# Patient Record
Sex: FEMALE | Race: WHITE | Employment: OTHER | URBAN - METROPOLITAN AREA
[De-identification: names, ages, dates, MRNs, and addresses within clinical notes are randomized per-mention and may not be internally consistent; named-entity substitution may affect disease eponyms.]

---

## 2017-01-23 ENCOUNTER — OFFICE VISIT (OUTPATIENT)
Dept: FAMILY MEDICINE CLINIC | Age: 62
End: 2017-01-23

## 2017-01-23 VITALS
OXYGEN SATURATION: 98 % | RESPIRATION RATE: 20 BRPM | DIASTOLIC BLOOD PRESSURE: 76 MMHG | BODY MASS INDEX: 31.25 KG/M2 | WEIGHT: 169.8 LBS | HEIGHT: 62 IN | HEART RATE: 76 BPM | SYSTOLIC BLOOD PRESSURE: 122 MMHG

## 2017-01-23 DIAGNOSIS — S29.019A THORACIC MYOFASCIAL STRAIN, INITIAL ENCOUNTER: Primary | ICD-10-CM

## 2017-01-23 DIAGNOSIS — M25.532 LEFT WRIST PAIN: ICD-10-CM

## 2017-01-23 PROCEDURE — 99213 OFFICE O/P EST LOW 20 MIN: CPT | Performed by: NURSE PRACTITIONER

## 2017-01-23 RX ORDER — BACLOFEN 20 MG/1
20 TABLET ORAL EVERY 8 HOURS PRN
Qty: 30 TABLET | Refills: 0 | Status: SHIPPED | OUTPATIENT
Start: 2017-01-23 | End: 2017-10-26 | Stop reason: SDUPTHER

## 2017-01-23 RX ORDER — NAPROXEN 500 MG/1
500 TABLET ORAL 2 TIMES DAILY WITH MEALS
Qty: 30 TABLET | Refills: 0 | Status: SHIPPED | OUTPATIENT
Start: 2017-01-23 | End: 2018-10-22 | Stop reason: ALTCHOICE

## 2017-01-24 ASSESSMENT — ENCOUNTER SYMPTOMS
COUGH: 0
BACK PAIN: 1
SHORTNESS OF BREATH: 0
ABDOMINAL PAIN: 0
NAUSEA: 0

## 2017-05-09 DIAGNOSIS — G25.81 RLS (RESTLESS LEGS SYNDROME): ICD-10-CM

## 2017-05-09 RX ORDER — ROPINIROLE 3 MG/1
3 TABLET, FILM COATED ORAL NIGHTLY
Qty: 30 TABLET | Refills: 11 | Status: SHIPPED | OUTPATIENT
Start: 2017-05-09 | End: 2017-10-05 | Stop reason: SDUPTHER

## 2017-06-06 ENCOUNTER — TELEPHONE (OUTPATIENT)
Dept: CARDIOLOGY | Age: 62
End: 2017-06-06

## 2017-06-06 ENCOUNTER — OFFICE VISIT (OUTPATIENT)
Dept: CARDIOLOGY | Age: 62
End: 2017-06-06

## 2017-06-06 VITALS
BODY MASS INDEX: 29.95 KG/M2 | HEART RATE: 64 BPM | SYSTOLIC BLOOD PRESSURE: 130 MMHG | DIASTOLIC BLOOD PRESSURE: 72 MMHG | HEIGHT: 63 IN | WEIGHT: 169 LBS

## 2017-06-06 DIAGNOSIS — I25.10 CORONARY ARTERY DISEASE INVOLVING NATIVE CORONARY ARTERY OF NATIVE HEART WITHOUT ANGINA PECTORIS: Primary | ICD-10-CM

## 2017-06-06 DIAGNOSIS — Z95.820 S/P ANGIOPLASTY WITH STENT: ICD-10-CM

## 2017-06-06 DIAGNOSIS — I42.9 CARDIOMYOPATHY (HCC): ICD-10-CM

## 2017-06-06 DIAGNOSIS — Z72.0 TOBACCO ABUSE: ICD-10-CM

## 2017-06-06 DIAGNOSIS — E78.00 PURE HYPERCHOLESTEROLEMIA: ICD-10-CM

## 2017-06-06 PROCEDURE — 99213 OFFICE O/P EST LOW 20 MIN: CPT | Performed by: INTERNAL MEDICINE

## 2017-06-14 DIAGNOSIS — Z95.5 H/O HEART ARTERY STENT: ICD-10-CM

## 2017-06-14 DIAGNOSIS — I25.10 CORONARY ARTERY DISEASE INVOLVING NATIVE CORONARY ARTERY OF NATIVE HEART WITHOUT ANGINA PECTORIS: ICD-10-CM

## 2017-06-14 RX ORDER — ATENOLOL 50 MG/1
75 TABLET ORAL DAILY
Qty: 135 TABLET | Refills: 3 | Status: SHIPPED | OUTPATIENT
Start: 2017-06-14 | End: 2018-09-11 | Stop reason: SDUPTHER

## 2017-10-05 ENCOUNTER — OFFICE VISIT (OUTPATIENT)
Dept: FAMILY MEDICINE CLINIC | Age: 62
End: 2017-10-05
Payer: OTHER GOVERNMENT

## 2017-10-05 VITALS
TEMPERATURE: 98.5 F | HEART RATE: 51 BPM | WEIGHT: 172.3 LBS | HEIGHT: 63 IN | OXYGEN SATURATION: 98 % | RESPIRATION RATE: 13 BRPM | DIASTOLIC BLOOD PRESSURE: 58 MMHG | SYSTOLIC BLOOD PRESSURE: 116 MMHG | BODY MASS INDEX: 30.53 KG/M2

## 2017-10-05 DIAGNOSIS — Z00.00 WELL ADULT EXAM: Primary | ICD-10-CM

## 2017-10-05 DIAGNOSIS — G57.02 PIRIFORMIS SYNDROME OF LEFT SIDE: ICD-10-CM

## 2017-10-05 DIAGNOSIS — J01.40 ACUTE NON-RECURRENT PANSINUSITIS: ICD-10-CM

## 2017-10-05 DIAGNOSIS — Z95.820 S/P ANGIOPLASTY WITH STENT: ICD-10-CM

## 2017-10-05 DIAGNOSIS — I25.10 CORONARY ARTERY DISEASE INVOLVING NATIVE CORONARY ARTERY OF NATIVE HEART WITHOUT ANGINA PECTORIS: ICD-10-CM

## 2017-10-05 DIAGNOSIS — G25.81 RLS (RESTLESS LEGS SYNDROME): ICD-10-CM

## 2017-10-05 DIAGNOSIS — F33.42 RECURRENT MAJOR DEPRESSIVE DISORDER, IN FULL REMISSION (HCC): ICD-10-CM

## 2017-10-05 PROCEDURE — 90732 PPSV23 VACC 2 YRS+ SUBQ/IM: CPT | Performed by: NURSE PRACTITIONER

## 2017-10-05 PROCEDURE — 99214 OFFICE O/P EST MOD 30 MIN: CPT | Performed by: NURSE PRACTITIONER

## 2017-10-05 PROCEDURE — 90688 IIV4 VACCINE SPLT 0.5 ML IM: CPT | Performed by: NURSE PRACTITIONER

## 2017-10-05 PROCEDURE — 90472 IMMUNIZATION ADMIN EACH ADD: CPT | Performed by: NURSE PRACTITIONER

## 2017-10-05 PROCEDURE — 90736 HZV VACCINE LIVE SUBQ: CPT | Performed by: NURSE PRACTITIONER

## 2017-10-05 PROCEDURE — 90471 IMMUNIZATION ADMIN: CPT | Performed by: NURSE PRACTITIONER

## 2017-10-05 PROCEDURE — 90715 TDAP VACCINE 7 YRS/> IM: CPT | Performed by: NURSE PRACTITIONER

## 2017-10-05 RX ORDER — ROPINIROLE 3 MG/1
3 TABLET, FILM COATED ORAL NIGHTLY
Qty: 90 TABLET | Refills: 3 | Status: CANCELLED | OUTPATIENT
Start: 2017-10-05

## 2017-10-05 RX ORDER — ROPINIROLE 3 MG/1
3 TABLET, FILM COATED ORAL NIGHTLY
Qty: 90 TABLET | Refills: 3 | Status: SHIPPED | OUTPATIENT
Start: 2017-10-05 | End: 2017-10-19 | Stop reason: SDUPTHER

## 2017-10-05 RX ORDER — LOVASTATIN 40 MG/1
80 TABLET ORAL NIGHTLY
Qty: 180 TABLET | Refills: 3 | Status: SHIPPED | OUTPATIENT
Start: 2017-10-05 | End: 2017-10-19 | Stop reason: SDUPTHER

## 2017-10-05 RX ORDER — AMOXICILLIN AND CLAVULANATE POTASSIUM 875; 125 MG/1; MG/1
1 TABLET, FILM COATED ORAL 2 TIMES DAILY WITH MEALS
Qty: 20 TABLET | Refills: 0 | Status: SHIPPED | OUTPATIENT
Start: 2017-10-05 | End: 2017-10-15

## 2017-10-05 ASSESSMENT — PATIENT HEALTH QUESTIONNAIRE - PHQ9
1. LITTLE INTEREST OR PLEASURE IN DOING THINGS: 0
SUM OF ALL RESPONSES TO PHQ9 QUESTIONS 1 & 2: 0
2. FEELING DOWN, DEPRESSED OR HOPELESS: 0
SUM OF ALL RESPONSES TO PHQ QUESTIONS 1-9: 0

## 2017-10-05 ASSESSMENT — ENCOUNTER SYMPTOMS: CHEST TIGHTNESS: 0

## 2017-10-05 NOTE — PROGRESS NOTES
09/30/2016 0940    CREATININE 0.7 09/30/2016 0940        Component Value Date/Time    CALCIUM 9.6 09/30/2016 0940    ALKPHOS 127 (H) 09/30/2016 0940    AST 26 09/30/2016 0940    ALT 30 09/30/2016 0940    BILITOT 0.4 09/30/2016 0940          Lab Results   Component Value Date    TSH 2.880 09/30/2016       Lab Results   Component Value Date    WBC 7.3 09/30/2016    HGB 13.4 09/30/2016    HCT 39.7 09/30/2016    MCV 92.3 09/30/2016     09/30/2016       Health Maintenance   Topic Date Due    Hepatitis C screen  1955    HIV screen  08/15/1969    DTaP/Tdap/Td vaccine (1 - Tdap) 08/15/1973    Pneumococcal med risk (1 of 1 - PPSV23) 08/15/1973    Colon cancer screen colonoscopy  08/15/2004    Cervical cancer screen  10/05/2018    Breast cancer screen  10/07/2018    Lipid screen  09/30/2021    Zostavax vaccine  Completed    Flu vaccine  Completed     Immunization History   Administered Date(s) Administered    Influenza, Quadv, 3 Years and older, IM 10/05/2017    Zoster 10/05/2017         Review of Systems   Respiratory: Negative for chest tightness. Neurological: Negative for dizziness and light-headedness. Psychiatric/Behavioral: Negative. Objective:   Physical Exam   Constitutional: She is oriented to person, place, and time. Vital signs are normal. She appears well-developed and well-nourished. She is active. She does not have a sickly appearance. No distress. HENT:   Right Ear: Tympanic membrane normal.   Left Ear: Tympanic membrane normal.   Nose: Mucosal edema and rhinorrhea present. Mouth/Throat: Oropharynx is clear and moist and mucous membranes are normal.   Cardiovascular: Normal rate, regular rhythm, S1 normal, S2 normal, normal heart sounds and normal pulses. Exam reveals no S3. No murmur heard. Pulmonary/Chest: Effort normal and breath sounds normal. She has no decreased breath sounds. She has no wheezes. She has no rhonchi. Abdominal: Soft.  Bowel sounds are normal.

## 2017-10-05 NOTE — PROGRESS NOTES
adverse reaction to me immediately. Patient tolerated injection well. VIS, ABN given, reviewed and signed per patient. Zostavax is mixed with a sterile diluent  NDC: 1112-6932-46, Lot: [id number], Ex Date: 11/4/19. After obtaining consent, and per orders of Lesa Gusman CNP, injection of Influenza Vaccine 0.5 ML given IM in Right deltoid by Aretha Paredes ([facility]). Patient instructed to report any adverse reaction to me immediately. Patient tolerated injection well. VIS, ABN given, reviewed and signed patient.

## 2017-10-05 NOTE — PATIENT INSTRUCTIONS
You may receive a survey about your visit with us today. The feedback from our patients helps us identify what is working well and where the service to all patients can be enhanced. Thank you! Stopping Smoking: Care Instructions  Your Care Instructions  Cigarette smokers crave the nicotine in cigarettes. Giving it up is much harder than simply changing a habit. Your body has to stop craving the nicotine. It is hard to quit, but you can do it. There are many tools that people use to quit smoking. You may find that combining tools works best for you. There are several steps to quitting. First you get ready to quit. Then you get support to help you. After that, you learn new skills and behaviors to become a nonsmoker. For many people, a necessary step is getting and using medicine. Your doctor will help you set up the plan that best meets your needs. You may want to attend a smoking cessation program to help you quit smoking. When you choose a program, look for one that has proven success. Ask your doctor for ideas. You will greatly increase your chances of success if you take medicine as well as get counseling or join a cessation program.  Some of the changes you feel when you first quit tobacco are uncomfortable. Your body will miss the nicotine at first, and you may feel short-tempered and grumpy. You may have trouble sleeping or concentrating. Medicine can help you deal with these symptoms. You may struggle with changing your smoking habits and rituals. The last step is the tricky one: Be prepared for the smoking urge to continue for a time. This is a lot to deal with, but keep at it. You will feel better. Follow-up care is a key part of your treatment and safety. Be sure to make and go to all appointments, and call your doctor if you are having problems. Its also a good idea to know your test results and keep a list of the medicines you take. How can you care for yourself at home?   · Ask your family, Exercises and a change in how you move and sit may be enough to stop the pressure on the nerve. Follow-up care is a key part of your treatment and safety. Be sure to make and go to all appointments, and call your doctor if you are having problems. It's also a good idea to know your test results and keep a list of the medicines you take. How can you care for yourself at home? · If your doctor thinks that strenuous exercise is causing your problem, stop or cut back on activities such as running. You may find swimming to be a good exercise for a while. · Stretch the piriformis muscle. Josue Salas on your back. ¨ Bend one leg at the knee and keep the other leg flat on the ground. ¨ Raise your bent knee up and then move it across your body. Hold the outside of the knee with the opposite hand. ¨ Gently pull the knee with your hand toward the opposite shoulder. ¨ Hold the stretch for at least 15 to 30 seconds. Switch legs. ¨ Do the stretch several times each day. · Massage the muscle to relieve pressure. ¨ Sit on the floor. Lean to one side so that the hip on your sore side is off the ground. Put a tennis ball under your buttock on that side. ¨ As you put weight onto the tennis ball, you may find spots that are especially sore. Move gently so that the tennis ball gently massages each of the sore spots. · Use ice or heat to help reduce pain. Put ice or a cold pack or a heating pad set on low or a warm cloth on the sore area for 10 to 20 minutes at a time. Put a thin cloth between the ice pack or heating pad and your skin. · Ask your doctor if you can take an over-the-counter pain medicine, such as acetaminophen (Tylenol), ibuprofen (Advil, Motrin), or naproxen (Aleve). Be safe with medicines. Read and follow all instructions on the label. · Have your doctor or a physical therapist watch how you move.  You may need physical therapy or special inserts in your shoes (orthotics) to help you move in a way that does not put pressure on your nerves. When should you call for help? Watch closely for changes in your health, and be sure to contact your doctor if:  · You do not feel better after several weeks of home care. · Your pain gets worse. · Your leg becomes weak or numb. Where can you learn more? Go to [email or web address] [email or web address] org and sign in to your [employer] account. Enter F905 in the [employer] box to learn more about \"Piriformis Syndrome: Care Instructions. \"     If you do not have an account, please click on the \"Sign Up Now\" link. Current as of: March 21, 2017  Content Version: 11.3  © 8806-8685 [employer]. Care instructions adapted under license by [facility] ([facility]). If you have questions about a medical condition or this instruction, always ask your healthcare professional. [place] [address] any warranty or liability for your use of this information. Piriformis Syndrome: Exercises  Your Care Instructions  Here are some examples of typical rehabilitation exercises for your condition. Start each exercise slowly. Ease off the exercise if you start to have pain. Your doctor or physical therapist will tell you when you can start these exercises and which ones will work best for you. How to do the exercises  Hip rotator stretch    1. Lie on your back with both knees bent and your feet flat on the floor. 2. Put the ankle of your affected leg on your opposite thigh near your knee. 3. Use your hand to gently push your knee (on your affected leg) away from your body until you feel a gentle stretch around your hip. 4. Hold the stretch for 15 to 30 seconds. 5. Repeat 2 to 4 times. 6. Switch legs and repeat steps 1 through 5. Piriformis stretch    1. Lie on your back with your legs straight. 2. Lift your affected leg and bend your knee. With your opposite hand, reach across your body, and then gently pull your knee toward your opposite shoulder.   3. Hold the

## 2017-10-05 NOTE — MR AVS SNAPSHOT
After Visit Summary             Jesika Oro   10/5/2017 10:45 AM   Office Visit    Description:  Female : 1955   Provider:  Rebecca Villavicencio NP   Department:  [facility]              Your Follow-Up and Future Appointments         Below is a list of your follow-up and future appointments. This may not be a complete list as you may have made appointments directly with providers that we are not aware of or your providers may have made some for you. Please call your providers to confirm appointments. It is important to keep your appointments. Please bring your current insurance card, photo ID, co-pay, and all medication bottles to your appointment. If self-pay, payment is expected at the time of service. Your To-Do List     Future Appointments Provider Department Dept Phone    2017 10:00 AM Darron Xiong MD [facility] [address] [place] [address] [phone]    Please arrive 15 minutes prior to appointment, bring photo ID and insurance card. Future Orders Complete By Expires    Comprehensive Metabolic Panel [XKX82 Custom]  10/5/2017 10/5/2018    Hemoglobin A1C [LAB90 Custom]  10/5/2017 10/5/2018    Lipid Panel Lin Nicky Custom]  10/5/2017 10/5/2018         Information from Your Visit        Department     Name Address Phone Fax    [facility] [address] [place] [phone]      You Were Seen for:         Comments    Well adult exam   [[id number]]         Vital Signs     Blood Pressure Pulse Temperature Respirations Height Weight    116/58 (Site: Left Arm, Position: Sitting, Cuff Size: Medium Adult) 51 98.5 °F (36.9 °C) (Oral) 13 5' 2.99\" (1.6 m) 172 lb 4.8 oz (78.2 kg)    Last Menstrual Period Oxygen Saturation Breastfeeding?  Body Mass Index Smoking Status       (Exact Date) 98% No 30.53 kg/m2 Current Every Day Smoker       Additional Information about your Body Mass Index (BMI) · Talk to your doctor or pharmacist about nicotine replacement therapy, which replaces the nicotine in your body. You still get nicotine but you do not use tobacco. Nicotine replacement products help you slowly reduce the amount of nicotine you need. These products come in several forms, many of them available over-the-counter:  ¨ Nicotine patches  ¨ Nicotine gum and lozenges  ¨ Nicotine inhaler  · Ask your doctor about bupropion (Wellbutrin) or varenicline (Chantix), which are prescription medicines. They do not contain nicotine. They help you by reducing withdrawal symptoms, such as stress and anxiety. · Some people find hypnosis, acupuncture, and massage helpful for ending the smoking habit. · Eat a healthy diet and get regular exercise. Having healthy habits will help your body move past its craving for nicotine. · Be prepared to keep trying. Most people are not successful the first few times they try to quit. Do not get mad at yourself if you smoke again. Make a list of things you learned and think about when you want to try again, such as next week, next month, or next year. Where can you learn more? Go to [email or web address].[email or web address] org and sign in to your [employer] account. Enter I751 in the RECOMBINETICS box to learn more about \"Stopping Smoking: Care Instructions. \"     If you do not have an account, please click on the \"Sign Up Now\" link. Current as of: March 20, 2017  Content Version: 11.3  © 0005-0436 Ghost. Care instructions adapted under license by [facility] ([facility]). If you have questions about a medical condition or this instruction, always ask your healthcare professional. [address] any warranty or liability for your use of this information. Piriformis Syndrome: Care Instructions  Your Care Instructions    The piriformis muscle is deep under your rear end (buttock).  One end of Where to Get Your Medications      These medications were sent to [facility]/pharmacy #[id number]- [place], [place] - [address] [address]     Phone:  [phone]     amoxicillin-clavulanate 875-125 MG per tablet    rOPINIRole 3 MG tablet    sertraline 50 MG tablet         These medications were sent to [address] [place], [place] [address]Santiago Holton [phone]  [address] [place] [address] [address], [address] [facility],[address] [facility]     Phone:  [phone]     lovastatin 40 MG tablet    sertraline 50 MG tablet               Your Current Medications Are              sertraline (ZOLOFT) 50 MG tablet Take 1 tablet by mouth daily    lovastatin (MEVACOR) 40 MG tablet Take 2 tablets by mouth nightly    sertraline (ZOLOFT) 50 MG tablet Take 1 tablet by mouth daily    rOPINIRole (REQUIP) 3 MG tablet Take 1 tablet by mouth nightly    amoxicillin-clavulanate (AUGMENTIN) 875-125 MG per tablet Take 1 tablet by mouth 2 times daily (with meals) for 10 days    atenolol (TENORMIN) 50 MG tablet Take 1.5 tablets by mouth daily    Multiple Vitamin (MULTI VITAMIN PO) Take by mouth    naproxen (NAPROSYN) 500 MG tablet Take 1 tablet by mouth 2 times daily (with meals)    ranitidine (ZANTAC) 150 MG tablet Take 150 mg by mouth as needed     aspirin 81 MG tablet Take 1 tablet by mouth daily      Allergies           No Known Allergies      We Ordered/Performed the following           INFLUENZA, QUADV, 3 YRS AND OLDER, IM, MDV, 0.5ML (FLUZONE QUADV)     Pneumococcal polysaccharide vaccine 23-valent >= 3yo subcutaneous/IM (PNEUMOVAX 23)     Tdap (age 10y-63y) IM (ADACEL)     Varicella-zoster vaccine subcutaneous (ZOSTAVAX)          Additional Information        Basic Information     Date Of Birth Sex Race Ethnicity Preferred Language    1955 Female White Non-/Non  English      Problem List as of 10/5/2017  Date Reviewed: 1/23/2017 Cardiomyopathy ([facility] [address] EF 40%    Coronary artery disease involving native coronary artery of native heart without angina pectoris    S/P angioplasty with stent-1995 and 1996- in [facility]    Pure hypercholesterolemia    Tobacco abuse      Your Goals as of 10/5/2017 at 11:08 AM              Today    6/6/17 1/23/17       Blood Pressure    Blood Pressure < 140/90   116/58  130/72  122/76      Preventive Care        Date Due    Hepatitis C screening is recommended for all adults regardless of risk factors born between [facility] at least once (lifetime) who have never been tested. 1955    HIV screening is recommended for all people regardless of risk factors  aged 15-65 years at least once (lifetime) who have never been HIV tested. 8/15/1969    Tetanus Combination Vaccine (1 - Tdap) 8/15/1973    Pneumococcal Vaccine - Pneumovax for adults aged 19-64 years with: chronic heart disease, chronic lung disease, diabetes mellitus, alcoholism, chronic liver disease, or cigarette smoking. (1 of 1 - PPSV23) 8/15/1973    Colonoscopy 8/15/2004    Zoster Vaccine 8/15/2014    Yearly Flu Vaccine (1) 10/5/2018 (Originally 9/1/2017)    Pap Smear 10/5/2018    Mammograms are recommended every 2 years for low/average risk patients aged 48 - 69, and every year for high risk patients per updated national guidelines. However these guidelines can be individualized by your provider. 10/7/2018    Cholesterol Screening 9/30/2021            [employer] Signup           Our records indicate that you have an active [employer] account. You can view your After Visit Summary by going to [email or web address] and logging in with your [employer] username and password. If you don't have a [employer] username and password but a parent or guardian has access to your record, the parent or guardian should login with their own [employer] username and password and access your record to view the After Visit Summary.

## 2017-10-19 ENCOUNTER — TELEPHONE (OUTPATIENT)
Dept: FAMILY MEDICINE CLINIC | Age: 62
End: 2017-10-19

## 2017-10-19 DIAGNOSIS — F33.42 RECURRENT MAJOR DEPRESSIVE DISORDER, IN FULL REMISSION (HCC): ICD-10-CM

## 2017-10-19 DIAGNOSIS — I25.10 CORONARY ARTERY DISEASE INVOLVING NATIVE CORONARY ARTERY OF NATIVE HEART WITHOUT ANGINA PECTORIS: ICD-10-CM

## 2017-10-19 DIAGNOSIS — G25.81 RLS (RESTLESS LEGS SYNDROME): ICD-10-CM

## 2017-10-19 DIAGNOSIS — J01.40 ACUTE NON-RECURRENT PANSINUSITIS: Primary | ICD-10-CM

## 2017-10-19 RX ORDER — ROPINIROLE 3 MG/1
3 TABLET, FILM COATED ORAL NIGHTLY
Qty: 90 TABLET | Refills: 3 | Status: SHIPPED | OUTPATIENT
Start: 2017-10-19 | End: 2018-10-18 | Stop reason: SDUPTHER

## 2017-10-19 RX ORDER — LOVASTATIN 40 MG/1
80 TABLET ORAL NIGHTLY
Qty: 180 TABLET | Refills: 3 | Status: SHIPPED | OUTPATIENT
Start: 2017-10-19 | End: 2018-10-18 | Stop reason: SDUPTHER

## 2017-10-19 RX ORDER — PREDNISONE 50 MG/1
50 TABLET ORAL DAILY
Qty: 5 TABLET | Refills: 0 | Status: SHIPPED | OUTPATIENT
Start: 2017-10-19 | End: 2017-10-24

## 2017-10-26 ENCOUNTER — TELEPHONE (OUTPATIENT)
Dept: FAMILY MEDICINE CLINIC | Age: 62
End: 2017-10-26

## 2017-10-26 DIAGNOSIS — S29.019A THORACIC MYOFASCIAL STRAIN, INITIAL ENCOUNTER: ICD-10-CM

## 2017-10-26 RX ORDER — BACLOFEN 20 MG/1
20 TABLET ORAL EVERY 8 HOURS PRN
Qty: 30 TABLET | Refills: 1 | Status: SHIPPED | OUTPATIENT
Start: 2017-10-26 | End: 2019-06-19

## 2017-10-26 NOTE — TELEPHONE ENCOUNTER
10/26/17 Pt is asking for a refill on her Baclofen 20 mg, not on med list. Ordered on 1/23/17 per Jose De Jesus Ronquillo.  Pt g

## 2018-01-29 DIAGNOSIS — F33.42 RECURRENT MAJOR DEPRESSIVE DISORDER, IN FULL REMISSION (HCC): ICD-10-CM

## 2018-09-11 DIAGNOSIS — Z95.5 H/O HEART ARTERY STENT: ICD-10-CM

## 2018-09-11 DIAGNOSIS — I25.10 CORONARY ARTERY DISEASE INVOLVING NATIVE CORONARY ARTERY OF NATIVE HEART WITHOUT ANGINA PECTORIS: ICD-10-CM

## 2018-09-11 RX ORDER — ATENOLOL 50 MG/1
75 TABLET ORAL DAILY
Qty: 135 TABLET | Refills: 0 | Status: SHIPPED | OUTPATIENT
Start: 2018-09-11 | End: 2018-09-13 | Stop reason: SDUPTHER

## 2018-09-11 NOTE — TELEPHONE ENCOUNTER
Angélica Oro called requesting a refill on the following medications:  Requested Prescriptions     Pending Prescriptions Disp Refills    atenolol (TENORMIN) 50 MG tablet 135 tablet 3     Sig: Take 1.5 tablets by mouth daily   also needs 30 day supply, so she doesn't run out before her mail order arrives     Pharmacy verified:  [email or web address]       Date of last visit: 01-23-17  Date of next visit (if applicable): 34-02-32

## 2018-09-13 DIAGNOSIS — I25.10 CORONARY ARTERY DISEASE INVOLVING NATIVE CORONARY ARTERY OF NATIVE HEART WITHOUT ANGINA PECTORIS: ICD-10-CM

## 2018-09-13 DIAGNOSIS — Z95.5 H/O HEART ARTERY STENT: ICD-10-CM

## 2018-09-13 RX ORDER — ATENOLOL 50 MG/1
75 TABLET ORAL DAILY
Qty: 45 TABLET | Refills: 0 | Status: SHIPPED | OUTPATIENT
Start: 2018-09-13 | End: 2018-10-08 | Stop reason: SDUPTHER

## 2018-09-13 NOTE — TELEPHONE ENCOUNTER
Pt called office asking for a 30day supply of Atenolol to be sent to her local pharmacy. She is out of medication and waiting for her mail order. Pt uses [facility]. If no call back, pt will check with her pharmacy this afternoon. Refill if appropriate.

## 2018-10-08 ENCOUNTER — OFFICE VISIT (OUTPATIENT)
Dept: FAMILY MEDICINE CLINIC | Age: 63
End: 2018-10-08
Payer: OTHER GOVERNMENT

## 2018-10-08 VITALS
DIASTOLIC BLOOD PRESSURE: 60 MMHG | SYSTOLIC BLOOD PRESSURE: 122 MMHG | BODY MASS INDEX: 29.36 KG/M2 | HEART RATE: 56 BPM | WEIGHT: 165.7 LBS | TEMPERATURE: 98.4 F | RESPIRATION RATE: 28 BRPM | HEIGHT: 63 IN | OXYGEN SATURATION: 93 %

## 2018-10-08 DIAGNOSIS — G25.81 RLS (RESTLESS LEGS SYNDROME): ICD-10-CM

## 2018-10-08 DIAGNOSIS — Z23 NEED FOR INFLUENZA VACCINATION: ICD-10-CM

## 2018-10-08 DIAGNOSIS — Z95.5 H/O HEART ARTERY STENT: ICD-10-CM

## 2018-10-08 DIAGNOSIS — Z12.39 BREAST CANCER SCREENING: ICD-10-CM

## 2018-10-08 DIAGNOSIS — R05.8 POST-VIRAL COUGH SYNDROME: ICD-10-CM

## 2018-10-08 DIAGNOSIS — Z23 NEED FOR SHINGLES VACCINE: ICD-10-CM

## 2018-10-08 DIAGNOSIS — E78.00 PURE HYPERCHOLESTEROLEMIA: ICD-10-CM

## 2018-10-08 DIAGNOSIS — I42.9 CARDIOMYOPATHY, UNSPECIFIED TYPE (HCC): ICD-10-CM

## 2018-10-08 DIAGNOSIS — I25.10 CORONARY ARTERY DISEASE INVOLVING NATIVE CORONARY ARTERY OF NATIVE HEART WITHOUT ANGINA PECTORIS: Primary | ICD-10-CM

## 2018-10-08 DIAGNOSIS — Z72.0 TOBACCO ABUSE: ICD-10-CM

## 2018-10-08 PROCEDURE — 99386 PREV VISIT NEW AGE 40-64: CPT | Performed by: NURSE PRACTITIONER

## 2018-10-08 PROCEDURE — 90472 IMMUNIZATION ADMIN EACH ADD: CPT | Performed by: NURSE PRACTITIONER

## 2018-10-08 PROCEDURE — 90688 IIV4 VACCINE SPLT 0.5 ML IM: CPT | Performed by: NURSE PRACTITIONER

## 2018-10-08 PROCEDURE — 90750 HZV VACC RECOMBINANT IM: CPT | Performed by: NURSE PRACTITIONER

## 2018-10-08 PROCEDURE — 90471 IMMUNIZATION ADMIN: CPT | Performed by: NURSE PRACTITIONER

## 2018-10-08 RX ORDER — PRAMIPEXOLE DIHYDROCHLORIDE 0.25 MG/1
0.25 TABLET ORAL DAILY
Qty: 90 TABLET | Refills: 3 | Status: SHIPPED | OUTPATIENT
Start: 2018-10-08 | End: 2019-01-02 | Stop reason: SDUPTHER

## 2018-10-08 RX ORDER — HYDROCODONE POLISTIREX AND CHLORPHENIRAMINE POLISTIREX 10; 8 MG/5ML; MG/5ML
5 SUSPENSION, EXTENDED RELEASE ORAL EVERY 12 HOURS PRN
Qty: 120 ML | Refills: 0 | Status: SHIPPED | OUTPATIENT
Start: 2018-10-08 | End: 2018-10-15

## 2018-10-08 RX ORDER — ATENOLOL 50 MG/1
75 TABLET ORAL DAILY
Qty: 45 TABLET | Refills: 0 | Status: SHIPPED | OUTPATIENT
Start: 2018-10-08 | End: 2018-10-18 | Stop reason: SDUPTHER

## 2018-10-08 ASSESSMENT — ENCOUNTER SYMPTOMS
COUGH: 1
CHEST TIGHTNESS: 0

## 2018-10-08 ASSESSMENT — PATIENT HEALTH QUESTIONNAIRE - PHQ9
SUM OF ALL RESPONSES TO PHQ QUESTIONS 1-9: 0
1. LITTLE INTEREST OR PLEASURE IN DOING THINGS: 0
2. FEELING DOWN, DEPRESSED OR HOPELESS: 0
SUM OF ALL RESPONSES TO PHQ QUESTIONS 1-9: 0
SUM OF ALL RESPONSES TO PHQ9 QUESTIONS 1 & 2: 0

## 2018-10-08 NOTE — PROGRESS NOTES
Subjective:      Patient ID: Apollo Guthrie is a 58 y.o. female. HPI  : Annual Exam    Chief Complaint   Patient presents with    Annual Exam    Medication Refill    Cough     for the past 1.5 wks       Onset of 1.5 week with cough and congestion. Cough that is non-productive. Clear phlegm. Feels better today. Congestion improved just continues with cough. Denies CP, SOB or chest tightness    OTC:  Tylenol/Cold Sinus, Mucinex DM    Patient Active Problem List   Diagnosis    Coronary artery disease involving native coronary artery of native heart without angina pectoris    S/P angioplasty with stent-1995 and 1996- in [address] Pure hypercholesterolemia    Tobacco abuse    Cardiomyopathy ([facility] [address] EF 40%       CARDIO - Dr. Christie Carson 10/22/18    COLONOSCOPY - due  MAMMO - 2016  PAP - 2016    Denies CP, SOB and chest tightness. Hx of STENT placement and CAD. Cardiomyopathy. RLS controlled at night with Requip. Complains of RLS during the day. Depression Stable on Zoloft 50 mg. Chronic. BP Readings from Last 3 Encounters:   10/08/18 122/60   10/05/17 (!) 116/58   06/06/17 130/72       BP wnl    Labs reviewed. Due for annual.     Lab Results   Component Value Date    LABA1C 5.3 09/30/2016     No results found for: EAG      ON STATIN.      No components found for: CHLPL  Lab Results   Component Value Date    TRIG 194 09/30/2016     Lab Results   Component Value Date    HDL 36 09/30/2016     Lab Results   Component Value Date    LDLCALC 102 09/30/2016     No results found for: LABVLDL      Chemistry        Component Value Date/Time     09/30/2016 0940    K 4.7 09/30/2016 0940     09/30/2016 0940    CO2 27 09/30/2016 0940    BUN 13 09/30/2016 0940    CREATININE 0.7 09/30/2016 0940        Component Value Date/Time    CALCIUM 9.6 09/30/2016 0940    ALKPHOS 127 (H) 09/30/2016 0940    AST 26 09/30/2016 0940    ALT 30 09/30/2016 0940    BILITOT 0.4 09/30/2016 0940 Lab Results   Component Value Date    TSH 2.880 09/30/2016       Lab Results   Component Value Date    WBC 7.3 09/30/2016    HGB 13.4 09/30/2016    HCT 39.7 09/30/2016    MCV 92.3 09/30/2016     09/30/2016       Health Maintenance   Topic Date Due    Hepatitis C screen  1955    HIV screen  08/15/1969    Colon cancer screen colonoscopy  08/15/2004    Cervical cancer screen  10/05/2018    Breast cancer screen  10/07/2018    Shingles Vaccine (2 of 2 - 2 Dose Series) 04/08/2019    Lipid screen  09/30/2021    DTaP/Tdap/Td vaccine (2 - Td) 10/05/2027    Flu vaccine  Completed    Pneumococcal med risk  Completed     Immunization History   Administered Date(s) Administered    Influenza, Quadv, 3 Years and older, IM (Fluzone 3 yrs and older or Afluria 5 yrs and older) 10/05/2017, 10/08/2018    Pneumococcal Polysaccharide ([id number]) 10/05/2017    Tdap (Boostrix, Adacel) 10/05/2017    Zoster Live (Zostavax) 10/05/2017    Zoster Subunit (Shingrix) 10/08/2018       Review of Systems   Respiratory: Positive for cough. Negative for chest tightness. Neurological: Negative for dizziness and light-headedness. Psychiatric/Behavioral: Negative. Objective:   Physical Exam   Constitutional: She is oriented to person, place, and time. Vital signs are normal. She appears well-developed and well-nourished. She is active. She does not have a sickly appearance. No distress. HENT:   Right Ear: Tympanic membrane normal.   Left Ear: Tympanic membrane normal.   Nose: Mucosal edema and rhinorrhea present. Mouth/Throat: Oropharynx is clear and moist and mucous membranes are normal.   Cardiovascular: Normal rate, regular rhythm, S1 normal, S2 normal, normal heart sounds and normal pulses. Exam reveals no S3. No murmur heard. Pulmonary/Chest: Effort normal and breath sounds normal. She has no decreased breath sounds. She has no wheezes. She has no rhonchi. Abdominal: Soft.  Bowel sounds are

## 2018-10-18 DIAGNOSIS — G25.81 RLS (RESTLESS LEGS SYNDROME): ICD-10-CM

## 2018-10-18 DIAGNOSIS — F33.42 RECURRENT MAJOR DEPRESSIVE DISORDER, IN FULL REMISSION (HCC): ICD-10-CM

## 2018-10-18 DIAGNOSIS — Z95.5 H/O HEART ARTERY STENT: ICD-10-CM

## 2018-10-18 DIAGNOSIS — I25.10 CORONARY ARTERY DISEASE INVOLVING NATIVE CORONARY ARTERY OF NATIVE HEART WITHOUT ANGINA PECTORIS: ICD-10-CM

## 2018-10-18 RX ORDER — LOVASTATIN 40 MG/1
80 TABLET ORAL NIGHTLY
Qty: 180 TABLET | Refills: 3 | Status: SHIPPED | OUTPATIENT
Start: 2018-10-18 | End: 2019-09-24 | Stop reason: SDUPTHER

## 2018-10-18 RX ORDER — ATENOLOL 50 MG/1
75 TABLET ORAL DAILY
Qty: 45 TABLET | Refills: 0 | Status: SHIPPED | OUTPATIENT
Start: 2018-10-18 | End: 2018-10-22

## 2018-10-18 RX ORDER — ROPINIROLE 3 MG/1
3 TABLET, FILM COATED ORAL NIGHTLY
Qty: 90 TABLET | Refills: 3 | Status: SHIPPED | OUTPATIENT
Start: 2018-10-18 | End: 2018-12-07 | Stop reason: SDUPTHER

## 2018-10-22 ENCOUNTER — OFFICE VISIT (OUTPATIENT)
Dept: CARDIOLOGY CLINIC | Age: 63
End: 2018-10-22
Payer: OTHER GOVERNMENT

## 2018-10-22 ENCOUNTER — HOSPITAL ENCOUNTER (OUTPATIENT)
Age: 63
Discharge: HOME | End: 2018-10-22
Payer: OTHER GOVERNMENT

## 2018-10-22 VITALS
HEART RATE: 58 BPM | BODY MASS INDEX: 29.62 KG/M2 | WEIGHT: 167.2 LBS | SYSTOLIC BLOOD PRESSURE: 128 MMHG | HEIGHT: 63 IN | DIASTOLIC BLOOD PRESSURE: 66 MMHG

## 2018-10-22 DIAGNOSIS — I25.5 ISCHEMIC CARDIOMYOPATHY: ICD-10-CM

## 2018-10-22 DIAGNOSIS — I25.10 CORONARY ARTERY DISEASE INVOLVING NATIVE CORONARY ARTERY OF NATIVE HEART WITHOUT ANGINA PECTORIS: ICD-10-CM

## 2018-10-22 DIAGNOSIS — Z95.820 S/P ANGIOPLASTY WITH STENT: ICD-10-CM

## 2018-10-22 DIAGNOSIS — Z72.0 TOBACCO ABUSE: ICD-10-CM

## 2018-10-22 DIAGNOSIS — E78.00 PURE HYPERCHOLESTEROLEMIA: ICD-10-CM

## 2018-10-22 DIAGNOSIS — I25.10 CORONARY ARTERY DISEASE INVOLVING NATIVE CORONARY ARTERY OF NATIVE HEART WITHOUT ANGINA PECTORIS: Primary | ICD-10-CM

## 2018-10-22 LAB
ALBUMIN SERPL-MCNC: 3.6 G/DL (ref 3.5–5.1)
ALP BLD-CCNC: 118 U/L (ref 38–126)
ALT SERPL-CCNC: 17 U/L (ref 11–66)
AST SERPL-CCNC: 18 U/L (ref 5–40)
BILIRUB SERPL-MCNC: 0.2 MG/DL (ref 0.3–1.2)
BILIRUBIN DIRECT: < 0.2 MG/DL (ref 0–0.3)
CHOLESTEROL, TOTAL: 165 MG/DL (ref 100–199)
HDLC SERPL-MCNC: 43 MG/DL
LDL CHOLESTEROL CALCULATED: 83 MG/DL
TOTAL PROTEIN: 7.4 G/DL (ref 6.1–8)
TRIGL SERPL-MCNC: 195 MG/DL (ref 0–199)

## 2018-10-22 PROCEDURE — 80061 LIPID PANEL: CPT

## 2018-10-22 PROCEDURE — 93000 ELECTROCARDIOGRAM COMPLETE: CPT | Performed by: INTERNAL MEDICINE

## 2018-10-22 PROCEDURE — 80076 HEPATIC FUNCTION PANEL: CPT

## 2018-10-22 PROCEDURE — 99214 OFFICE O/P EST MOD 30 MIN: CPT | Performed by: INTERNAL MEDICINE

## 2018-10-22 PROCEDURE — 36415 COLL VENOUS BLD VENIPUNCTURE: CPT

## 2018-10-22 RX ORDER — ATENOLOL 50 MG/1
50 TABLET ORAL DAILY
Qty: 30 TABLET | Refills: 3 | Status: SHIPPED | OUTPATIENT
Start: 2018-10-22 | End: 2019-04-22 | Stop reason: SDUPTHER

## 2018-10-22 NOTE — PROGRESS NOTES
Bradycardia   P:QRS - 1:1, Abnormal P axis, H Rate 58  Low voltage in precordial leads.    -  Nonspecific T-abnormality. ABNORMAL     Assessment     Diagnosis Orders   1. Coronary artery disease involving native coronary artery of native heart without angina pectoris  EKG 12 Lead    Hepatic Function Panel    Lipid Panel   2. Pure hypercholesterolemia  Hepatic Function Panel    Lipid Panel   3. Ischemic cardiomyopathy  Hepatic Function Panel    Lipid Panel   4. S/P angioplasty with stent-1995 and 1996- in [address] [place] [address]  Hepatic Function Panel    Lipid Panel   5. Tobacco abuse  Hepatic Function Panel    Lipid Panel       Plan   The meds and labs reviewed    Continue the current treatment and with constant vigilance to changes in symptoms and also any potential side effects. Return for care or seek medical attention immediately if symptoms got worse and/or develop new symptoms. Coronary artery disease, seems to be stable. 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 Patient need periodic lipid and liver profile. Cardiomyopathy: improving, no CHF symptoms, no change in clinical condition. Will need periodic echocardiograms depending on symptoms. Initial Echo with me showed EF 40% and the result discussed with the pat  OMT  Cont  Atenolol AND ACEI  The dose of atenolol she take does not remember  NO NEED FOR STRESS TEST AS NO ANGINA OR ANGINA EQUIVALENT  No SOB    LABS REVIEWED AND GOOD    Lipid panel and liver function test  asap      Smoking: discussed with the patient the importance of smoke cessation especially with the risk of CAD.   4 min advised    RTC in 6 months      Paula Arpita [facility]

## 2018-11-16 ENCOUNTER — HOSPITAL ENCOUNTER (OUTPATIENT)
Dept: WOMENS IMAGING | Age: 63
Discharge: HOME | End: 2018-11-16
Payer: OTHER GOVERNMENT

## 2018-11-16 DIAGNOSIS — Z12.39 BREAST CANCER SCREENING: ICD-10-CM

## 2018-11-16 PROCEDURE — 77063 BREAST TOMOSYNTHESIS BI: CPT

## 2018-12-07 DIAGNOSIS — G25.81 RLS (RESTLESS LEGS SYNDROME): ICD-10-CM

## 2018-12-07 RX ORDER — ROPINIROLE 3 MG/1
3 TABLET, FILM COATED ORAL NIGHTLY
Qty: 90 TABLET | Refills: 3 | Status: SHIPPED | OUTPATIENT
Start: 2018-12-07 | End: 2019-01-02 | Stop reason: SDUPTHER

## 2019-01-02 ENCOUNTER — TELEPHONE (OUTPATIENT)
Dept: FAMILY MEDICINE CLINIC | Age: 64
End: 2019-01-02

## 2019-01-02 DIAGNOSIS — G47.9 SLEEP DISORDER: Primary | ICD-10-CM

## 2019-01-02 DIAGNOSIS — G25.81 RLS (RESTLESS LEGS SYNDROME): ICD-10-CM

## 2019-01-02 RX ORDER — ROPINIROLE 3 MG/1
3 TABLET, FILM COATED ORAL NIGHTLY
Qty: 90 TABLET | Refills: 3 | Status: SHIPPED | OUTPATIENT
Start: 2019-01-02 | End: 2019-11-20 | Stop reason: SDUPTHER

## 2019-01-02 RX ORDER — PRAMIPEXOLE DIHYDROCHLORIDE 0.5 MG/1
0.5 TABLET ORAL DAILY
Qty: 90 TABLET | Refills: 3 | Status: SHIPPED | OUTPATIENT
Start: 2019-01-02 | End: 2019-07-22 | Stop reason: SDUPTHER

## 2019-02-04 ENCOUNTER — NURSE ONLY (OUTPATIENT)
Dept: FAMILY MEDICINE CLINIC | Age: 64
End: 2019-02-04
Payer: OTHER GOVERNMENT

## 2019-02-04 DIAGNOSIS — Z23 NEED FOR SHINGLES VACCINE: Primary | ICD-10-CM

## 2019-02-04 PROCEDURE — 90471 IMMUNIZATION ADMIN: CPT | Performed by: NURSE PRACTITIONER

## 2019-02-04 PROCEDURE — 90750 HZV VACC RECOMBINANT IM: CPT | Performed by: NURSE PRACTITIONER

## 2019-03-25 DIAGNOSIS — F33.42 RECURRENT MAJOR DEPRESSIVE DISORDER, IN FULL REMISSION (HCC): ICD-10-CM

## 2019-04-15 ENCOUNTER — OFFICE VISIT (OUTPATIENT)
Dept: FAMILY MEDICINE CLINIC | Age: 64
End: 2019-04-15
Payer: OTHER GOVERNMENT

## 2019-04-15 VITALS
SYSTOLIC BLOOD PRESSURE: 122 MMHG | BODY MASS INDEX: 28.03 KG/M2 | TEMPERATURE: 98.4 F | RESPIRATION RATE: 15 BRPM | WEIGHT: 158.2 LBS | DIASTOLIC BLOOD PRESSURE: 66 MMHG | HEIGHT: 63 IN | HEART RATE: 58 BPM | OXYGEN SATURATION: 98 %

## 2019-04-15 DIAGNOSIS — N39.3 STRESS INCONTINENCE: ICD-10-CM

## 2019-04-15 DIAGNOSIS — J01.00 ACUTE NON-RECURRENT MAXILLARY SINUSITIS: Primary | ICD-10-CM

## 2019-04-15 DIAGNOSIS — Z12.11 ENCOUNTER FOR COLORECTAL CANCER SCREENING: ICD-10-CM

## 2019-04-15 DIAGNOSIS — Z12.12 ENCOUNTER FOR COLORECTAL CANCER SCREENING: ICD-10-CM

## 2019-04-15 PROCEDURE — 99213 OFFICE O/P EST LOW 20 MIN: CPT | Performed by: NURSE PRACTITIONER

## 2019-04-15 RX ORDER — PREDNISONE 50 MG/1
50 TABLET ORAL DAILY
Qty: 5 TABLET | Refills: 0 | Status: SHIPPED | OUTPATIENT
Start: 2019-04-15 | End: 2019-04-20

## 2019-04-15 ASSESSMENT — PATIENT HEALTH QUESTIONNAIRE - PHQ9
SUM OF ALL RESPONSES TO PHQ QUESTIONS 1-9: 0
SUM OF ALL RESPONSES TO PHQ9 QUESTIONS 1 & 2: 0
2. FEELING DOWN, DEPRESSED OR HOPELESS: 0
1. LITTLE INTEREST OR PLEASURE IN DOING THINGS: 0
SUM OF ALL RESPONSES TO PHQ QUESTIONS 1-9: 0

## 2019-04-15 ASSESSMENT — ENCOUNTER SYMPTOMS
SINUS PRESSURE: 1
RHINORRHEA: 1
COUGH: 1
NAUSEA: 0
ABDOMINAL PAIN: 0
SINUS PAIN: 1
SHORTNESS OF BREATH: 0

## 2019-04-15 NOTE — PROGRESS NOTES
Visit Information    Have you changed or started any medications since your last visit including any over-the-counter medicines, vitamins, or herbal medicines? no   Are you having any side effects from any of your medications? -  no  Have you stopped taking any of your medications? Is so, why? -  no    Have you seen any other physician or provider since your last visit? No  Have you had any other diagnostic tests since your last visit? No  Have you been seen in the emergency room and/or had an admission to a hospital since we last saw you? No  Have you had your routine dental cleaning in the past 6 months? na    Have you activated your [employer] account? If not, what are your barriers?  Yes     Patient Care Team:  JANIA Mejia CNP as PCP - General (Certified Nurse Practitioner)    Medical History Review  Past Medical, Family, and Social History reviewed and does not contribute to the patient presenting condition    Health Maintenance   Topic Date Due    Hepatitis C screen  1955    HIV screen  08/15/1969    Colon cancer screen colonoscopy  08/15/2004    Cervical cancer screen  10/05/2018    Breast cancer screen  11/16/2020    Lipid screen  10/22/2023    DTaP/Tdap/Td vaccine (2 - Td) 10/05/2027    Flu vaccine  Completed    Shingles Vaccine  Completed    Pneumococcal 0-64 years Vaccine  Completed
tenderness. Eyes: Pupils are equal, round, and reactive to light. EOM are normal.   Neck: Normal range of motion. Neck supple. Cardiovascular: Normal rate and regular rhythm. No murmur heard. Pulmonary/Chest: Effort normal and breath sounds normal. She has no wheezes. Abdominal: Soft. Bowel sounds are normal. She exhibits no distension. There is no tenderness. Musculoskeletal: Normal range of motion. She exhibits no tenderness. Skin: Skin is warm and dry. No rash noted. Assessment:       Diagnosis Orders   1. Acute non-recurrent maxillary sinusitis  predniSONE (DELTASONE) 50 MG tablet   2. Stress incontinence  Lucrecia Vasquez MD, [facility], [facility]   3.  Encounter for colorectal cancer screening  [facility] Colonoscopy Referral           Plan:      Allergy vs Viral  Prednisone Burst  Fluids and rest  Refer to UROLOGY for tx options for #2  Refer to Colonoscopy CLINIC  RTO if symptoms worsen or stay the same            JANIA Sahu - CNP

## 2019-04-15 NOTE — PATIENT INSTRUCTIONS
You may receive a survey about your visit with us today. The feedback from our patients helps us identify what is working well and where the service to all patients can be enhanced. Thank you! Tobacco Cessation Programs     Telephonic behavior modification  Vinay Gold ([phone])   Counseling service for those who are ready to quit using tobacco.     Available for uninsured [place] residents, [place] recipients, pregnant women, or patients whose health plans or employers are members of the [address] [place] [address] behavior modification   http://[place]. Quitlogix. org   Online support program to help patients through each step of the quitting process. Available 24 hours a day 7 days a week. Provides up to date researched based tool, step-by-step guides, and motivational messages. Online behavior modification   www.lungusa.org/stop-smoking/how-to-quit   HelpLine: [facility] (868-9912)   Email questions to:  Ana@GEOLID. SwiftPayMD(TM) by [employer]    Website offers resources to help tobacco users figure out their reasons for quitting and then take the big step of quitting for good. Hypnosis   Location: [facility], [employer].[place], [place]   Contact: Daily Jarquin, PhD at [phone]   Hypnosis for tobacco cessation   Cost $225 for the initial session and $175 for each session afterwards. Most patients require 6-8 sessions. There is the option to submit through the patients insurance. Hypnosis and behavior modification   Location: [place] [address],  [address] MARTIN IGNACIO [employer].[place], [place]   Contact: Gregorio Díaz, PhD at [phone]  [facility] and hypnosis for nicotine addition   Cost: For uninsured patients:  Please call above phone number  Cost for insured patients depends on patients insurance plan.     Behavior modification   Location: [facility], [address] [place]ARTIN IGNACIO AM [employer]ANNA II[place], [address]: [place] [address] include four one-on-one appointments between the patient and a respiratory therapist.  The four appointments span over three weeks. The respiratory therapist schedules one of the appointments to occur 48 hours after the patients quit date.  Cost $100 total for the four sessions. Tobacco cessation products are not included in the cost and are not provided by [facility]     Patient Education        Kegel Exercises: Care Instructions  Your Care Instructions    Kegel exercises strengthen muscles around the bladder. These muscles control the flow of urine. Kegel exercises are sometime called \"pelvic floor\" exercises. They can help prevent urine leakage and keep the pelvic organs in place. A woman who just had a baby might want to try Kegel exercises. They can strengthen pelvic muscles that have been weakened by pregnancy and childbirth. A man or woman may use Kegel exercises to treat urine leakage. You do Kegel exercises by tightening the muscles you use when you urinate. You will likely need to do these exercises for several weeks to get better. Follow-up care is a key part of your treatment and safety. Be sure to make and go to all appointments, and call your doctor if you are having problems. It's also a good idea to know your test results and keep a list of the medicines you take. How can you care for yourself at home? · Do Kegel exercises. ? Find the muscles you need to strengthen. To do this, tighten the muscles that stop your urine while you are going to the bathroom. These are the same muscles you squeeze during Kegel exercises. ? Squeeze the muscles as hard as you can. Your belly and thighs should not move. ? Hold the squeeze for 3 seconds. Then relax for 3 seconds. ? Start with 3 seconds, and then add 1 second each week until you are able to squeeze for 10 seconds. ? Repeat the exercise 10 to 15 times for each session. Do three or more sessions each day. · You can check to see if you are using the right muscles.  Place a finger in your vagina and squeeze around it. You are doing them right when you feel pressure around your finger. Your doctor may also suggest that you put special weights in your vagina while you do the exercises. · Do not smoke. It can irritate the bladder. If you need help quitting, talk to your doctor about stop-smoking programs and medicines. These can increase your chances of quitting for good. Where can you learn more? Go to https://[employer] Grouppeikereb.MOLI. org and sign in to your Songfor account. Enter S647 in the [employer] box to learn more about \"Kegel Exercises: Care Instructions. \"     If you do not have an account, please click on the \"Sign Up Now\" link. Current as of: March 20, 2018  Content Version: 11.9  © 1725-1782 [employer]. Care instructions adapted under license by [facility] ([facility]). If you have questions about a medical condition or this instruction, always ask your healthcare professional. [place] [address] any warranty or liability for your use of this information.

## 2019-04-22 ENCOUNTER — OFFICE VISIT (OUTPATIENT)
Dept: CARDIOLOGY CLINIC | Age: 64
End: 2019-04-22
Payer: OTHER GOVERNMENT

## 2019-04-22 VITALS
HEIGHT: 63 IN | HEART RATE: 55 BPM | BODY MASS INDEX: 29.16 KG/M2 | DIASTOLIC BLOOD PRESSURE: 67 MMHG | SYSTOLIC BLOOD PRESSURE: 127 MMHG | WEIGHT: 164.6 LBS

## 2019-04-22 DIAGNOSIS — Z72.0 TOBACCO ABUSE: ICD-10-CM

## 2019-04-22 DIAGNOSIS — E78.00 PURE HYPERCHOLESTEROLEMIA: ICD-10-CM

## 2019-04-22 DIAGNOSIS — I25.10 CORONARY ARTERY DISEASE INVOLVING NATIVE CORONARY ARTERY OF NATIVE HEART WITHOUT ANGINA PECTORIS: Primary | ICD-10-CM

## 2019-04-22 DIAGNOSIS — I25.5 ISCHEMIC CARDIOMYOPATHY: ICD-10-CM

## 2019-04-22 DIAGNOSIS — Z95.820 S/P ANGIOPLASTY WITH STENT: ICD-10-CM

## 2019-04-22 PROCEDURE — 99214 OFFICE O/P EST MOD 30 MIN: CPT | Performed by: INTERNAL MEDICINE

## 2019-04-22 RX ORDER — ATENOLOL 50 MG/1
50 TABLET ORAL DAILY
Qty: 90 TABLET | Refills: 3 | Status: SHIPPED | OUTPATIENT
Start: 2019-04-22 | End: 2019-11-20 | Stop reason: SDUPTHER

## 2019-04-22 NOTE — TELEPHONE ENCOUNTER
Alexandre Oro called requesting a refill on the following medications:  Requested Prescriptions     Pending Prescriptions Disp Refills    atenolol (TENORMIN) 50 MG tablet 30 tablet 3     Sig: Take 1 tablet by mouth daily     Pharmacy verified: [address] mail order    Date of last visit: 4/15/19  Date of next visit (if applicable): Visit date not found

## 2019-04-22 NOTE — PROGRESS NOTES
Chief Complaint   Patient presents with    6 Month Follow-Up    Coronary Artery Disease     Originally Pt sent for establishing Cardiaology  Had CAD and had stent 1995 and 1996  After that she has been doing well    Pt here for a 6 month f/u    Denies chest pain, sob, dizziness, edema, and palpitations    Pt states she had stents put [facility] heart [facility]       Patient Active Problem List   Diagnosis    Coronary artery disease involving native coronary artery of native heart without angina pectoris    S/P angioplasty with stent-1995 and 1996- in [address] Pure hypercholesterolemia    Tobacco abuse    Cardiomyopathy ([address] EF 40%       Past Surgical History:   Procedure Laterality Date    CORONARY ANGIOPLASTY WITH STENT PLACEMENT      right main done in [address]      partial    LUNG BIOPSY      TRANSTHORACIC ECHOCARDIOGRAM  10/31/2016       No Known Allergies     Family History   Problem Relation Age of Onset    Cancer Mother         lung cancer    Other Father         pneumonia    Other Sister         alcohol poisoning   Hugo Bravo Cancer Sister         female organs        Social History     Socioeconomic History    Marital status:      Spouse name: Not on file    Number of children: Not on file    Years of education: Not on file    Highest education level: Not on file   Occupational History    Not on file   Social Needs    Financial resource strain: Not on file    Food insecurity:     Worry: Not on file     Inability: Not on file    Transportation needs:     Medical: Not on file     Non-medical: Not on file   Tobacco Use    Smoking status: Current Every Day Smoker     Packs/day: 0.50     Years: 52.00     Pack years: 26.00     Types: Cigarettes     Start date: 12/6/1966    Smokeless tobacco: Never Used   Substance and Sexual Activity    Alcohol use:  Yes     Alcohol/week: 3.0 - 3.6 oz     Types: 5 - 6 Cans of beer per week    Drug use: No    Sexual activity: Not on file   Lifestyle    Physical activity:     Days per week: Not on file     Minutes per session: Not on file    Stress: Not on file   Relationships    Social connections:     Talks on phone: Not on file     Gets together: Not on file     Attends [employer] service: Not on file     Active member of club or organization: Not on file     Attends meetings of clubs or organizations: Not on file     Relationship status: Not on file    Intimate partner violence:     Fear of current or ex partner: Not on file     Emotionally abused: Not on file     Physically abused: Not on file     Forced sexual activity: Not on file   Other Topics Concern    Not on file   Social History Narrative    Not on file       Current Outpatient Medications   Medication Sig Dispense Refill    atenolol (TENORMIN) 50 MG tablet Take 1 tablet by mouth daily 90 tablet 3    sertraline (ZOLOFT) 50 MG tablet TAKE 1 TABLET BY MOUTH DAILY 90 tablet 2    rOPINIRole (REQUIP) 3 MG tablet Take 1 tablet by mouth nightly 90 tablet 3    pramipexole (MIRAPEX) 0.5 MG tablet Take 1 tablet by mouth daily 90 tablet 3    lovastatin (MEVACOR) 40 MG tablet Take 2 tablets by mouth nightly 180 tablet 3    baclofen (LIORESAL) 20 MG tablet Take 1 tablet by mouth every 8 hours as needed (muscle pain) 30 tablet 1    Multiple Vitamin (MULTI VITAMIN PO) Take by mouth      ranitidine (ZANTAC) 150 MG tablet Take 150 mg by mouth as needed       aspirin 81 MG tablet Take 1 tablet by mouth daily 90 tablet 3     No current facility-administered medications for this visit. Review of Systems -     General ROS: negative  Psychological ROS: negative  Hematological and Lymphatic ROS: No history of blood clots or bleeding disorder.    Respiratory ROS: no cough, shortness of breath, or wheezing  Cardiovascular ROS: no chest pain or dyspnea on exertion  Gastrointestinal ROS: negative  Genito-Urinary ROS: no dysuria, trouble voiding, or PT/INR:  No components found for: Chaitanya Vera  HgBA1c:    Lab Results   Component Value Date    LABA1C 5.3 09/30/2016     FLP:    Lab Results   Component Value Date    TRIG 195 10/22/2018    HDL 43 10/22/2018    LDLCALC 83 10/22/2018     TSH:    Lab Results   Component Value Date    TSH 2.880 09/30/2016       EKG 9/30/16  Sinus  Bradycardia   Low voltage -possible pulmonary disease. ABNORMAL       Conclusions      Summary   Left ventricle size is normal.   Normal left ventricular wall thickness.   Moderately hypokinetic motion of the anteroseptal, septal wall noted in   the left ventricle.   Systolic function was moderately reduced.   Ejection fraction is visually estimated in the range of 40% to 45%.   The left atrium is Mildly dilated.      Signature      ----------------------------------------------------------------   Electronically signed by David Bergman MD (Interpreting   physician) on 11/01/2016 at 08:26 PM      EKG 10/22/18  Atrial  Bradycardia   P:QRS - 1:1, Abnormal P axis, H Rate 58  Low voltage in precordial leads.    -  Nonspecific T-abnormality. ABNORMAL     Assessment     Diagnosis Orders   1. Coronary artery disease involving native coronary artery of native heart without angina pectoris     2. Ischemic cardiomyopathy     3. Pure hypercholesterolemia     4. S/P angioplasty with stent-1995 and 1996- in [address] [place] [address]     5. Tobacco abuse         Plan   The current  meds and labs reviewed    Continue the current treatment and with constant vigilance to changes in symptoms and also any potential side effects. Return for care or seek medical attention immediately if symptoms got worse and/or develop new symptoms. Coronary artery disease, seems to be stable. 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  Patient need periodic lipid and liver profile. Cardiomyopathy: improving, no CHF symptoms, no change in clinical condition. Will need periodic echocardiograms depending on symptoms. Initial Echo with me showed EF 40% and the result discussed with the pat  OMT  Cont  Atenolol AND ACEI  The dose of atenolol she take does not remember  NO NEED FOR STRESS TEST AS NO ANGINA OR ANGINA EQUIVALENT  No SOB  Echo F/u of lowq eF    LABS REVIEWED AND GOOD    Lipid panel and liver function test  asap      Smoking: discussed with the patient the importance of smoke cessation especially with the risk of CAD. 4 min advised    Discussed use, benefit, and side effects of prescribed medications. All patient questions answered. Pt voiced understanding. Instructed to continue current medications, diet and exercise. Continue risk factor modification and medical management. Patient agreed with treatment plan. Follow up as directed.       RTC in 6 months      Alva Floyd [facility]

## 2019-04-26 ENCOUNTER — OFFICE VISIT (OUTPATIENT)
Dept: UROLOGY | Age: 64
End: 2019-04-26
Payer: OTHER GOVERNMENT

## 2019-04-26 VITALS
BODY MASS INDEX: 28.44 KG/M2 | DIASTOLIC BLOOD PRESSURE: 66 MMHG | HEIGHT: 63 IN | WEIGHT: 160.5 LBS | SYSTOLIC BLOOD PRESSURE: 118 MMHG

## 2019-04-26 DIAGNOSIS — N39.46 MIXED INCONTINENCE URGE AND STRESS: ICD-10-CM

## 2019-04-26 DIAGNOSIS — N39.3 URINARY, INCONTINENCE, STRESS FEMALE: Primary | ICD-10-CM

## 2019-04-26 LAB
BACTERIA: NORMAL
BILIRUBIN URINE: NEGATIVE
BILIRUBIN URINE: NEGATIVE
BLOOD URINE, POC: NORMAL
BLOOD, URINE: NEGATIVE
CASTS: NORMAL /LPF
CASTS: NORMAL /LPF
CHARACTER, URINE: CLEAR
CHARACTER, URINE: CLEAR
COLOR, URINE: YELLOW
COLOR: YELLOW
CRYSTALS: NORMAL
EPITHELIAL CELLS, UA: NORMAL /HPF
GLUCOSE URINE: NEGATIVE MG/DL
GLUCOSE, URINE: NEGATIVE MG/DL
KETONES, URINE: NEGATIVE
KETONES, URINE: NEGATIVE
LEUKOCYTE CLUMPS, URINE: NEGATIVE
LEUKOCYTE ESTERASE, URINE: NEGATIVE
MISCELLANEOUS LAB TEST RESULT: NORMAL
NITRITE, URINE: NEGATIVE
NITRITE, URINE: NEGATIVE
PH UA: 5 (ref 5–9)
PH, URINE: 5 (ref 5–9)
PROTEIN UA: NEGATIVE MG/DL
PROTEIN, URINE: NEGATIVE MG/DL
RBC URINE: NORMAL /HPF
RENAL EPITHELIAL, UA: NORMAL
SPECIFIC GRAVITY UA: 1.02 (ref 1–1.03)
SPECIFIC GRAVITY, URINE: 1.02 (ref 1–1.03)
UROBILINOGEN, URINE: 0.2 EU/DL (ref 0–1)
UROBILINOGEN, URINE: 0.2 EU/DL (ref 0–1)
WBC UA: NORMAL /HPF
YEAST: NORMAL

## 2019-04-26 PROCEDURE — 81003 URINALYSIS AUTO W/O SCOPE: CPT | Performed by: NURSE PRACTITIONER

## 2019-04-26 PROCEDURE — 99999 URINALYSIS WITH MICROSCOPIC: CPT | Performed by: NURSE PRACTITIONER

## 2019-04-26 PROCEDURE — 99203 OFFICE O/P NEW LOW 30 MIN: CPT | Performed by: NURSE PRACTITIONER

## 2019-04-26 PROCEDURE — 51798 US URINE CAPACITY MEASURE: CPT | Performed by: NURSE PRACTITIONER

## 2019-04-26 RX ORDER — OXYBUTYNIN CHLORIDE 10 MG/1
10 TABLET, EXTENDED RELEASE ORAL DAILY
Qty: 30 TABLET | Refills: 3 | Status: SHIPPED | OUTPATIENT
Start: 2019-04-26 | End: 2019-07-22

## 2019-04-26 ASSESSMENT — ENCOUNTER SYMPTOMS
BACK PAIN: 0
EYES NEGATIVE: 1
VOMITING: 0
ABDOMINAL PAIN: 0
ALLERGIC/IMMUNOLOGIC NEGATIVE: 1
NAUSEA: 0
RESPIRATORY NEGATIVE: 1

## 2019-04-26 NOTE — PATIENT INSTRUCTIONS
Patient Education        Stopping Smoking: Care Instructions  Your Care Instructions  Cigarette smokers crave the nicotine in cigarettes. Giving it up is much harder than simply changing a habit. Your body has to stop craving the nicotine. It is hard to quit, but you can do it. There are many tools that people use to quit smoking. You may find that combining tools works best for you. There are several steps to quitting. First you get ready to quit. Then you get support to help you. After that, you learn new skills and behaviors to become a nonsmoker. For many people, a necessary step is getting and using medicine. Your doctor will help you set up the plan that best meets your needs. You may want to attend a smoking cessation program to help you quit smoking. When you choose a program, look for one that has proven success. Ask your doctor for ideas. You will greatly increase your chances of success if you take medicine as well as get counseling or join a cessation program.  Some of the changes you feel when you first quit tobacco are uncomfortable. Your body will miss the nicotine at first, and you may feel short-tempered and grumpy. You may have trouble sleeping or concentrating. Medicine can help you deal with these symptoms. You may struggle with changing your smoking habits and rituals. The last step is the tricky one: Be prepared for the smoking urge to continue for a time. This is a lot to deal with, but keep at it. You will feel better. Follow-up care is a key part of your treatment and safety. Be sure to make and go to all appointments, and call your doctor if you are having problems. It's also a good idea to know your test results and keep a list of the medicines you take. How can you care for yourself at home? · Ask your family, friends, and coworkers for support. You have a better chance of quitting if you have help and support.   · Join a support group, such as Nicotine Anonymous, for people who are nicotine. · Be prepared to keep trying. Most people are not successful the first few times they try to quit. Do not get mad at yourself if you smoke again. Make a list of things you learned and think about when you want to try again, such as next week, next month, or next year. Where can you learn more? Go to https://chpepiceweb.Plutora. org and sign in to your Likva account. Enter O076 in the ReefEdge box to learn more about \"Stopping Smoking: Care Instructions. \"     If you do not have an account, please click on the \"Sign Up Now\" link. Current as of: September 26, 2018  Content Version: 11.9  © [id number] [employer]. Care instructions adapted under license by [facility] ([facility]). If you have questions about a medical condition or this instruction, always ask your healthcare professional. [address] any warranty or liability for your use of this information. Patient Education        Bladder Training: Care Instructions  Your Care Instructions    Bladder training is used to treat urge incontinence and stress incontinence. Urge incontinence means that the need to urinate comes on so fast that you can't get to a toilet in time. Stress incontinence means that you leak urine because of pressure on your bladder. For example, it may happen when you laugh, cough, or lift something heavy. Bladder training can increase how long you can wait before you have to urinate. It can also help your bladder hold more urine. And it can give you better control over the urge to urinate. It is important to remember that bladder training takes a few weeks to a few months to make a difference. You may not see results right away, but don't give up. Follow-up care is a key part of your treatment and safety. Be sure to make and go to all appointments, and call your doctor if you are having problems.  It's also a good idea to know your test results and keep a list of the more about \"Bladder Training: Care Instructions. \"     If you do not have an account, please click on the \"Sign Up Now\" link. Current as of: March 20, 2018  Content Version: 11.9  © 2006-2018 [employer]. Care instructions adapted under license by [employer] ([facility]). If you have questions about a medical condition or this instruction, always ask your healthcare professional. [address] any warranty or liability for your use of this information. Patient Education         Pelvic Exercises for Urinary Incontinence (02:24)  Your health professional recommends that you watch this short online health video. Learn how to do exercises that can help prevent urine leakage. How to watch the video    Scan the QR code   OR Visit the website    [email or web address]   Current as of: March 20, 2018  Content Version: 11.9  © 2006-2018 [employer]. Care instructions adapted under license by [employer] ([facility]). If you have questions about a medical condition or this instruction, always ask your healthcare professional. [employer] any warranty or liability for your use of this information. Patient Education        oxybutynin (oral)  Pronunciation:  OX i BUE ti fabi  Brand:  Ditropan XL  What is the most important information I should know about oxybutynin? You should not use oxybutynin if you have untreated or uncontrolled narrow-angle glaucoma, a blockage in your digestive tract (stomach or intestines), or if you are unable to urinate. What is oxybutynin? Oxybutynin reduces muscle spasms of the bladder and urinary tract. Oxybutynin is used to treat symptoms of overactive bladder, such as frequent or urgent urination, incontinence (urine leakage), and increased night-time urination. Oxybutynin may also be used for purposes not listed in this medication guide. What should I discuss with my healthcare provider before using oxybutynin?   You should not use oxybutynin if you are allergic to it, or if you have:  · untreated or uncontrolled narrow-angle glaucoma;  · a blockage in your digestive tract (stomach or intestines); or  · if you are unable to urinate. To make sure oxybutynin is safe for you, tell your doctor if you have:  · glaucoma;  · liver disease;  · kidney disease;  · an enlarged prostate;  · ulcerative colitis;  · Parkinson's disease;  · a nerve disorder that affects your heart rate, blood pressure, or digestion;  · a muscle disorder such as myasthenia gravis; or  · a stomach disorder such as gastroesophageal reflux disease (GERD) or slow digestion. This medicine is not expected to harm an unborn baby. Tell your doctor if you are pregnant or plan to become pregnant. It is not known whether oxybutynin passes into breast milk or if it could harm a nursing baby. Tell your doctor if you are breast-feeding a baby. How should I take oxybutynin? Follow all directions on your prescription label. Do not take this medicine in larger or smaller amounts or for longer than recommended. Take oxybutynin with a full glass of water. Try to take the medicine at the same time each day. Oxybutynin may be taken with or without food. Do not crush, chew, or break an extended-release tablet. Swallow it whole. Measure liquid medicine with the dosing syringe provided, or with a special dose-measuring spoon or medicine cup. If you do not have a dose-measuring device, ask your pharmacist for one. Store at room temperature away from moisture and heat. What happens if I miss a dose? Take the missed dose as soon as you remember. Skip the missed dose if it is almost time for your next scheduled dose. Do not take extra medicine to make up the missed dose. What happens if I overdose? Seek emergency medical attention or call the Poison Help line at 1-590.608.6661. What should I avoid while using oxybutynin?   This medication may cause blurred vision and may impair your thinking or reactions. Be careful if you drive or do anything that requires you to be alert and able to see clearly. Avoid becoming overheated or dehydrated during exercise and in hot weather. Oxybutynin can decrease perspiration and you may be more prone to heat stroke. What are the possible side effects of oxybutynin? Get emergency medical help if you have any of these signs of an allergic reaction: hives; difficult breathing; swelling of your face, lips, tongue, or throat. Stop using oxybutynin and call your doctor at once if you have:  · feeling very thirsty or hot, being unable to urinate, heavy sweating, or hot and dry skin;  · severe stomach pain or constipation;  · blurred vision, tunnel vision, eye pain, or seeing halos around lights;  · pain or burning when you urinate; or  · little or no urinating. Common side effects may include:  · dry mouth;  · dry eyes, blurred vision;  · mild constipation; or  · dizziness, drowsiness. This is not a complete list of side effects and others may occur. Call your doctor for medical advice about side effects. You may report side effects to FDA at 5-366-FDA-1711. What other drugs will affect oxybutynin? Taking this medicine with other drugs that make you sleepy can worsen this effect. Ask your doctor before taking oxybutynin with a sleeping pill, narcotic pain medicine, muscle relaxer, or medicine for anxiety, depression, or seizures. Tell your doctor about all your current medicines and any you start or stop using, especially:  · other bladder or urinary medicines such as darifenacin, fesoterodine, tolterodine, solifenacin;  · bronchodilators such as ipratropium or tiotropium;  · cold or allergy medicine that contains an antihistamine;  · medication for Parkinson's disease; or  · medication to treat excess stomach acid, stomach ulcer, motion sickness, or irritable bowel syndrome. This list is not complete.  Other drugs may interact with oxybutynin, including prescription and over-the-counter medicines, vitamins, and herbal products. Not all possible interactions are listed in this medication guide. Where can I get more information? Your pharmacist can provide more information about oxybutynin. Remember, keep this and all other medicines out of the reach of children, never share your medicines with others, and use this medication only for the indication prescribed. Every effort has been made to ensure that the information provided by [address] is accurate, up-to-date, and complete, but no guarantee is made to that effect. Drug information contained herein may be time sensitive. [facility] information has been compiled for use by healthcare practitioners and consumers in the [facility] and therefore [facility] does not warrant that uses outside of the [facility] are appropriate, unless specifically indicated otherwise. [facility]'s drug information does not endorse drugs, diagnose patients or recommend therapy. [facility]'s drug information is an informational resource designed to assist licensed healthcare practitioners in caring for their patients and/or to serve consumers viewing this service as a supplement to, and not a substitute for, the expertise, skill, knowledge and judgment of healthcare practitioners. The absence of a warning for a given drug or drug combination in no way should be construed to indicate that the drug or drug combination is safe, effective or appropriate for any given patient. [facility] does not assume any responsibility for any aspect of healthcare administered with the aid of information [facility] provides. The information contained herein is not intended to cover all possible uses, directions, precautions, warnings, drug interactions, allergic reactions, or adverse effects.  If you have questions about the drugs you are taking, check with your doctor, nurse or pharmacist.  Copyright 3348-3223 [address]: 6.05. Revision date: 4/1/2015. Care instructions adapted under license by [facility] ([facility]). If you have questions about a medical condition or this instruction, always ask your healthcare professional. [address] any warranty or liability for your use of this information.

## 2019-04-26 NOTE — PROGRESS NOTES
No current facility-administered medications for this visit. Past Medical History  Heladio Espinosa  has a past medical history of Coronary artery disease involving native coronary artery of native heart without angina pectoris and Pure hypercholesterolemia. Past Surgical History  The patient  has a past surgical history that includes Coronary angioplasty with stent; Lung biopsy; Hysterectomy; and transthoracic echocardiogram (10/31/2016). Family History  This patient's family history includes Cancer in her mother and sister; Other in her father and sister. Social History  Heladio Espinosa  reports that she has been smoking cigarettes. She started smoking about 52 years ago. She has a 26.00 pack-year smoking history. She has never used smokeless tobacco. She reports that she drinks about 3.0 - 3.6 oz of alcohol per week. She reports that she does not use drugs. Subjective:      Review of Systems   Constitutional: Negative for activity change, appetite change, chills, diaphoresis, fatigue, fever and unexpected weight change. HENT: Negative. Eyes: Negative. Respiratory: Negative. Cardiovascular: Negative. Gastrointestinal: Negative for abdominal pain, nausea and vomiting. Endocrine: Negative. Genitourinary: Positive for urgency. Negative for difficulty urinating, dysuria, flank pain, frequency and hematuria. Musculoskeletal: Negative for back pain and myalgias. Skin: Negative. Allergic/Immunologic: Negative. Neurological: Negative. Hematological: Negative. Psychiatric/Behavioral: Negative. Objective:   /66 (Site: Left Upper Arm, Position: Sitting, Cuff Size: Medium Adult)   Ht 5' 3\" (1.6 m)   Wt 160 lb 8 oz (72.8 kg)   LMP  (Exact Date)   BMI 28.43 kg/m²     Physical Exam   Constitutional: She is oriented to person, place, and time. She appears well-developed and well-nourished. No distress. HENT:   Head: Normocephalic and atraumatic.    Eyes: Right eye exhibits no discharge. Left eye exhibits no discharge. Neck: No JVD present. Cardiovascular: Normal rate, regular rhythm, normal heart sounds and intact distal pulses. No murmur heard. Pulmonary/Chest: Effort normal and breath sounds normal. No respiratory distress. Abdominal: Soft. Bowel sounds are normal. She exhibits no distension. There is no tenderness. Genitourinary:   Genitourinary Comments: Urethra normal in appearance. No pelvic organ prolapse. Small amount of urine leakage with valsalva. Musculoskeletal: Normal range of motion. She exhibits no edema. Lymphadenopathy:     She has no cervical adenopathy. Neurological: She is alert and oriented to person, place, and time. No cranial nerve deficit. Skin: Skin is warm and dry. Capillary refill takes less than 2 seconds. She is not diaphoretic. Psychiatric: She has a normal mood and affect. Her behavior is normal. Thought content normal.       POC  Results for POC orders placed in visit on 04/26/19   POCT Urinalysis No Micro (Auto)   Result Value Ref Range    Glucose, Ur Negative NEGATIVE mg/dl    Bilirubin Urine Negative     Ketones, Urine Negative NEGATIVE    Specific Gravity, Urine 1.025 1.002 - 1.03    Blood, UA POC Trace-intact NEGATIVE    pH, Urine 5.00 5.0 - 9.0    Protein, Urine Negative NEGATIVE mg/dl    Urobilinogen, Urine 0.20 0.0 - 1.0 eu/dl    Nitrite, Urine Negative NEGATIVE    Leukocyte Clumps, Urine Negative NEGATIVE    Color, Urine Yellow YELLOW-STR    Character, Urine Clear [email or web address]         Assessment:   Mixed urinary incontinence  Trace blood on urine dip  Tobacco use    Plan:     Pt with symptoms of mixed urinary incontinence. We will trial oxybutynin and pelvic floor therapy. Discussed side effect profile of oxybutynin. F/u in 2 months with [facility].

## 2019-04-29 ENCOUNTER — HOSPITAL ENCOUNTER (OUTPATIENT)
Dept: NON INVASIVE DIAGNOSTICS | Age: 64
Discharge: HOME | End: 2019-04-29
Payer: OTHER GOVERNMENT

## 2019-04-29 DIAGNOSIS — Z95.820 S/P ANGIOPLASTY WITH STENT: ICD-10-CM

## 2019-04-29 DIAGNOSIS — Z72.0 TOBACCO ABUSE: ICD-10-CM

## 2019-04-29 DIAGNOSIS — I25.10 CORONARY ARTERY DISEASE INVOLVING NATIVE CORONARY ARTERY OF NATIVE HEART WITHOUT ANGINA PECTORIS: ICD-10-CM

## 2019-04-29 DIAGNOSIS — E78.00 PURE HYPERCHOLESTEROLEMIA: ICD-10-CM

## 2019-04-29 DIAGNOSIS — I25.5 ISCHEMIC CARDIOMYOPATHY: ICD-10-CM

## 2019-04-29 LAB
LV EF: 48 %
LVEF MODALITY: NORMAL
POST VOID RESIDUAL (PVR): 0 ML

## 2019-04-29 PROCEDURE — 93306 TTE W/DOPPLER COMPLETE: CPT

## 2019-05-23 ENCOUNTER — TELEPHONE (OUTPATIENT)
Dept: UROLOGY | Age: 64
End: 2019-05-23

## 2019-05-23 NOTE — TELEPHONE ENCOUNTER
Milla Hines called from [facility] to let us know that patient has been scheduled twice with them and no showed for both. She left a message with the patient to call if she wishes to schedule with them again.

## 2019-06-19 ENCOUNTER — TELEPHONE (OUTPATIENT)
Dept: FAMILY MEDICINE CLINIC | Age: 64
End: 2019-06-19

## 2019-06-19 ENCOUNTER — OFFICE VISIT (OUTPATIENT)
Dept: FAMILY MEDICINE CLINIC | Age: 64
End: 2019-06-19
Payer: OTHER GOVERNMENT

## 2019-06-19 VITALS
RESPIRATION RATE: 16 BRPM | TEMPERATURE: 98.5 F | DIASTOLIC BLOOD PRESSURE: 54 MMHG | HEART RATE: 60 BPM | SYSTOLIC BLOOD PRESSURE: 100 MMHG | WEIGHT: 160.6 LBS | BODY MASS INDEX: 28.45 KG/M2

## 2019-06-19 DIAGNOSIS — R05.9 COUGH: ICD-10-CM

## 2019-06-19 DIAGNOSIS — J45.909 BRONCHITIS, ALLERGIC, UNSPECIFIED ASTHMA SEVERITY, UNCOMPLICATED: Primary | ICD-10-CM

## 2019-06-19 PROCEDURE — 99213 OFFICE O/P EST LOW 20 MIN: CPT | Performed by: NURSE PRACTITIONER

## 2019-06-19 PROCEDURE — 96372 THER/PROPH/DIAG INJ SC/IM: CPT | Performed by: NURSE PRACTITIONER

## 2019-06-19 RX ORDER — METHYLPREDNISOLONE ACETATE 40 MG/ML
40 INJECTION, SUSPENSION INTRA-ARTICULAR; INTRALESIONAL; INTRAMUSCULAR; SOFT TISSUE ONCE
Status: COMPLETED | OUTPATIENT
Start: 2019-06-19 | End: 2019-06-19

## 2019-06-19 RX ORDER — METHYLPREDNISOLONE ACETATE 80 MG/ML
80 INJECTION, SUSPENSION INTRA-ARTICULAR; INTRALESIONAL; INTRAMUSCULAR; SOFT TISSUE ONCE
Status: COMPLETED | OUTPATIENT
Start: 2019-06-19 | End: 2019-06-19

## 2019-06-19 RX ORDER — BENZONATATE 100 MG/1
100-200 CAPSULE ORAL 3 TIMES DAILY PRN
Qty: 30 CAPSULE | Refills: 0 | Status: SHIPPED | OUTPATIENT
Start: 2019-06-19 | End: 2019-06-26

## 2019-06-19 RX ADMIN — METHYLPREDNISOLONE ACETATE 40 MG: 40 INJECTION, SUSPENSION INTRA-ARTICULAR; INTRALESIONAL; INTRAMUSCULAR; SOFT TISSUE at 11:39

## 2019-06-19 RX ADMIN — METHYLPREDNISOLONE ACETATE 80 MG: 80 INJECTION, SUSPENSION INTRA-ARTICULAR; INTRALESIONAL; INTRAMUSCULAR; SOFT TISSUE at 11:39

## 2019-06-19 ASSESSMENT — ENCOUNTER SYMPTOMS
SHORTNESS OF BREATH: 0
CHEST TIGHTNESS: 1
WHEEZING: 0
ABDOMINAL PAIN: 0
NAUSEA: 0
COUGH: 1

## 2019-06-19 NOTE — TELEPHONE ENCOUNTER
Pt called and said that she has a sinus infection and a cough she cant seem to get rid of. She is having drainage and when she coughs a white/yellow mucus comes up. She wanted to know if something could be called in for her? Please call pt and advise.   [address] [place] is [facility]

## 2019-06-19 NOTE — PATIENT INSTRUCTIONS
You may receive a survey regarding the care you received during your visit. Your input is valuable to us. We encourage you to complete and return your survey. We hope you will choose us in the future for your healthcare needs. Tobacco Cessation Programs     Telephonic behavior modification  Pepe Jeff ([phone])   Counseling service for those who are ready to quit using tobacco.     Available for uninsured [place] residents, [place] recipients, pregnant women, or patients whose health plans or employers are members of the [address] [place] [address] behavior modification   http://[place]. Quitlogix. org   Online support program to help patients through each step of the quitting process. Available 24 hours a day 7 days a week. Provides up to date researched based tool, step-by-step guides, and motivational messages. Online behavior modification   www.lungusa.org/stop-smoking/how-to-quit   HelpLine: [facility] (254-1230)   Email questions to:  Vignesh@[employer]. [employer]    Website offers resources to help tobacco users figure out their reasons for quitting and then take the big step of quitting for good. Hypnosis   Location: [facility] [place], [place]   Contact: Juvenal Rocha, PhD at [phone]   Hypnosis for tobacco cessation   Cost $225 for the initial session and $175 for each session afterwards. Most patients require 6-8 sessions. There is the option to submit through the patients insurance. Hypnosis and behavior modification   Location: [facility] [address],  [address] [place], [place]   Contact: Soumya Larry, PhD at [phone]  [facility] and hypnosis for nicotine addition   Cost: For uninsured patients:  Please call above phone number  Cost for insured patients depends on patients insurance plan.     Behavior modification   Location: [facility] [place], [address]: [place] [address] include four one-on-one appointments between the patient and a respiratory therapist.  The four appointments span over three weeks. The respiratory therapist schedules one of the appointments to occur 48 hours after the patients quit date.  Cost $100 total for the four sessions.   Tobacco cessation products are not included in the cost and are not provided by [facility].     Stuart Rehman

## 2019-06-19 NOTE — PROGRESS NOTES
Subjective:      Patient ID: Alonzo Tong is a 58 y.o. female. HPI: Acute for congestion    Chief Complaint   Patient presents with    Cough     started 2 weeks ago     Onset of 2-3 weeks ago with chest congestion. Came on after mowing grass. Cough that is mild productive. Tightness noted. No sinus complaints. Underlying allergies. OTC: HS Cough Syrup       Vitals:    06/19/19 1119   BP: (!) 100/54   Pulse: 60   Resp: 16   Temp: 98.5 °F (36.9 °C)       Review of Systems   Constitutional: Negative for chills and fever. HENT: Negative. Negative for congestion. Respiratory: Positive for cough and chest tightness. Negative for shortness of breath and wheezing. Cardiovascular: Negative for chest pain. Gastrointestinal: Negative for abdominal pain and nausea. Skin: Negative for rash. Allergic/Immunologic: Positive for environmental allergies. Neurological: Negative for dizziness, light-headedness and headaches. Psychiatric/Behavioral: Negative. Objective:   Physical Exam   Constitutional: Vital signs are normal. No distress. HENT:   Nose: Mucosal edema present. No rhinorrhea. Mouth/Throat: No posterior oropharyngeal edema or posterior oropharyngeal erythema. Eyes: Pupils are equal, round, and reactive to light. EOM are normal.   Neck: Normal range of motion. Neck supple. Cardiovascular: Normal rate and regular rhythm. No murmur heard. Pulmonary/Chest: Effort normal. She has decreased breath sounds. She has no wheezes. Abdominal: Soft. Bowel sounds are normal. She exhibits no distension. There is no tenderness. Musculoskeletal: Normal range of motion. She exhibits no tenderness. Skin: Skin is warm and dry. No rash noted. Assessment:       Diagnosis Orders   1. Bronchitis, allergic, unspecified asthma severity, uncomplicated  methylPREDNISolone acetate (DEPO-MEDROL) injection 40 mg    methylPREDNISolone acetate (DEPO-MEDROL) injection 80 mg   2.  Cough benzonatate (TESSALON) 100 MG capsule           Plan:      DEPO 120 IM  Tessalon Pearls  Fluids and rest  RTO if symptoms worsen or stay the same          Jamie Jameson, APRN - CNP

## 2019-07-01 ENCOUNTER — TELEPHONE (OUTPATIENT)
Dept: FAMILY MEDICINE CLINIC | Age: 64
End: 2019-07-01

## 2019-07-01 DIAGNOSIS — R05.9 COUGH: Primary | ICD-10-CM

## 2019-07-01 RX ORDER — HYDROCODONE POLISTIREX AND CHLORPHENIRAMINE POLISTIREX 10; 8 MG/5ML; MG/5ML
5 SUSPENSION, EXTENDED RELEASE ORAL EVERY 12 HOURS PRN
Qty: 120 ML | Refills: 0 | Status: SHIPPED | OUTPATIENT
Start: 2019-07-01 | End: 2019-07-08

## 2019-07-01 NOTE — TELEPHONE ENCOUNTER
Pt. Is calling stating she still has the cough and has now lost her voice. She said she does not feel sick but wants to see if something can be called in for the cough. Please advise pt.  At [phone]

## 2019-07-22 ENCOUNTER — OFFICE VISIT (OUTPATIENT)
Dept: FAMILY MEDICINE CLINIC | Age: 64
End: 2019-07-22
Payer: OTHER GOVERNMENT

## 2019-07-22 ENCOUNTER — TELEPHONE (OUTPATIENT)
Dept: FAMILY MEDICINE CLINIC | Age: 64
End: 2019-07-22

## 2019-07-22 VITALS
RESPIRATION RATE: 16 BRPM | DIASTOLIC BLOOD PRESSURE: 52 MMHG | HEART RATE: 60 BPM | WEIGHT: 156.8 LBS | SYSTOLIC BLOOD PRESSURE: 110 MMHG | BODY MASS INDEX: 27.78 KG/M2

## 2019-07-22 DIAGNOSIS — G25.81 RLS (RESTLESS LEGS SYNDROME): ICD-10-CM

## 2019-07-22 DIAGNOSIS — E86.0 DEHYDRATION: Primary | ICD-10-CM

## 2019-07-22 PROCEDURE — 99213 OFFICE O/P EST LOW 20 MIN: CPT | Performed by: NURSE PRACTITIONER

## 2019-07-22 RX ORDER — PRAMIPEXOLE DIHYDROCHLORIDE 0.5 MG/1
0.5 TABLET ORAL 2 TIMES DAILY
Qty: 180 TABLET | Refills: 3 | Status: SHIPPED | OUTPATIENT
Start: 2019-07-22 | End: 2019-12-20 | Stop reason: SDUPTHER

## 2019-07-22 ASSESSMENT — ENCOUNTER SYMPTOMS
ABDOMINAL PAIN: 0
COUGH: 0
SHORTNESS OF BREATH: 0
NAUSEA: 0

## 2019-07-22 NOTE — PROGRESS NOTES
Subjective:      Patient ID: Beata Forrest is a 58 y.o. female. HPI: ED Follow Up    Chief Complaint   Patient presents with    Follow-up     in [facility] ED on 7/19/19      Was seen in [facility] ED due to lightheadedness/ dizziness and weakness. Labs, CT Head, CXR and EKG were wnl. Dx with dehydration. Given IV fluids. Returned to work on 7/21/19 and today. On he feet all day. Continues with fatigue and weakness. Denies lightheadedness or dizziness. ECHO 2019:  Conclusions      Summary   Left ventricle size is normal.   Normal left ventricular wall thickness.   Severly hypokinetic motion of the anteroseptal and septal walls noted in   the left ventricle.   Systolic function was mildly reduced.   Ejection fraction is visually estimated in the range of 45% to 50%.   The left atrium is Mildly dilated. Patient Active Problem List   Diagnosis    Coronary artery disease involving native coronary artery of native heart without angina pectoris    S/P angioplasty with stent-1995 and 1996- in [address] Pure hypercholesterolemia    Tobacco abuse    Cardiomyopathy ([facility] [address] EF 40%       RLS controlled during day but at night will start to act up. Takes Miraprex in AM.  Tolerates well. Review of Systems   Constitutional: Positive for fatigue. Negative for chills and fever. HENT: Negative. Respiratory: Negative for cough and shortness of breath. Cardiovascular: Negative for chest pain. Gastrointestinal: Negative for abdominal pain and nausea. Musculoskeletal: Positive for myalgias. Skin: Negative for rash. Neurological: Positive for weakness. Negative for dizziness, light-headedness and headaches. Psychiatric/Behavioral: Negative. Objective:   Physical Exam   Constitutional: Vital signs are normal. No distress. Eyes: Pupils are equal, round, and reactive to light. EOM are normal.   Neck: Normal range of motion. Neck supple.    Cardiovascular: Normal rate and regular rhythm. No murmur heard. Pulmonary/Chest: Effort normal and breath sounds normal. She has no wheezes. Abdominal: Soft. Bowel sounds are normal. She exhibits no distension. There is no tenderness. Musculoskeletal: Normal range of motion. She exhibits no tenderness. Skin: Skin is warm and dry. No rash noted. Assessment:       Diagnosis Orders   1. Dehydration     2.  RLS (restless legs syndrome)  pramipexole (MIRAPEX) 0.5 MG tablet           Plan:       ED Imaging and Labs reviewed  Rest and FLUIDS (electroylte replacement)  Patient denies off work  Increase Mirapex BID  RTO if symptoms worsen or stay the same          Sherin Lala, APRN - CNP

## 2019-08-14 ENCOUNTER — TELEPHONE (OUTPATIENT)
Dept: FAMILY MEDICINE CLINIC | Age: 64
End: 2019-08-14

## 2019-08-14 DIAGNOSIS — K04.7 DENTAL INFECTION: Primary | ICD-10-CM

## 2019-08-14 RX ORDER — AMOXICILLIN 875 MG/1
875 TABLET, COATED ORAL 2 TIMES DAILY
Qty: 20 TABLET | Refills: 0 | Status: SHIPPED | OUTPATIENT
Start: 2019-08-14 | End: 2019-08-24

## 2019-08-14 NOTE — TELEPHONE ENCOUNTER
08/14/2019 Patient called asking for Juanita Harman to call in a antibiotic to [facility] for tooth infection. Patient Angelina Orantes 07/22/2019. Please advise patient at [phone]. da

## 2019-09-24 ENCOUNTER — TELEPHONE (OUTPATIENT)
Dept: FAMILY MEDICINE CLINIC | Age: 64
End: 2019-09-24

## 2019-09-24 DIAGNOSIS — I25.10 CORONARY ARTERY DISEASE INVOLVING NATIVE CORONARY ARTERY OF NATIVE HEART WITHOUT ANGINA PECTORIS: ICD-10-CM

## 2019-09-24 RX ORDER — LOVASTATIN 40 MG/1
80 TABLET ORAL NIGHTLY
Qty: 60 TABLET | Refills: 0 | Status: SHIPPED | OUTPATIENT
Start: 2019-09-24 | End: 2019-10-11 | Stop reason: SDUPTHER

## 2019-10-11 DIAGNOSIS — I25.10 CORONARY ARTERY DISEASE INVOLVING NATIVE CORONARY ARTERY OF NATIVE HEART WITHOUT ANGINA PECTORIS: ICD-10-CM

## 2019-10-11 RX ORDER — LOVASTATIN 40 MG/1
80 TABLET ORAL NIGHTLY
Qty: 60 TABLET | Refills: 11 | Status: SHIPPED | OUTPATIENT
Start: 2019-10-11 | End: 2019-10-15 | Stop reason: SDUPTHER

## 2019-10-15 DIAGNOSIS — I25.10 CORONARY ARTERY DISEASE INVOLVING NATIVE CORONARY ARTERY OF NATIVE HEART WITHOUT ANGINA PECTORIS: ICD-10-CM

## 2019-10-15 RX ORDER — LOVASTATIN 40 MG/1
80 TABLET ORAL NIGHTLY
Qty: 180 TABLET | Refills: 3 | Status: SHIPPED | OUTPATIENT
Start: 2019-10-15 | End: 2019-11-20 | Stop reason: SDUPTHER

## 2019-10-22 ENCOUNTER — TELEPHONE (OUTPATIENT)
Dept: CARDIOLOGY CLINIC | Age: 64
End: 2019-10-22

## 2019-11-20 ENCOUNTER — TELEPHONE (OUTPATIENT)
Dept: FAMILY MEDICINE CLINIC | Age: 64
End: 2019-11-20

## 2019-11-20 DIAGNOSIS — G47.9 SLEEP DISORDER: ICD-10-CM

## 2019-11-20 DIAGNOSIS — I25.10 CORONARY ARTERY DISEASE INVOLVING NATIVE CORONARY ARTERY OF NATIVE HEART WITHOUT ANGINA PECTORIS: ICD-10-CM

## 2019-11-20 RX ORDER — LOVASTATIN 40 MG/1
80 TABLET ORAL NIGHTLY
Qty: 180 TABLET | Refills: 0 | Status: SHIPPED | OUTPATIENT
Start: 2019-11-20 | End: 2019-12-20 | Stop reason: SDUPTHER

## 2019-11-20 RX ORDER — ROPINIROLE 3 MG/1
3 TABLET, FILM COATED ORAL NIGHTLY
Qty: 90 TABLET | Refills: 0 | Status: SHIPPED | OUTPATIENT
Start: 2019-11-20 | End: 2019-12-20 | Stop reason: SDUPTHER

## 2019-11-20 RX ORDER — ATENOLOL 50 MG/1
50 TABLET ORAL DAILY
Qty: 90 TABLET | Refills: 0 | Status: SHIPPED | OUTPATIENT
Start: 2019-11-20 | End: 2019-12-20 | Stop reason: SDUPTHER

## 2019-12-20 DIAGNOSIS — G25.81 RLS (RESTLESS LEGS SYNDROME): ICD-10-CM

## 2019-12-20 DIAGNOSIS — G47.9 SLEEP DISORDER: ICD-10-CM

## 2019-12-20 DIAGNOSIS — F33.42 RECURRENT MAJOR DEPRESSIVE DISORDER, IN FULL REMISSION (HCC): ICD-10-CM

## 2019-12-20 DIAGNOSIS — I25.10 CORONARY ARTERY DISEASE INVOLVING NATIVE CORONARY ARTERY OF NATIVE HEART WITHOUT ANGINA PECTORIS: ICD-10-CM

## 2019-12-20 RX ORDER — PRAMIPEXOLE DIHYDROCHLORIDE 0.5 MG/1
0.5 TABLET ORAL 2 TIMES DAILY
Qty: 180 TABLET | Refills: 3 | Status: SHIPPED | OUTPATIENT
Start: 2019-12-20 | End: 2020-09-14

## 2019-12-20 RX ORDER — LOVASTATIN 40 MG/1
80 TABLET ORAL NIGHTLY
Qty: 180 TABLET | Refills: 0 | Status: SHIPPED | OUTPATIENT
Start: 2019-12-20 | End: 2020-02-04

## 2019-12-20 RX ORDER — ATENOLOL 50 MG/1
50 TABLET ORAL DAILY
Qty: 90 TABLET | Refills: 0 | Status: SHIPPED | OUTPATIENT
Start: 2019-12-20 | End: 2020-02-17

## 2019-12-20 RX ORDER — ROPINIROLE 3 MG/1
3 TABLET, FILM COATED ORAL NIGHTLY
Qty: 90 TABLET | Refills: 0 | Status: SHIPPED | OUTPATIENT
Start: 2019-12-20 | End: 2020-02-17

## 2020-01-15 ENCOUNTER — OFFICE VISIT (OUTPATIENT)
Dept: CARDIOLOGY CLINIC | Age: 65
End: 2020-01-15
Payer: MEDICARE

## 2020-01-15 ENCOUNTER — HOSPITAL ENCOUNTER (OUTPATIENT)
Age: 65
Discharge: HOME | End: 2020-01-15
Payer: MEDICARE

## 2020-01-15 VITALS
WEIGHT: 161.6 LBS | SYSTOLIC BLOOD PRESSURE: 110 MMHG | BODY MASS INDEX: 28.63 KG/M2 | HEIGHT: 63 IN | DIASTOLIC BLOOD PRESSURE: 58 MMHG

## 2020-01-15 LAB
ALBUMIN SERPL-MCNC: 3.6 G/DL (ref 3.5–5.1)
ALP BLD-CCNC: 127 U/L (ref 38–126)
ALT SERPL-CCNC: 17 U/L (ref 11–66)
AST SERPL-CCNC: 22 U/L (ref 5–40)
BILIRUB SERPL-MCNC: 0.2 MG/DL (ref 0.3–1.2)
BILIRUBIN DIRECT: < 0.2 MG/DL (ref 0–0.3)
CHOLESTEROL, TOTAL: 142 MG/DL (ref 100–199)
HDLC SERPL-MCNC: 45 MG/DL
LDL CHOLESTEROL CALCULATED: 77 MG/DL
TOTAL PROTEIN: 7.4 G/DL (ref 6.1–8)
TRIGL SERPL-MCNC: 102 MG/DL (ref 0–199)

## 2020-01-15 PROCEDURE — 99214 OFFICE O/P EST MOD 30 MIN: CPT | Performed by: INTERNAL MEDICINE

## 2020-01-15 PROCEDURE — 3017F COLORECTAL CA SCREEN DOC REV: CPT | Performed by: INTERNAL MEDICINE

## 2020-01-15 PROCEDURE — G8400 PT W/DXA NO RESULTS DOC: HCPCS | Performed by: INTERNAL MEDICINE

## 2020-01-15 PROCEDURE — G8427 DOCREV CUR MEDS BY ELIG CLIN: HCPCS | Performed by: INTERNAL MEDICINE

## 2020-01-15 PROCEDURE — 80061 LIPID PANEL: CPT

## 2020-01-15 PROCEDURE — 1090F PRES/ABSN URINE INCON ASSESS: CPT | Performed by: INTERNAL MEDICINE

## 2020-01-15 PROCEDURE — 80076 HEPATIC FUNCTION PANEL: CPT

## 2020-01-15 PROCEDURE — 1123F ACP DISCUSS/DSCN MKR DOCD: CPT | Performed by: INTERNAL MEDICINE

## 2020-01-15 PROCEDURE — 4004F PT TOBACCO SCREEN RCVD TLK: CPT | Performed by: INTERNAL MEDICINE

## 2020-01-15 PROCEDURE — G8417 CALC BMI ABV UP PARAM F/U: HCPCS | Performed by: INTERNAL MEDICINE

## 2020-01-15 PROCEDURE — 93000 ELECTROCARDIOGRAM COMPLETE: CPT | Performed by: INTERNAL MEDICINE

## 2020-01-15 PROCEDURE — 4040F PNEUMOC VAC/ADMIN/RCVD: CPT | Performed by: INTERNAL MEDICINE

## 2020-01-15 PROCEDURE — G8484 FLU IMMUNIZE NO ADMIN: HCPCS | Performed by: INTERNAL MEDICINE

## 2020-01-15 PROCEDURE — 36415 COLL VENOUS BLD VENIPUNCTURE: CPT

## 2020-01-15 NOTE — PROGRESS NOTES
Chief Complaint   Patient presents with    6 Month Follow-Up    Coronary Artery Disease    Check-Up     Originally Pt sent for establishing Cardiaology  Had CAD and had stent 1995 and 1996  After that she has been doing well      Pt here for a 6 month f/u    EKG done today    Denies chest pain, sob, dizziness, edema, and palpitations    Cough with whitish sputum    No wt gain    Pt states she had stents put in heart at [facility]       Patient Active Problem List   Diagnosis    Coronary artery disease involving native coronary artery of native heart without angina pectoris    S/P angioplasty with stent-1995 and 1996- in [address] Pure hypercholesterolemia    Tobacco abuse    Cardiomyopathy ([facility] [address] EF 40%       Past Surgical History:   Procedure Laterality Date    CORONARY ANGIOPLASTY WITH STENT PLACEMENT      right main done in [address]      partial    LUNG BIOPSY      TRANSTHORACIC ECHOCARDIOGRAM  10/31/2016       No Known Allergies     Family History   Problem Relation Age of Onset    Cancer Mother         lung cancer    Other Father         pneumonia    Other Sister         alcohol poisoning   [de-identified] Cancer Sister         female organs        Social History     Socioeconomic History    Marital status:      Spouse name: Not on file    Number of children: Not on file    Years of education: Not on file    Highest education level: Not on file   Occupational History    Not on file   Social Needs    Financial resource strain: Not on file    Food insecurity:     Worry: Not on file     Inability: Not on file    Transportation needs:     Medical: Not on file     Non-medical: Not on file   Tobacco Use    Smoking status: Current Every Day Smoker     Packs/day: 0.50     Years: 52.00     Pack years: 26.00     Types: Cigarettes     Start date: 12/6/1966    Smokeless tobacco: Never Used   Substance and Sexual Activity    Alcohol use:  Yes     Alcohol/week: 5.0 - 6.0 standard drinks     Types: 5 - 6 Cans of beer per week    Drug use: No    Sexual activity: Not on file   Lifestyle    Physical activity:     Days per week: Not on file     Minutes per session: Not on file    Stress: Not on file   Relationships    Social connections:     Talks on phone: Not on file     Gets together: Not on file     Attends [employer] service: Not on file     Active member of club or organization: Not on file     Attends meetings of clubs or organizations: Not on file     Relationship status: Not on file    Intimate partner violence:     Fear of current or ex partner: Not on file     Emotionally abused: Not on file     Physically abused: Not on file     Forced sexual activity: Not on file   Other Topics Concern    Not on file   Social History Narrative    Not on file       Current Outpatient Medications   Medication Sig Dispense Refill    atenolol (TENORMIN) 50 MG tablet Take 1 tablet by mouth daily 90 tablet 0    lovastatin (MEVACOR) 40 MG tablet Take 2 tablets by mouth nightly 180 tablet 0    pramipexole (MIRAPEX) 0.5 MG tablet Take 1 tablet by mouth 2 times daily 180 tablet 3    rOPINIRole (REQUIP) 3 MG tablet Take 1 tablet by mouth nightly 90 tablet 0    sertraline (ZOLOFT) 50 MG tablet Take 1 tablet by mouth daily 90 tablet 2    Multiple Vitamin (MULTI VITAMIN PO) Take by mouth      ranitidine (ZANTAC) 150 MG tablet Take 150 mg by mouth as needed       aspirin 81 MG tablet Take 1 tablet by mouth daily 90 tablet 3     No current facility-administered medications for this visit. Review of Systems -     General ROS: negative  Psychological ROS: negative  Hematological and Lymphatic ROS: No history of blood clots or bleeding disorder.    Respiratory ROS: no cough, shortness of breath, or wheezing  Cardiovascular ROS: no chest pain or dyspnea on exertion  Gastrointestinal ROS: negative  Genito-Urinary ROS: no dysuria, trouble voiding, or hematuria  Musculoskeletal ROS: negative  Neurological ROS: no TIA or stroke symptoms  Dermatological ROS: negative      Blood pressure (!) 110/58, height 5' 3\" (1.6 m), weight 161 lb 9.6 oz (73.3 kg), not currently breastfeeding. Physical Examination:    General appearance - alert, well appearing, and in no distress  Mental status - alert, oriented to person, place, and time  Neck - supple, no significant adenopathy, no JVD, or carotid bruits  Chest - clear to auscultation, no wheezes, rales or rhonchi, symmetric air entry  Heart - normal rate, regular rhythm, normal S1, S2, no murmurs, rubs, clicks or gallops  Abdomen - soft, nontender, nondistended, no masses or organomegaly  Neurological - alert, oriented, normal speech, no focal findings or movement disorder noted  Musculoskeletal - no joint tenderness, deformity or swelling  Extremities - peripheral pulses normal, no pedal edema, no clubbing or cyanosis  Skin - normal coloration and turgor, no rashes, no suspicious skin lesions noted    Lab  No results for input(s): CKTOTAL, CKMB, CKMBINDEX, TROPONINI in the last 72 hours.   CBC:   Lab Results   Component Value Date    WBC 7.5 07/19/2019    RBC 4.47 07/19/2019    HGB 13.4 07/19/2019    HCT 40.6 07/19/2019    MCV 90.8 07/19/2019    MCH 30.1 07/19/2019    MCHC 33.1 07/19/2019    RDW 13.6 07/19/2019     07/19/2019    MPV 9.4 09/30/2016     BMP:    Lab Results   Component Value Date     07/19/2019    K 4.6 07/19/2019     07/19/2019    CO2 28 07/19/2019    BUN 18 07/19/2019    LABALBU 3.6 10/22/2018    CREATININE 0.82 07/19/2019    CALCIUM 8.70 07/19/2019    LABGLOM 85 09/30/2016    GLUCOSE 112 07/19/2019     Hepatic Function Panel:    Lab Results   Component Value Date    ALKPHOS 118 10/22/2018    ALT 17 10/22/2018    AST 18 10/22/2018    PROT 7.4 10/22/2018    BILITOT 0.2 10/22/2018    BILIDIR <0.2 10/22/2018    LABALBU 3.6 10/22/2018     Magnesium:  No results found for: MG  Warfarin PT/INR:  No components found for: ANDREEABET[place]  HgBA1c:    Lab Results   Component Value Date    LABA1C 5.3 09/30/2016     FLP:    Lab Results   Component Value Date    TRIG 195 10/22/2018    HDL 43 10/22/2018    LDLCALC 83 10/22/2018     TSH:    Lab Results   Component Value Date    TSH 2.880 09/30/2016       EKG 9/30/16  Sinus  Bradycardia   Low voltage -possible pulmonary disease. ABNORMAL       Conclusions      Summary   Left ventricle size is normal.   Normal left ventricular wall thickness.   Moderately hypokinetic motion of the anteroseptal, septal wall noted in   the left ventricle.   Systolic function was moderately reduced.   Ejection fraction is visually estimated in the range of 40% to 45%.   The left atrium is Mildly dilated.      Signature      ----------------------------------------------------------------   Electronically signed by Amaya Edmondson MD (Interpreting   physician) on 11/01/2016 at 08:26 PM      EKG 10/22/18  Atrial  Bradycardia   P:QRS - 1:1, Abnormal P axis, H Rate 58  Low voltage in precordial leads.    -  Nonspecific T-abnormality. ABNORMAL     ekg 1/15/2020  Sinus  Rhythm   Low voltage in precordial leads. ABNORMAL         Assessment     Diagnosis Orders   1. Coronary artery disease involving native coronary artery of native heart without angina pectoris  EKG 12 Lead   2. Ischemic cardiomyopathy  EKG 12 Lead   3. Pure hypercholesterolemia     4. SOB (shortness of breath)  EKG 12 Lead   5. S/P angioplasty with stent-1995 and 1996- in [address] [place] [address]     6. Tobacco abuse         Plan   The most current  meds and labs reviewed    Continue the current treatment and with constant vigilance to changes in symptoms and also any potential side effects. Return for care or seek medical attention immediately if symptoms got worse and/or develop new symptoms. Coronary artery disease, seems to be stable.  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 Patient need periodic lipid and liver profile. Cardiomyopathy: improving, no CHF symptoms, no change in clinical condition. Will need periodic echocardiograms depending on symptoms. Initial Echo with me showed EF 40% and the result discussed with the pat  OMT  Cont  Atenolol AND ACEI  The dose of atenolol she take does not remember  NO NEED FOR STRESS TEST AS NO ANGINA OR ANGINA EQUIVALENT  No SOB  Echo F/u of lowq eF- now 45 to 50%    Re-advised to get lab done-Lipid panel and liver function test  asap      Smoking: discussed with the patient the importance of smoke cessation especially with the risk of CAD. 4 min advised    Discussed use, benefit, and side effects of prescribed medications. All patient questions answered. Pt voiced understanding. Instructed to continue current medications, diet and exercise. Continue risk factor modification and medical management. Patient agreed with treatment plan. Follow up as directed.       RTC in 6 months      [facility]

## 2020-01-21 ENCOUNTER — OFFICE VISIT (OUTPATIENT)
Dept: FAMILY MEDICINE CLINIC | Age: 65
End: 2020-01-21
Payer: MEDICARE

## 2020-01-21 VITALS
HEIGHT: 63 IN | HEART RATE: 68 BPM | WEIGHT: 166.2 LBS | BODY MASS INDEX: 29.45 KG/M2 | OXYGEN SATURATION: 96 % | SYSTOLIC BLOOD PRESSURE: 128 MMHG | DIASTOLIC BLOOD PRESSURE: 76 MMHG | TEMPERATURE: 98.5 F | RESPIRATION RATE: 16 BRPM

## 2020-01-21 PROCEDURE — G8484 FLU IMMUNIZE NO ADMIN: HCPCS | Performed by: NURSE PRACTITIONER

## 2020-01-21 PROCEDURE — 3017F COLORECTAL CA SCREEN DOC REV: CPT | Performed by: NURSE PRACTITIONER

## 2020-01-21 PROCEDURE — 4004F PT TOBACCO SCREEN RCVD TLK: CPT | Performed by: NURSE PRACTITIONER

## 2020-01-21 PROCEDURE — 99213 OFFICE O/P EST LOW 20 MIN: CPT | Performed by: NURSE PRACTITIONER

## 2020-01-21 PROCEDURE — 1123F ACP DISCUSS/DSCN MKR DOCD: CPT | Performed by: NURSE PRACTITIONER

## 2020-01-21 PROCEDURE — G8427 DOCREV CUR MEDS BY ELIG CLIN: HCPCS | Performed by: NURSE PRACTITIONER

## 2020-01-21 PROCEDURE — G8417 CALC BMI ABV UP PARAM F/U: HCPCS | Performed by: NURSE PRACTITIONER

## 2020-01-21 PROCEDURE — 1090F PRES/ABSN URINE INCON ASSESS: CPT | Performed by: NURSE PRACTITIONER

## 2020-01-21 PROCEDURE — G8400 PT W/DXA NO RESULTS DOC: HCPCS | Performed by: NURSE PRACTITIONER

## 2020-01-21 PROCEDURE — 4040F PNEUMOC VAC/ADMIN/RCVD: CPT | Performed by: NURSE PRACTITIONER

## 2020-01-21 RX ORDER — PREDNISONE 20 MG/1
20 TABLET ORAL 2 TIMES DAILY
Qty: 10 TABLET | Refills: 0 | Status: SHIPPED | OUTPATIENT
Start: 2020-01-21 | End: 2020-01-26

## 2020-01-21 RX ORDER — BENZONATATE 100 MG/1
100-200 CAPSULE ORAL 3 TIMES DAILY PRN
Qty: 30 CAPSULE | Refills: 0 | Status: SHIPPED | OUTPATIENT
Start: 2020-01-21 | End: 2020-01-28

## 2020-01-21 RX ORDER — AZITHROMYCIN 250 MG/1
TABLET, FILM COATED ORAL
Qty: 6 TABLET | Refills: 0 | Status: SHIPPED | OUTPATIENT
Start: 2020-01-21 | End: 2020-01-31

## 2020-01-21 ASSESSMENT — ENCOUNTER SYMPTOMS
NAUSEA: 0
SINUS PAIN: 0
SHORTNESS OF BREATH: 1
RHINORRHEA: 1
ABDOMINAL PAIN: 0
CHEST TIGHTNESS: 1
SINUS PRESSURE: 0
COUGH: 1

## 2020-01-21 NOTE — PROGRESS NOTES
Visit Information    Have you changed or started any medications since your last visit including any over-the-counter medicines, vitamins, or herbal medicines? no   Are you having any side effects from any of your medications? -  no  Have you stopped taking any of your medications? Is so, why? -  no    Have you seen any other physician or provider since your last visit? Yes - Records Obtained  Have you had any other diagnostic tests since your last visit? Yes - Records Obtained  Have you been seen in the emergency room and/or had an admission to a hospital since we last saw you? No  Have you had your routine dental cleaning in the past 6 months? no    Have you activated your Urvew account? If not, what are your barriers?  Yes     Patient Care Team:  JANIA Packer CNP as PCP - General (Certified Nurse Practitioner)  JANIA Packer CNP as PCP - [facility] Provider    Medical History Review  Past Medical, Family, and Social History reviewed and does not contribute to the patient presenting condition    Health Maintenance   Topic Date Due    Hepatitis C screen  1955    HIV screen  08/15/1969    Colon cancer screen colonoscopy  08/15/2004    Cervical cancer screen  10/05/2018    DEXA (modify frequency per FRAX score)  08/15/2019    Diabetes screen  09/30/2019    Annual Wellness Visit (AWV)  01/15/2020    Flu vaccine (1) 01/21/2021 (Originally 9/1/2019)    Breast cancer screen  11/16/2020    Lipid screen  01/15/2021    Pneumococcal 65+ years Vaccine (1 of 1 - PPSV23) 10/05/2022    DTaP/Tdap/Td vaccine (2 - Td) 10/05/2027    Shingles Vaccine  Completed

## 2020-01-21 NOTE — PROGRESS NOTES
Subjective:      Patient ID: Dinorah Mchugh is a 71 y.o. female. HPI: Acute for cough     Chief Complaint   Patient presents with    Cough     s/s x 2 weeks, Mucinex without improvement    Chest Congestion    Nasal Congestion        Onset of 2 weeks with cough. Mild chest tightness and SOB. Chest congestion. Feels like she cannot get congestion out of her chest.  Cough kept her up all night. Runny nose. Nasal congestion. No sinus pressure. Physically feels well. Smokes 1/2 ppd    OTC: Mucinex, Tylenol Severe Cold and Sinus    Vitals:    01/21/20 1102   BP: 128/76   Pulse: 68   Resp: 16   Temp: 98.5 °F (36.9 °C)   SpO2: 96%         Review of Systems   Constitutional: Negative for chills, fatigue and fever. HENT: Positive for congestion, postnasal drip and rhinorrhea. Negative for sinus pressure and sinus pain. Respiratory: Positive for cough, chest tightness and shortness of breath. Cardiovascular: Negative for chest pain. Gastrointestinal: Negative for abdominal pain and nausea. Skin: Negative for rash. Neurological: Negative for dizziness, light-headedness and headaches. Psychiatric/Behavioral: Negative. Objective:   Physical Exam  Constitutional:       General: She is not in acute distress. HENT:      Nose: Mucosal edema, congestion and rhinorrhea present. Eyes:      Pupils: Pupils are equal, round, and reactive to light. Neck:      Musculoskeletal: Normal range of motion and neck supple. Cardiovascular:      Rate and Rhythm: Normal rate and regular rhythm. Heart sounds: No murmur. Pulmonary:      Effort: Pulmonary effort is normal.      Breath sounds: Decreased breath sounds and rhonchi present. No wheezing. Abdominal:      General: Bowel sounds are normal. There is no distension. Palpations: Abdomen is soft. Tenderness: There is no tenderness. Musculoskeletal: Normal range of motion. General: No tenderness.    Skin:     General: Skin is warm and dry. Findings: No rash. Assessment:       Diagnosis Orders   1. Tracheitis  predniSONE (DELTASONE) 20 MG tablet    azithromycin (ZITHROMAX Z-CECILE) 250 MG tablet   2. Cough  benzonatate (TESSALON) 100 MG capsule   3.  Smoker             Plan:      Orders as above   Fluids and rest  Smoking cessation  RTO if symptoms worsen or stay the same              Cristy Estrada, JANIA - CNP

## 2020-01-21 NOTE — PATIENT INSTRUCTIONS
You may receive a survey about your visit with us today. The feedback from our patients helps us identify what is working well and where the service to all patients can be enhanced. Thank you! Tobacco Cessation Programs     Telephonic behavior modification  Brenda Landis ([phone])   Counseling service for those who are ready to quit using tobacco.     Available for uninsured [place] residents, [place] recipients, pregnant women, or patients whose health plans or employers are members of the [address] [place] [address] behavior modification   http://[place]. Quitlogix. org   Online support program to help patients through each step of the quitting process. Available 24 hours a day 7 days a week. Provides up to date researched based tool, step-by-step guides, and motivational messages. Online behavior modification   www.lungusa.org/stop-smoking/how-to-quit   HelpLine: 1-800-LUNG-[place] (540-4829)   Email questions to:  Ede@eefoof.com. TripleTree    Website offers resources to help tobacco users figure out their reasons for quitting and then take the big step of quitting for good. Hypnosis   Location: [address] [place] [address], [place] [address], [place]   Contact: Debbie Cooper, PhD at [phone]   Hypnosis for tobacco cessation   Cost $225 for the initial session and $175 for each session afterwards. Most patients require 6-8 sessions. There is the option to submit through the patients insurance. Hypnosis and behavior modification   Location: [address],  [address] [place] [address], [place]   Contact: Win Gill, PhD at [phone]  [facility] Counseling and hypnosis for nicotine addition   Cost: For uninsured patients:  Please call above phone number  Cost for insured patients depends on patients insurance plan.     Behavior modification   Location: [facility], [address] [place], [address]: [place] [address] include four one-on-one appointments between the patient and a

## 2020-01-28 ENCOUNTER — TELEPHONE (OUTPATIENT)
Dept: FAMILY MEDICINE CLINIC | Age: 65
End: 2020-01-28

## 2020-01-28 RX ORDER — LEVOFLOXACIN 500 MG/1
500 TABLET, FILM COATED ORAL DAILY
Qty: 10 TABLET | Refills: 0 | Status: SHIPPED | OUTPATIENT
Start: 2020-01-28 | End: 2020-02-04

## 2020-01-28 RX ORDER — HYDROCODONE POLISTIREX AND CHLORPHENIRAMINE POLISTIREX 10; 8 MG/5ML; MG/5ML
5 SUSPENSION, EXTENDED RELEASE ORAL EVERY 12 HOURS PRN
Qty: 120 ML | Refills: 0 | Status: SHIPPED | OUTPATIENT
Start: 2020-01-28 | End: 2020-02-04

## 2020-02-03 ENCOUNTER — TELEPHONE (OUTPATIENT)
Dept: FAMILY MEDICINE CLINIC | Age: 65
End: 2020-02-03

## 2020-02-04 ENCOUNTER — HOSPITAL ENCOUNTER (OUTPATIENT)
Dept: NON INVASIVE DIAGNOSTICS | Age: 65
Discharge: HOME | End: 2020-02-04
Payer: MEDICARE

## 2020-02-04 ENCOUNTER — OFFICE VISIT (OUTPATIENT)
Dept: FAMILY MEDICINE CLINIC | Age: 65
End: 2020-02-04
Payer: MEDICARE

## 2020-02-04 VITALS
TEMPERATURE: 98.1 F | RESPIRATION RATE: 16 BRPM | WEIGHT: 159.4 LBS | HEIGHT: 63 IN | BODY MASS INDEX: 28.24 KG/M2 | DIASTOLIC BLOOD PRESSURE: 68 MMHG | SYSTOLIC BLOOD PRESSURE: 134 MMHG | HEART RATE: 52 BPM

## 2020-02-04 PROBLEM — I63.9 ACUTE CVA (CEREBROVASCULAR ACCIDENT) (HCC): Status: ACTIVE | Noted: 2020-01-30

## 2020-02-04 PROCEDURE — G8400 PT W/DXA NO RESULTS DOC: HCPCS | Performed by: NURSE PRACTITIONER

## 2020-02-04 PROCEDURE — 99215 OFFICE O/P EST HI 40 MIN: CPT | Performed by: NURSE PRACTITIONER

## 2020-02-04 PROCEDURE — G8417 CALC BMI ABV UP PARAM F/U: HCPCS | Performed by: NURSE PRACTITIONER

## 2020-02-04 PROCEDURE — 93270 REMOTE 30 DAY ECG REV/REPORT: CPT

## 2020-02-04 PROCEDURE — 1123F ACP DISCUSS/DSCN MKR DOCD: CPT | Performed by: NURSE PRACTITIONER

## 2020-02-04 PROCEDURE — G8427 DOCREV CUR MEDS BY ELIG CLIN: HCPCS | Performed by: NURSE PRACTITIONER

## 2020-02-04 PROCEDURE — 4004F PT TOBACCO SCREEN RCVD TLK: CPT | Performed by: NURSE PRACTITIONER

## 2020-02-04 PROCEDURE — G8484 FLU IMMUNIZE NO ADMIN: HCPCS | Performed by: NURSE PRACTITIONER

## 2020-02-04 PROCEDURE — 1090F PRES/ABSN URINE INCON ASSESS: CPT | Performed by: NURSE PRACTITIONER

## 2020-02-04 PROCEDURE — 4040F PNEUMOC VAC/ADMIN/RCVD: CPT | Performed by: NURSE PRACTITIONER

## 2020-02-04 PROCEDURE — 3017F COLORECTAL CA SCREEN DOC REV: CPT | Performed by: NURSE PRACTITIONER

## 2020-02-04 RX ORDER — ATORVASTATIN CALCIUM 40 MG/1
40 TABLET, FILM COATED ORAL DAILY
COMMUNITY
End: 2020-02-05 | Stop reason: SDUPTHER

## 2020-02-04 RX ORDER — CLOPIDOGREL BISULFATE 75 MG/1
75 TABLET ORAL DAILY
COMMUNITY
End: 2020-02-05 | Stop reason: SDUPTHER

## 2020-02-04 RX ORDER — CLOPIDOGREL BISULFATE 75 MG/1
75 TABLET ORAL
COMMUNITY
Start: 2020-02-01 | End: 2020-02-10 | Stop reason: SDUPTHER

## 2020-02-04 RX ORDER — ATORVASTATIN CALCIUM 40 MG/1
40 TABLET, FILM COATED ORAL
COMMUNITY
Start: 2020-01-31 | End: 2020-02-10 | Stop reason: SDUPTHER

## 2020-02-04 ASSESSMENT — ENCOUNTER SYMPTOMS
NAUSEA: 0
COUGH: 0
ABDOMINAL PAIN: 0
SHORTNESS OF BREATH: 0

## 2020-02-04 NOTE — PATIENT INSTRUCTIONS
Patient Education        Deciding About Using Medicines To Quit Smoking  How can you decide about using medicines to quit smoking? What are the medicines you can use? Your doctor may prescribe varenicline (Chantix) or bupropion (Zyban). These medicines can help you cope with cravings for tobacco. They are pills that don't contain nicotine. You also can use nicotine replacement products. These do contain nicotine. There are many types. · Gum and lozenges slowly release nicotine into your mouth. · Patches stick to your skin. They slowly release nicotine into your bloodstream.  · An inhaler has a dodge that contains nicotine. You breathe in a puff of nicotine vapor through your mouth and throat. · Nasal spray releases a mist that contains nicotine. What are key points about this decision? · Using medicines can double your chances of quitting smoking. They can ease cravings and withdrawal symptoms. · Getting counseling along with using medicine can raise your chances of quitting even more. · If you smoke fewer than 5 cigarettes a day, you may not need medicines to help you quit smoking. · These medicines have less nicotine than cigarettes. And by itself, nicotine is not nearly as harmful as smoking. The tars, carbon monoxide, and other toxic chemicals in tobacco cause the harmful effects. · The side effects of nicotine replacement products depend on the type of product. For example, a patch can make your skin red and itchy. Medicines in pill form can make you sick to your stomach. They can also cause dry mouth and trouble sleeping. For most people, the side effects are not bad enough to make them stop using the products. Why might you choose to use medicines to quit smoking? · You have tried on your own to stop smoking, but you were not able to stop. · You smoke more than 5 cigarettes a day. · You want to increase your chances of quitting smoking.   · You want to reduce your cravings and withdrawal symptoms. · You feel the benefits of medicine outweigh the side effects. Why might you choose not to use medicine? · You want to try quitting on your own by stopping all at once (\"cold turkey\"). · You want to cut back slowly on the number of cigarettes you smoke. · You smoke fewer than 5 cigarettes a day. · You do not like using medicine. · You feel the side effects of medicines outweigh the benefits. · You are worried about the cost of medicines. Your decision  Thinking about the facts and your feelings can help you make a decision that is right for you. Be sure you understand the benefits and risks of your options, and think about what else you need to do before you make the decision. Where can you learn more? Go to https://AxialpeNix Hydra.MONOQI. org and sign in to your fotopedia account. Enter K456 in the HashParade box to learn more about \"Deciding About Using Medicines To Quit Smoking. \"     If you do not have an account, please click on the \"Sign Up Now\" link. Current as of: July 4, 2019  Content Version: 12.3  © 7127-6388 Healthwise, Incorporated. Care instructions adapted under license by [facility] ([facility]). If you have questions about a medical condition or this instruction, always ask your healthcare professional. [address] any warranty or liability for your use of this information.

## 2020-02-04 NOTE — PROGRESS NOTES
by mouth 2 (two) times a day.   0       rOPINIRole (REQUIP) 3 mg tablet   Take 3 mg by mouth nightly.   0       sertraline (ZOLOFT) 50 mg tablet   Take 50 mg by mouth daily.   0       Ordered Prescriptions  - documented in this encounter  Reconcile with Patient's Chart  Prescription Sig Dispensed Refills Start Date End Date   clopidogrel (PLAVIX) 75 mg tablet   Take 1 tablet (75 mg total) by mouth daily. 30 tablet   5 02/01/2020     atorvastatin (LIPITOR) 40 mg tablet   Take 1 tablet (40 mg total) by mouth nightly.  30 tablet   2 01/31/2020           Patient Active Problem List   Diagnosis    Coronary artery disease involving native coronary artery of native heart without angina pectoris    S/P angioplasty with stent-1995 and 1996- in [address] Pure hypercholesterolemia    Tobacco abuse    Cardiomyopathy ([facility] EF 40%    Acute CVA (cerebrovascular accident) ([facility]         BP Readings from Last 3 Encounters:   02/04/20 134/68   01/21/20 128/76   01/15/20 (!) 110/58         Lab Results   Component Value Date    LABA1C 5.3 09/30/2016     No results found for: EAG    No components found for: CHLPL  Lab Results   Component Value Date    TRIG 102 01/15/2020    TRIG 195 10/22/2018    TRIG 194 09/30/2016     Lab Results   Component Value Date    HDL 45 01/15/2020    HDL 43 10/22/2018    HDL 36 09/30/2016     Lab Results   Component Value Date    LDLCALC 77 01/15/2020    LDLCALC 83 10/22/2018    LDLCALC 102 09/30/2016     No results found for: LABVLDL      Chemistry        Component Value Date/Time     01/29/2020 2230    K 3.7 01/29/2020 2230     01/29/2020 2230    CO2 26 01/29/2020 2230    BUN 18 01/29/2020 2230    CREATININE 0.81 01/29/2020 2230        Component Value Date/Time    CALCIUM 8.90 01/29/2020 2230    ALKPHOS 95 01/29/2020 2230    AST 14 (L) 01/29/2020 2230    ALT 19 01/29/2020 2230    BILITOT 0.3 01/29/2020 2230            Lab Results   Component Value Date    TSH 2.880 09/30/2016       Lab Results   Component Value Date    WBC 14.5 (H) 01/29/2020    HGB 13.0 01/29/2020    HCT 39.4 01/29/2020    MCV 92.2 01/29/2020     01/29/2020         Health Maintenance   Topic Date Due    Hepatitis C screen  1955    HIV screen  08/15/1969    Colon cancer screen colonoscopy  08/15/2004    Cervical cancer screen  10/05/2018    DEXA (modify frequency per FRAX score)  08/15/2019    Diabetes screen  09/30/2019    Annual Wellness Visit (AWV)  01/15/2020    Flu vaccine (1) 01/21/2021 (Originally 9/1/2019)    Breast cancer screen  11/16/2020    Lipid screen  01/15/2021    Pneumococcal 65+ years Vaccine (1 of 1 - PPSV23) 10/05/2022    DTaP/Tdap/Td vaccine (2 - Td) 10/05/2027    Shingles Vaccine  Completed       Immunization History   Administered Date(s) Administered    Influenza, Quadv, IM, (6 mo and older Fluzone, Flulaval, Fluarix and 3 yrs and older Afluria) 10/05/2017, 10/08/2018    Pneumococcal Polysaccharide ([id number]) 10/05/2017    Tdap (Boostrix, Adacel) 10/05/2017    Zoster Live (Zostavax) 10/05/2017    Zoster Recombinant (Shingrix) 10/08/2018, 02/04/2019       Review of Systems   Constitutional: Negative for chills and fever. HENT: Negative. Respiratory: Negative for cough and shortness of breath. Cardiovascular: Negative for chest pain. Gastrointestinal: Negative for abdominal pain and nausea. Skin: Negative for rash. Neurological: Negative for dizziness, light-headedness and headaches. Psychiatric/Behavioral: Negative. Objective:   Physical Exam  Constitutional:       General: She is not in acute distress. Eyes:      Pupils: Pupils are equal, round, and reactive to light. Pupils are equal.      Right eye: Pupil is reactive and not sluggish. Left eye: Pupil is reactive and not sluggish. Neck:      Musculoskeletal: Normal range of motion and neck supple. Cardiovascular:      Rate and Rhythm: Normal rate and regular rhythm.

## 2020-02-05 ENCOUNTER — OFFICE VISIT (OUTPATIENT)
Dept: CARDIOLOGY CLINIC | Age: 65
End: 2020-02-05
Payer: MEDICARE

## 2020-02-05 VITALS
HEIGHT: 63 IN | BODY MASS INDEX: 28.35 KG/M2 | DIASTOLIC BLOOD PRESSURE: 61 MMHG | WEIGHT: 160 LBS | HEART RATE: 55 BPM | SYSTOLIC BLOOD PRESSURE: 114 MMHG

## 2020-02-05 PROCEDURE — 1090F PRES/ABSN URINE INCON ASSESS: CPT | Performed by: PHYSICIAN ASSISTANT

## 2020-02-05 PROCEDURE — 1123F ACP DISCUSS/DSCN MKR DOCD: CPT | Performed by: PHYSICIAN ASSISTANT

## 2020-02-05 PROCEDURE — G8427 DOCREV CUR MEDS BY ELIG CLIN: HCPCS | Performed by: PHYSICIAN ASSISTANT

## 2020-02-05 PROCEDURE — 3017F COLORECTAL CA SCREEN DOC REV: CPT | Performed by: PHYSICIAN ASSISTANT

## 2020-02-05 PROCEDURE — G8484 FLU IMMUNIZE NO ADMIN: HCPCS | Performed by: PHYSICIAN ASSISTANT

## 2020-02-05 PROCEDURE — 4040F PNEUMOC VAC/ADMIN/RCVD: CPT | Performed by: PHYSICIAN ASSISTANT

## 2020-02-05 PROCEDURE — G8417 CALC BMI ABV UP PARAM F/U: HCPCS | Performed by: PHYSICIAN ASSISTANT

## 2020-02-05 PROCEDURE — 99213 OFFICE O/P EST LOW 20 MIN: CPT | Performed by: PHYSICIAN ASSISTANT

## 2020-02-05 PROCEDURE — G8400 PT W/DXA NO RESULTS DOC: HCPCS | Performed by: PHYSICIAN ASSISTANT

## 2020-02-05 PROCEDURE — 4004F PT TOBACCO SCREEN RCVD TLK: CPT | Performed by: PHYSICIAN ASSISTANT

## 2020-02-05 NOTE — PROGRESS NOTES
C/o headaches, occasional palpitations, fatigue    Pt Denies CP, SOB, Dizziness, Swelling     Currently wearing a cardiac event monitor till march 5, was put on yesterday    Needs a leave of work form from [employer] 29th-feb 17th
Socioeconomic History    Marital status:      Spouse name: None    Number of children: None    Years of education: None    Highest education level: None   Occupational History    None   Social Needs    Financial resource strain: None    Food insecurity:     Worry: None     Inability: None    Transportation needs:     Medical: None     Non-medical: None   Tobacco Use    Smoking status: Current Every Day Smoker     Packs/day: 0.50     Years: 52.00     Pack years: 26.00     Types: Cigarettes     Start date: 12/6/1966    Smokeless tobacco: Never Used   Substance and Sexual Activity    Alcohol use: Yes     Alcohol/week: 5.0 - 6.0 standard drinks     Types: 5 - 6 Cans of beer per week    Drug use: No    Sexual activity: None   Lifestyle    Physical activity:     Days per week: None     Minutes per session: None    Stress: None   Relationships    Social connections:     Talks on phone: None     Gets together: None     Attends Lutheran service: None     Active member of club or organization: None     Attends meetings of clubs or organizations: None     Relationship status: None    Intimate partner violence:     Fear of current or ex partner: None     Emotionally abused: None     Physically abused: None     Forced sexual activity: None   Other Topics Concern    None   Social History Narrative    None       Family History   Problem Relation Age of Onset    Cancer Mother         lung cancer    Other Father         pneumonia    Other Sister         alcohol poisoning    Cancer Sister         female organs       Blood pressure 114/61, pulse 55, height 5' 3\" (1.6 m), weight 160 lb (72.6 kg), not currently breastfeeding.     General:   Well developed, well nourished  Lungs:   Clear to auscultation  Heart:    Normal S1 S2, No murmur, rubs, or gallops  Abdomen:   Soft, non tender, no organomegalies, positive bowel sounds  Extremities:   No edema, no cyanosis, good peripheral pulses  Neurological:

## 2020-02-10 RX ORDER — CLOPIDOGREL BISULFATE 75 MG/1
75 TABLET ORAL DAILY
Qty: 30 TABLET | Refills: 11 | Status: SHIPPED | OUTPATIENT
Start: 2020-02-10 | End: 2020-02-26 | Stop reason: ALTCHOICE

## 2020-02-10 RX ORDER — ATORVASTATIN CALCIUM 40 MG/1
40 TABLET, FILM COATED ORAL DAILY
Qty: 30 TABLET | Refills: 11 | Status: SHIPPED | OUTPATIENT
Start: 2020-02-10 | End: 2021-08-31 | Stop reason: DRUGHIGH

## 2020-02-17 RX ORDER — ATENOLOL 50 MG/1
TABLET ORAL
Qty: 90 TABLET | Refills: 0 | Status: SHIPPED | OUTPATIENT
Start: 2020-02-17 | End: 2020-03-13 | Stop reason: DRUGHIGH

## 2020-02-17 RX ORDER — ROPINIROLE 3 MG/1
TABLET, FILM COATED ORAL
Qty: 90 TABLET | Refills: 0 | Status: SHIPPED | OUTPATIENT
Start: 2020-02-17 | End: 2020-04-22 | Stop reason: SDUPTHER

## 2020-02-26 ENCOUNTER — OFFICE VISIT (OUTPATIENT)
Dept: FAMILY MEDICINE CLINIC | Age: 65
End: 2020-02-26
Payer: MEDICARE

## 2020-02-26 VITALS
RESPIRATION RATE: 12 BRPM | WEIGHT: 160.6 LBS | SYSTOLIC BLOOD PRESSURE: 122 MMHG | HEART RATE: 56 BPM | BODY MASS INDEX: 28.45 KG/M2 | DIASTOLIC BLOOD PRESSURE: 74 MMHG

## 2020-02-26 PROCEDURE — G8427 DOCREV CUR MEDS BY ELIG CLIN: HCPCS | Performed by: NURSE PRACTITIONER

## 2020-02-26 PROCEDURE — G8417 CALC BMI ABV UP PARAM F/U: HCPCS | Performed by: NURSE PRACTITIONER

## 2020-02-26 PROCEDURE — 4040F PNEUMOC VAC/ADMIN/RCVD: CPT | Performed by: NURSE PRACTITIONER

## 2020-02-26 PROCEDURE — 4004F PT TOBACCO SCREEN RCVD TLK: CPT | Performed by: NURSE PRACTITIONER

## 2020-02-26 PROCEDURE — G8484 FLU IMMUNIZE NO ADMIN: HCPCS | Performed by: NURSE PRACTITIONER

## 2020-02-26 PROCEDURE — 1123F ACP DISCUSS/DSCN MKR DOCD: CPT | Performed by: NURSE PRACTITIONER

## 2020-02-26 PROCEDURE — 1090F PRES/ABSN URINE INCON ASSESS: CPT | Performed by: NURSE PRACTITIONER

## 2020-02-26 PROCEDURE — G8400 PT W/DXA NO RESULTS DOC: HCPCS | Performed by: NURSE PRACTITIONER

## 2020-02-26 PROCEDURE — 99214 OFFICE O/P EST MOD 30 MIN: CPT | Performed by: NURSE PRACTITIONER

## 2020-02-26 PROCEDURE — 3017F COLORECTAL CA SCREEN DOC REV: CPT | Performed by: NURSE PRACTITIONER

## 2020-02-26 RX ORDER — APIXABAN 5 MG/1
TABLET, FILM COATED ORAL
COMMUNITY
Start: 2020-02-21 | End: 2020-02-26 | Stop reason: SDUPTHER

## 2020-02-26 ASSESSMENT — ENCOUNTER SYMPTOMS
COUGH: 0
SHORTNESS OF BREATH: 0
NAUSEA: 0
ABDOMINAL PAIN: 0

## 2020-02-26 NOTE — PROGRESS NOTES
Visit Information    Have you changed or started any medications since your last visit including any over-the-counter medicines, vitamins, or herbal medicines? yes - list updated   Are you having any side effects from any of your medications? -  no  Have you stopped taking any of your medications? Is so, why? -  yes - list updated    Have you seen any other physician or provider since your last visit? Yes - Records Obtained  Have you had any other diagnostic tests since your last visit? Yes - Records Obtained  Have you been seen in the emergency room and/or had an admission to a hospital since we last saw you? Yes - Records Obtained    Have you activated your [employer] account? If not, what are your barriers?  Yes     Patient Care Team:  JANIA Hopkins CNP as PCP - General (Certified Nurse Practitioner)  JANIA Hopkins CNP as PCP - [facility] Provider  Emogene Aschoff, MD as Consulting Physician (Cardiology)    Medical History Review  Past Medical, Family, and Social History reviewed and does contribute to the patient presenting condition    Health Maintenance   Topic Date Due    Hepatitis C screen  1955    HIV screen  08/15/1969    Colon cancer screen colonoscopy  08/15/2004    Cervical cancer screen  10/05/2018    DEXA (modify frequency per FRAX score)  08/15/2019    Diabetes screen  09/30/2019    Annual Wellness Visit (AWV)  01/15/2020    Flu vaccine (1) 01/21/2021 (Originally 9/1/2019)    Breast cancer screen  11/16/2020    Lipid screen  01/15/2021    Pneumococcal 65+ years Vaccine (1 of 1 - PPSV23) 10/05/2022    DTaP/Tdap/Td vaccine (2 - Td) 10/05/2027    Shingles Vaccine  Completed    Hepatitis A vaccine  Aged Out    Hepatitis B vaccine  Aged Out    Hib vaccine  Aged Out    Meningococcal (ACWY) vaccine  Aged Out

## 2020-02-26 NOTE — PROGRESS NOTES
Subjective:      Patient ID: Meghan Ribera is a 71 y.o. female. HPI: [facility] Follow Up    Chief Complaint   Patient presents with    Follow-Up from [address] Colonoscopy     would like referral     Was admitted 2/18/20 through 2/19/20 for AFIB with RVR at [facility]. Was placed on Atenolol and Eliquis. Recent CVA 1 month ago with unknown origin. Was able to push TPA at that time which resolved symptoms and did not have any residual.   Was in process of CARDIAC MONITOR until AFIB with RVR sent her to hospital.   Taken off Plavix. Complains of fatigue since hospitalization. [facility] Shown Dr. Lyly Wallace 3/6/20    Vitals:    02/26/20 0913   BP: 122/74   Pulse: 56   Resp: 12         Review of Systems   Constitutional: Positive for fatigue. Negative for chills and fever. HENT: Negative. Respiratory: Negative for cough and shortness of breath. Cardiovascular: Negative for chest pain. Gastrointestinal: Negative for abdominal pain and nausea. Skin: Negative for rash. Neurological: Negative for dizziness, light-headedness and headaches. Psychiatric/Behavioral: Negative. Objective:   Physical Exam  Constitutional:       General: She is not in acute distress. Eyes:      Pupils: Pupils are equal, round, and reactive to light. Neck:      Musculoskeletal: Normal range of motion and neck supple. Cardiovascular:      Rate and Rhythm: Normal rate and regular rhythm. No extrasystoles are present. Heart sounds: Normal heart sounds, S1 normal and S2 normal. No murmur. Pulmonary:      Effort: Pulmonary effort is normal.      Breath sounds: Normal breath sounds. No wheezing. Abdominal:      General: Bowel sounds are normal. There is no distension. Palpations: Abdomen is soft. Tenderness: There is no abdominal tenderness. Musculoskeletal: Normal range of motion. General: No tenderness. Skin:     General: Skin is warm and dry. Findings: No rash.

## 2020-03-09 ENCOUNTER — TELEPHONE (OUTPATIENT)
Dept: CARDIOLOGY CLINIC | Age: 65
End: 2020-03-09

## 2020-03-10 NOTE — TELEPHONE ENCOUNTER
Decrease her atenolol to 25 mg daily. She needs to be seen by Dr. Ivana Alexander to be evaluated for pacemaker.

## 2020-03-10 NOTE — PROCEDURES
[address] [place] [address]                                 EVENT MONITOR    PATIENT NAME: Jimmy Alcantara               :        1955  MED REC NO:   [id number]                           ROOM:  ACCOUNT NO:   [de-identified]                           ADMIT DATE: 2020  PROVIDER:     Astrid Bustamante M.D.      TEST TYPE:  Event monitor. CLINICAL HISTORY AND INDICATION:  This is a patient with stroke. EVENT MONITOR DESCRIPTION:  Event monitor was attached to the patient  between 2020 to 2020. EVENT MONITOR FINDINGS:  Baseline rhythm showed sinus rhythm. There was  an episode of narrow complex tachycardia, possibly atrial fibrillation,  atrial flutter, heart rate 170 beats per minute and there were episodes  of pauses with a pause up to more than 6 seconds. There were several  other episodes of intermittent atrial fibrillation. This is an abnormal  event monitor needs further attention for evaluation of possible  pacemaker. CONCLUSION:  Abnormal event monitor with episodes of intermittent atrial  fibrillation as well as pauses. Needs further attention for further  evaluation of treatment.         Isaura Roman M.D.    D: 2020 16:12:52       T: 2020 17:06:43     [email or web address]  Job#: [id number]     Doc#: [id number]    CC:

## 2020-03-13 RX ORDER — ATENOLOL 25 MG/1
25 TABLET ORAL DAILY
COMMUNITY
End: 2020-04-17

## 2020-04-09 ENCOUNTER — TELEPHONE (OUTPATIENT)
Dept: FAMILY MEDICINE CLINIC | Age: 65
End: 2020-04-09

## 2020-04-09 NOTE — TELEPHONE ENCOUNTER
.Transition of Care visit scheduled.   4/17/2020  Patient is being discharged to home  Date of discharge 4/9  Discharge from facility [facility]  Reason for admission afib, cardiac stent plcmt

## 2020-04-10 ENCOUNTER — TELEPHONE (OUTPATIENT)
Dept: FAMILY MEDICINE CLINIC | Age: 65
End: 2020-04-10

## 2020-04-10 NOTE — TELEPHONE ENCOUNTER
Domin[facility] Transitions Initial Follow Up Call    Outreach made within 2 business days of discharge: Yes    Patient: Marybeth Olmedo Patient : 1955   MRN: [id number]  Reason for Admission: No discharge information exists for this patient. Discharge Date:         Spoke with: attempted to contact pt, left vm.     Discharge department/facility: [facility]    TCM Interactive Patient Contact:      Scheduled appointment with PCP within 7-14 days    Follow Up  Future Appointments   Date Time Provider Salena Araiza   2020 10:30 AM JANIA Pelayo   2020 10:45 AM JANIA Pelayo [address], [address] ([address] [place] [address])

## 2020-04-13 NOTE — TELEPHONE ENCOUNTER
Domin[facility] Transitions Initial Follow Up Call    Outreach made within 2 business days of discharge: Yes    Patient: Maty Jaramillo Patient : 1955   MRN: [id number]  Reason for Admission: No discharge information exists for this patient. Discharge Date:         Spoke with: attempted to contact pt, left vm.     Discharge department/facility: [facility] Interactive Patient Contact:      Scheduled appointment with PCP within 7-14 days    Follow Up  Future Appointments   Date Time Provider Salena Araiza   2020 10:30 AM JANIA Shepherd   2020 10:45 AM JANIA Shepherd [address] [place] [address] ([address] [place] [address])

## 2020-04-15 ENCOUNTER — TELEPHONE (OUTPATIENT)
Dept: FAMILY MEDICINE CLINIC | Age: 65
End: 2020-04-15

## 2020-04-15 NOTE — TELEPHONE ENCOUNTER
LVM for patient to call the office. On return call please relay, requested work letter was approved and faxed to the number listed below.

## 2020-04-17 ENCOUNTER — TELEPHONE (OUTPATIENT)
Dept: FAMILY MEDICINE CLINIC | Age: 65
End: 2020-04-17

## 2020-04-17 ENCOUNTER — VIRTUAL VISIT (OUTPATIENT)
Dept: FAMILY MEDICINE CLINIC | Age: 65
End: 2020-04-17
Payer: MEDICARE

## 2020-04-17 PROCEDURE — 4040F PNEUMOC VAC/ADMIN/RCVD: CPT | Performed by: NURSE PRACTITIONER

## 2020-04-17 PROCEDURE — 99214 OFFICE O/P EST MOD 30 MIN: CPT | Performed by: NURSE PRACTITIONER

## 2020-04-17 PROCEDURE — 1123F ACP DISCUSS/DSCN MKR DOCD: CPT | Performed by: NURSE PRACTITIONER

## 2020-04-17 PROCEDURE — 3017F COLORECTAL CA SCREEN DOC REV: CPT | Performed by: NURSE PRACTITIONER

## 2020-04-17 PROCEDURE — G8400 PT W/DXA NO RESULTS DOC: HCPCS | Performed by: NURSE PRACTITIONER

## 2020-04-17 PROCEDURE — 1090F PRES/ABSN URINE INCON ASSESS: CPT | Performed by: NURSE PRACTITIONER

## 2020-04-17 PROCEDURE — G8428 CUR MEDS NOT DOCUMENT: HCPCS | Performed by: NURSE PRACTITIONER

## 2020-04-17 RX ORDER — CLOPIDOGREL BISULFATE 75 MG/1
TABLET ORAL
Qty: 90 TABLET | Refills: 3 | Status: SHIPPED | OUTPATIENT
Start: 2020-04-17 | End: 2021-09-09 | Stop reason: SDUPTHER

## 2020-04-17 RX ORDER — CLOPIDOGREL BISULFATE 75 MG/1
TABLET ORAL
COMMUNITY
Start: 2020-04-09 | End: 2020-04-17 | Stop reason: SDUPTHER

## 2020-04-17 ASSESSMENT — ENCOUNTER SYMPTOMS
SHORTNESS OF BREATH: 0
CHEST TIGHTNESS: 0
NAUSEA: 0
ABDOMINAL PAIN: 0
COUGH: 0

## 2020-04-17 NOTE — Clinical Note
Please call to get records from [facility] for Hospitalization 4/6 through 4/9. Also cancel 4/21 appointment and push out 3 months. Thanks!

## 2020-04-17 NOTE — TELEPHONE ENCOUNTER
----- Message from JANIA Shepherd CNP sent at 4/17/2020 10:42 AM EDT -----  Please call to get records from [facility] for Hospitalization 4/6 through 4/9. Also cancel 4/21 appointment and push out 3 months. Thanks!

## 2020-04-20 ENCOUNTER — TELEPHONE (OUTPATIENT)
Dept: FAMILY MEDICINE CLINIC | Age: 65
End: 2020-04-20

## 2020-04-20 NOTE — TELEPHONE ENCOUNTER
Called [facility] medical records 4/17/2020 they will be faxing hospital records to the office. LVM for patient to call the office to make a 3 month fup appointment.    4/20/2020 appointment cancelled per provider request.

## 2020-04-22 RX ORDER — ROPINIROLE 3 MG/1
TABLET, FILM COATED ORAL
Qty: 90 TABLET | Refills: 3 | Status: SHIPPED | OUTPATIENT
Start: 2020-04-22 | End: 2021-04-12 | Stop reason: SDUPTHER

## 2020-06-30 ENCOUNTER — VIRTUAL VISIT (OUTPATIENT)
Dept: FAMILY MEDICINE CLINIC | Age: 65
End: 2020-06-30
Payer: MEDICARE

## 2020-06-30 PROCEDURE — 99213 OFFICE O/P EST LOW 20 MIN: CPT | Performed by: NURSE PRACTITIONER

## 2020-07-01 ASSESSMENT — ENCOUNTER SYMPTOMS
SORE THROAT: 0
SHORTNESS OF BREATH: 0
COUGH: 0

## 2020-07-08 ENCOUNTER — TELEPHONE (OUTPATIENT)
Dept: FAMILY MEDICINE CLINIC | Age: 65
End: 2020-07-08

## 2020-07-08 LAB
ANION GAP SERPL CALCULATED.3IONS-SCNC: 6 MMOL/L (ref 4–12)
BUN BLDV-MCNC: 15 MG/DL (ref 7–20)
CALCIUM SERPL-MCNC: 9.1 MG/DL (ref 8.8–10.5)
CHLORIDE BLD-SCNC: 107 MEQ/L (ref 101–111)
CO2: 28 MEQ/L (ref 21–32)
CREAT SERPL-MCNC: 0.88 MG/DL (ref 0.6–1.3)
CREATININE CLEARANCE: >60
ESTIMATED AVERAGE GLUCOSE: 117 MG/DL
GLUCOSE: 113 MG/DL (ref 70–110)
HBA1C MFR BLD: 5.7 % (ref 4.4–6.4)
POTASSIUM SERPL-SCNC: 3.6 MEQ/L (ref 3.6–5)
SODIUM BLD-SCNC: 141 MEQ/L (ref 135–145)

## 2020-08-26 ENCOUNTER — TELEPHONE (OUTPATIENT)
Dept: FAMILY MEDICINE CLINIC | Age: 65
End: 2020-08-26

## 2020-08-26 NOTE — TELEPHONE ENCOUNTER
Pt called office stating she is at her dentist Dr. Kristian Veliz right now with a tooth ache, they will not do anything for her without an updated medication list. Pt asked that we fax this to them at [address] [facility] at [phone]. Done, I faxed this to the number provided.

## 2020-09-14 RX ORDER — PRAMIPEXOLE DIHYDROCHLORIDE 0.5 MG/1
TABLET ORAL
Qty: 180 TABLET | Refills: 3 | Status: SHIPPED | OUTPATIENT
Start: 2020-09-14 | End: 2021-09-09 | Stop reason: SDUPTHER

## 2020-09-29 ENCOUNTER — OFFICE VISIT (OUTPATIENT)
Dept: FAMILY MEDICINE CLINIC | Age: 65
End: 2020-09-29
Payer: MEDICARE

## 2020-09-29 VITALS
SYSTOLIC BLOOD PRESSURE: 118 MMHG | DIASTOLIC BLOOD PRESSURE: 60 MMHG | TEMPERATURE: 97 F | RESPIRATION RATE: 18 BRPM | OXYGEN SATURATION: 98 % | HEART RATE: 60 BPM | BODY MASS INDEX: 29.55 KG/M2 | WEIGHT: 166.8 LBS

## 2020-09-29 PROBLEM — J45.20 MILD INTERMITTENT ASTHMA WITHOUT COMPLICATION: Status: ACTIVE | Noted: 2020-09-29

## 2020-09-29 PROCEDURE — 4004F PT TOBACCO SCREEN RCVD TLK: CPT | Performed by: NURSE PRACTITIONER

## 2020-09-29 PROCEDURE — 1090F PRES/ABSN URINE INCON ASSESS: CPT | Performed by: NURSE PRACTITIONER

## 2020-09-29 PROCEDURE — 99213 OFFICE O/P EST LOW 20 MIN: CPT | Performed by: NURSE PRACTITIONER

## 2020-09-29 PROCEDURE — 3017F COLORECTAL CA SCREEN DOC REV: CPT | Performed by: NURSE PRACTITIONER

## 2020-09-29 PROCEDURE — 1123F ACP DISCUSS/DSCN MKR DOCD: CPT | Performed by: NURSE PRACTITIONER

## 2020-09-29 PROCEDURE — G8427 DOCREV CUR MEDS BY ELIG CLIN: HCPCS | Performed by: NURSE PRACTITIONER

## 2020-09-29 PROCEDURE — G8417 CALC BMI ABV UP PARAM F/U: HCPCS | Performed by: NURSE PRACTITIONER

## 2020-09-29 PROCEDURE — 4040F PNEUMOC VAC/ADMIN/RCVD: CPT | Performed by: NURSE PRACTITIONER

## 2020-09-29 PROCEDURE — G8400 PT W/DXA NO RESULTS DOC: HCPCS | Performed by: NURSE PRACTITIONER

## 2020-09-29 RX ORDER — PREDNISONE 20 MG/1
20 TABLET ORAL 2 TIMES DAILY
Qty: 10 TABLET | Refills: 0 | Status: SHIPPED | OUTPATIENT
Start: 2020-09-29 | End: 2020-10-04

## 2020-09-29 SDOH — ECONOMIC STABILITY: TRANSPORTATION INSECURITY
IN THE PAST 12 MONTHS, HAS LACK OF TRANSPORTATION KEPT YOU FROM MEETINGS, WORK, OR FROM GETTING THINGS NEEDED FOR DAILY LIVING?: NO

## 2020-09-29 SDOH — ECONOMIC STABILITY: FOOD INSECURITY: WITHIN THE PAST 12 MONTHS, THE FOOD YOU BOUGHT JUST DIDN'T LAST AND YOU DIDN'T HAVE MONEY TO GET MORE.: NEVER TRUE

## 2020-09-29 SDOH — ECONOMIC STABILITY: TRANSPORTATION INSECURITY
IN THE PAST 12 MONTHS, HAS THE LACK OF TRANSPORTATION KEPT YOU FROM MEDICAL APPOINTMENTS OR FROM GETTING MEDICATIONS?: NO

## 2020-09-29 SDOH — ECONOMIC STABILITY: INCOME INSECURITY: HOW HARD IS IT FOR YOU TO PAY FOR THE VERY BASICS LIKE FOOD, HOUSING, MEDICAL CARE, AND HEATING?: NOT HARD AT ALL

## 2020-09-29 SDOH — ECONOMIC STABILITY: FOOD INSECURITY: WITHIN THE PAST 12 MONTHS, YOU WORRIED THAT YOUR FOOD WOULD RUN OUT BEFORE YOU GOT MONEY TO BUY MORE.: NEVER TRUE

## 2020-09-29 ASSESSMENT — ENCOUNTER SYMPTOMS: RESPIRATORY NEGATIVE: 1

## 2020-09-29 NOTE — PATIENT INSTRUCTIONS
You may receive a survey regarding the care you received during your visit. Your input is valuable to us. We encourage you to complete and return your survey. We hope you will choose us in the future for your healthcare needs. Tobacco Cessation Programs     Telephonic behavior modification  Cherylene Chars ([phone])   Counseling service for those who are ready to quit using tobacco.     Available for uninsured [place] residents, [place] recipients, pregnant women, or patients whose health plans or employers are members of the [address] [place] [address] behavior modification   http://[place]. Quitlogix. org   Online support program to help patients through each step of the quitting process. Available 24 hours a day 7 days a week. Provides up to date researched based tool, step-by-step guides, and motivational messages. Online behavior modification   www.lungusa.org/stop-smoking/how-to-quit   HelpLine: [facility] (203-6700)   Email questions to:  Socrates@[employer]. [place]    Website offers resources to help tobacco users figure out their reasons for quitting and then take the big step of quitting for good. Hypnosis   Location: [address], [place], [place]   Contact: Nathan Hyde, PhD at [phone]   Hypnosis for tobacco cessation   Cost $225 for the initial session and $175 for each session afterwards. Most patients require 6-8 sessions. There is the option to submit through the patients insurance. Hypnosis and behavior modification   Location: [place] [address],  [facility] [address] [place] [employer] [place], [place]   Contact: Idris Choi, PhD at [phone]  [facility] Counseling and hypnosis for nicotine addition   Cost: For uninsured patients:  Please call above phone number  Cost for insured patients depends on patients insurance plan.     Behavior modification   Location: [facility], [address] [facility] [address] [place], [address]: [address] four one-on-one appointments between the patient and a respiratory therapist.  The four appointments span over three weeks. The respiratory therapist schedules one of the appointments to occur 48 hours after the patients quit date.  Cost $100 total for the four sessions. Tobacco cessation products are not included in the cost and are not provided by [facility].         Patient Education        Plantar Fasciitis: Care Instructions  Your Care Instructions     Plantar fasciitis is pain and inflammation of the plantar fascia, the tissue at the bottom of your foot that connects the heel bone to the toes. The plantar fascia also supports the arch. If you strain the plantar fascia, it can develop small tears and cause heel pain when you stand or walk. Plantar fasciitis can be caused by running or other sports. It also may occur in people who are overweight or who have high arches or flat feet. You may get plantar fasciitis if you walk or stand for long periods, or have a tight Achilles tendon or calf muscles. You can improve your foot pain with rest and other care at home. It might take a few weeks to a few months for your foot to heal completely. Follow-up care is a key part of your treatment and safety. Be sure to make and go to all appointments, and call your doctor if you are having problems. It's also a good idea to know your test results and keep a list of the medicines you take. How can you care for yourself at home? · Rest your feet often. Reduce your activity to a level that lets you avoid pain. If possible, do not run or walk on hard surfaces. · Take pain medicines exactly as directed. ? If the doctor gave you a prescription medicine for pain, take it as prescribed. ? If you are not taking a prescription pain medicine, take an over-the-counter anti-inflammatory medicine for pain and swelling, such as ibuprofen (Advil, Motrin) or naproxen (Aleve). Read and follow all instructions on the label.   · Use ice massage to help with pain and swelling. You can use an ice cube or an ice cup several times a day. To make an ice cup, fill a paper cup with water and freeze it. Cut off the top of the cup until a half-inch of ice shows. Hold onto the remaining paper to use the cup. Rub the ice in small circles over the area for 5 to 7 minutes. · Contrast baths, which alternate hot and cold water, can also help reduce swelling. But because heat alone may make pain and swelling worse, end a contrast bath with a soak in cold water. · Wear a night splint if your doctor suggests it. A night splint holds your foot with the toes pointed up and the foot and ankle at a 90-degree angle. This position gives the bottom of your foot a constant, gentle stretch. · Do simple exercises such as calf stretches and towel stretches 2 to 3 times each day, especially when you first get up in the morning. These can help the plantar fascia become more flexible. They also make the muscles that support your arch stronger. Hold these stretches for 15 to 30 seconds per stretch. Repeat 2 to 4 times. ? Stand about 1 foot from a wall. Place the palms of both hands against the wall at chest level. Lean forward against the wall, keeping one leg with the knee straight and heel on the ground while bending the knee of the other leg.  ? Sit down on the floor or a mat with your feet stretched in front of you. Roll up a towel lengthwise, and loop it over the ball of your foot. Holding the towel at both ends, gently pull the towel toward you to stretch your foot. · Wear shoes with good arch support. Athletic shoes or shoes with a well-cushioned sole are good choices. · Try heel cups or shoe inserts (orthotics) to help cushion your heel. You can buy these at many shoe stores. · Put on your shoes as soon as you get out of bed. Going barefoot or wearing slippers may make your pain worse. · Reach and stay at a good weight for your height.  This puts less strain on your 3 or 4 times a day, 5 days a week. 1. Stand facing a wall with your hands on the wall at about eye level. Put the leg you want to stretch about a step behind your other leg. 2. Keeping your back heel on the floor, bend your front knee until you feel a stretch in the back leg. 3. Hold the stretch for 15 to 30 seconds. Repeat 2 to 4 times. Plantar fascia and calf stretch   Stretching the plantar fascia and calf muscles can increase flexibility and decrease heel pain. You can do this exercise several times each day and before and after activity. 1. Stand on a step as shown above. Be sure to hold on to the banister. 2. Slowly let your heels down over the edge of the step as you relax your calf muscles. You should feel a gentle stretch across the bottom of your foot and up the back of your leg to your knee. 3. Hold the stretch about 15 to 30 seconds, and then tighten your calf muscle a little to bring your heel back up to the level of the step. Repeat 2 to 4 times. Towel curls   Make this exercise more challenging by placing a weighted object, such as a soup can, on the other end of the towel. 1. While sitting, place your foot on a towel on the floor and scrunch the towel toward you with your toes. 2. Then, also using your toes, push the towel away from you. Dell pickups   1. Put marbles on the floor next to a cup.  2. Using your toes, try to lift the marbles up from the floor and put them in the cup. Follow-up care is a key part of your treatment and safety. Be sure to make and go to all appointments, and call your doctor if you are having problems. It's also a good idea to know your test results and keep a list of the medicines you take. Where can you learn more? Go to https://Keemotioncynthiaeb.[employer]. org and sign in to your [employer] account. Enter S803 in the Booktrack box to learn more about \"Plantar Fasciitis: Exercises. \"     If you do not have an account, please click on the \"Sign Up Now\" link. Current as of: March 2, 2020               Content Version: 12.5  © 2006-2020 Healthwise, Incorporated. Care instructions adapted under license by [facility] ([facility] [place]). If you have questions about a medical condition or this instruction, always ask your healthcare professional. [place] [address] any warranty or liability for your use of this information.

## 2020-09-29 NOTE — PROGRESS NOTES
Subjective:      Patient ID: Clemencia Lopez is a 76 y.o. female. HPI: Acute for heel pain    Chief Complaint   Patient presents with    Foot Pain     since 8/2020 started with pain on going worse over past couple of weeks- hard to walk now- taking OTC Tylenol prn       Onset of 1-2 months with heel pain. NKI. Worse after sitting for long periods. Worse in middle night or in morning upon getting up. Pain underside of heel. TTP. No swelling. No wound. Tylenol OTC no benefit. Patient Active Problem List   Diagnosis    Coronary artery disease involving native coronary artery of native heart without angina pectoris    S/P angioplasty with stent-1995 and 1996- in [address] Pure hypercholesterolemia    Tobacco abuse    Cardiomyopathy ([facility] [address] EF 40%    Acute CVA (cerebrovascular accident) ([facility] [address]    Mild intermittent asthma without complication         Review of Systems   Constitutional: Negative. Respiratory: Negative. Cardiovascular: Negative. Musculoskeletal: Positive for arthralgias. Negative for joint swelling. Objective:   Physical Exam  Constitutional:       General: She is not in acute distress. Appearance: She is normal weight. She is not ill-appearing. Cardiovascular:      Rate and Rhythm: Normal rate. Pulses: Normal pulses. Heart sounds: Normal heart sounds. Pulmonary:      Effort: Pulmonary effort is normal.      Breath sounds: Normal breath sounds. Musculoskeletal:        Feet:    Neurological:      Mental Status: She is alert. Assessment:       Diagnosis Orders   1. Plantar fasciitis, right  predniSONE (DELTASONE) 20 MG tablet   2. Post-menopausal     3.  Mild intermittent asthma without complication             Plan:      Prednisone Burst for inflammation  ICE Rolling  Stretches and arch support  Good supporting shoes  RTO if symptoms worsen or stay the same            JANIA Rodriguez - CNP

## 2020-10-08 ENCOUNTER — TELEPHONE (OUTPATIENT)
Dept: FAMILY MEDICINE CLINIC | Age: 65
End: 2020-10-08

## 2020-10-08 NOTE — TELEPHONE ENCOUNTER
The patient called in and stated that she is on vacation in [place] and has completed the prednisone for her right foot pain. She stated that it did not help her at all. The patient stated that the pain is between the heel and arch of her right foot. She stated that now she has right lateral ankle swelling. She is wondering if she needs a XRAY or another medication sent into a pharmacy in [place]. The patient stated that if a script is sent to please send it to:    [facility]   [address] [address] [address] [facility]. [address] [place] [id number]. Please call the patient back.

## 2020-10-14 ENCOUNTER — OFFICE VISIT (OUTPATIENT)
Dept: FAMILY MEDICINE CLINIC | Age: 65
End: 2020-10-14
Payer: MEDICARE

## 2020-10-14 VITALS
SYSTOLIC BLOOD PRESSURE: 114 MMHG | DIASTOLIC BLOOD PRESSURE: 66 MMHG | HEART RATE: 64 BPM | BODY MASS INDEX: 29.46 KG/M2 | WEIGHT: 166.3 LBS | RESPIRATION RATE: 20 BRPM | TEMPERATURE: 97 F

## 2020-10-14 PROCEDURE — G0008 ADMIN INFLUENZA VIRUS VAC: HCPCS | Performed by: NURSE PRACTITIONER

## 2020-10-14 PROCEDURE — 1123F ACP DISCUSS/DSCN MKR DOCD: CPT | Performed by: NURSE PRACTITIONER

## 2020-10-14 PROCEDURE — G8484 FLU IMMUNIZE NO ADMIN: HCPCS | Performed by: NURSE PRACTITIONER

## 2020-10-14 PROCEDURE — 1090F PRES/ABSN URINE INCON ASSESS: CPT | Performed by: NURSE PRACTITIONER

## 2020-10-14 PROCEDURE — G8427 DOCREV CUR MEDS BY ELIG CLIN: HCPCS | Performed by: NURSE PRACTITIONER

## 2020-10-14 PROCEDURE — G8400 PT W/DXA NO RESULTS DOC: HCPCS | Performed by: NURSE PRACTITIONER

## 2020-10-14 PROCEDURE — 4040F PNEUMOC VAC/ADMIN/RCVD: CPT | Performed by: NURSE PRACTITIONER

## 2020-10-14 PROCEDURE — 3017F COLORECTAL CA SCREEN DOC REV: CPT | Performed by: NURSE PRACTITIONER

## 2020-10-14 PROCEDURE — G8417 CALC BMI ABV UP PARAM F/U: HCPCS | Performed by: NURSE PRACTITIONER

## 2020-10-14 PROCEDURE — 4004F PT TOBACCO SCREEN RCVD TLK: CPT | Performed by: NURSE PRACTITIONER

## 2020-10-14 PROCEDURE — 99213 OFFICE O/P EST LOW 20 MIN: CPT | Performed by: NURSE PRACTITIONER

## 2020-10-14 PROCEDURE — 90694 VACC AIIV4 NO PRSRV 0.5ML IM: CPT | Performed by: NURSE PRACTITIONER

## 2020-10-14 ASSESSMENT — ENCOUNTER SYMPTOMS: RESPIRATORY NEGATIVE: 1

## 2020-10-14 NOTE — PROGRESS NOTES
Subjective:      Patient ID: Ileana Peralta is a 76 y.o. female. HPI: Acute for heel pain    Chief Complaint   Patient presents with    Foot Pain     right - ongoing    Flu Vaccine       Onset of 1-2 months with right heel pain. NKI. Worse after sitting for long periods. Worse in middle night or in morning upon getting up. Pain underside of heel. TTP. No swelling. No wound. Seen on 9/29/20 dx with planatar fasciitis. Continue with same symptoms. Short term relief with ice rolling. Tylenol OTC no benefit. Patient Active Problem List   Diagnosis    Coronary artery disease involving native coronary artery of native heart without angina pectoris    S/P angioplasty with stent-1995 and 1996- in [address] Pure hypercholesterolemia    Tobacco abuse    Cardiomyopathy ([facility] [address] EF 40%    Acute CVA (cerebrovascular accident) ([facility] [address]    Mild intermittent asthma without complication       Review of Systems   Constitutional: Negative. Respiratory: Negative. Cardiovascular: Negative. Musculoskeletal: Positive for arthralgias. Negative for joint swelling. Objective:   Physical Exam  Constitutional:       General: She is not in acute distress. Appearance: She is normal weight. She is not ill-appearing. Cardiovascular:      Rate and Rhythm: Normal rate. Pulses: Normal pulses. Heart sounds: Normal heart sounds. Pulmonary:      Effort: Pulmonary effort is normal.      Breath sounds: Normal breath sounds. Musculoskeletal:        Feet:    Neurological:      Mental Status: She is alert. Assessment:       Diagnosis Orders   1. Plantar fasciitis, right     2.  Pain of right heel  XR FOOT RIGHT (MIN 3 VIEWS)   3. Need for influenza vaccination  INFLUENZA, QUADV, ADJUVANTED, 65 YRS =, IM, PF, PREFILL SYR, 0.5ML (FLUAD)           Plan:       XR - rule out spur  Nighttime Arch Splint  Continue stretches and ice rolling  Next step refer to POD  FLU in office  RTO if symptoms worsen or stay the same              Radha Mitchell, APRN - CNP

## 2020-10-14 NOTE — PATIENT INSTRUCTIONS
You may receive a survey regarding the care you received during your visit. Your input is valuable to us. We encourage you to complete and return your survey. We hope you will choose us in the future for your healthcare needs. Tobacco Cessation Programs     Telephonic behavior modification  Wyatt Muller ([phone])   Counseling service for those who are ready to quit using tobacco.     Available for uninsured [place] residents, [place] recipients, pregnant women, or patients whose health plans or employers are members of the [address] [place] [address] behavior modification   http://[place]. Quitlogix. org   Online support program to help patients through each step of the quitting process. Available 24 hours a day 7 days a week. Provides up to date researched based tool, step-by-step guides, and motivational messages. Online behavior modification   www.lungusa.org/stop-smoking/how-to-quit   HelpLine: [facility] (526-7527)   Email questions to:  Lito@Spaseebo. Bravofly    Website offers resources to help tobacco users figure out their reasons for quitting and then take the big step of quitting for good. Hypnosis   Location: [facility] [place] [facility]   Contact: Tanisha Hu, PhD at [phone]   Hypnosis for tobacco cessation   Cost $225 for the initial session and $175 for each session afterwards. Most patients require 6-8 sessions. There is the option to submit through the patients insurance. Hypnosis and behavior modification   Location: [place] [address],  [address] [place] [facility]   Contact: Blue Bright, PhD at [phone]  [facility] Counseling and hypnosis for nicotine addition   Cost: For uninsured patients:  Please call above phone number  Cost for insured patients depends on patients insurance plan.     Behavior modification   Location: [facility], [address] [place], [address]: [place] [address] include four one-on-one appointments between the patient and a respiratory therapist.  The four appointments span over three weeks. The respiratory therapist schedules one of the appointments to occur 48 hours after the patients quit date.  Cost $100 total for the four sessions. Tobacco cessation products are not included in the cost and are not provided by [facility].          Patient Education        Plantar Fasciitis: Care Instructions  Your Care Instructions     Plantar fasciitis is pain and inflammation of the plantar fascia, the tissue at the bottom of your foot that connects the heel bone to the toes. The plantar fascia also supports the arch. If you strain the plantar fascia, it can develop small tears and cause heel pain when you stand or walk. Plantar fasciitis can be caused by running or other sports. It also may occur in people who are overweight or who have high arches or flat feet. You may get plantar fasciitis if you walk or stand for long periods, or have a tight Achilles tendon or calf muscles. You can improve your foot pain with rest and other care at home. It might take a few weeks to a few months for your foot to heal completely. Follow-up care is a key part of your treatment and safety. Be sure to make and go to all appointments, and call your doctor if you are having problems. It's also a good idea to know your test results and keep a list of the medicines you take. How can you care for yourself at home? · Rest your feet often. Reduce your activity to a level that lets you avoid pain. If possible, do not run or walk on hard surfaces. · Take pain medicines exactly as directed. ? If the doctor gave you a prescription medicine for pain, take it as prescribed. ? If you are not taking a prescription pain medicine, take an over-the-counter anti-inflammatory medicine for pain and swelling, such as ibuprofen (Advil, Motrin) or naproxen (Aleve). Read and follow all instructions on the label.   · Use ice massage to help with pain and swelling. You can use an ice cube or an ice cup several times a day. To make an ice cup, fill a paper cup with water and freeze it. Cut off the top of the cup until a half-inch of ice shows. Hold onto the remaining paper to use the cup. Rub the ice in small circles over the area for 5 to 7 minutes. · Contrast baths, which alternate hot and cold water, can also help reduce swelling. But because heat alone may make pain and swelling worse, end a contrast bath with a soak in cold water. · Wear a night splint if your doctor suggests it. A night splint holds your foot with the toes pointed up and the foot and ankle at a 90-degree angle. This position gives the bottom of your foot a constant, gentle stretch. · Do simple exercises such as calf stretches and towel stretches 2 to 3 times each day, especially when you first get up in the morning. These can help the plantar fascia become more flexible. They also make the muscles that support your arch stronger. Hold these stretches for 15 to 30 seconds per stretch. Repeat 2 to 4 times. ? Stand about 1 foot from a wall. Place the palms of both hands against the wall at chest level. Lean forward against the wall, keeping one leg with the knee straight and heel on the ground while bending the knee of the other leg.  ? Sit down on the floor or a mat with your feet stretched in front of you. Roll up a towel lengthwise, and loop it over the ball of your foot. Holding the towel at both ends, gently pull the towel toward you to stretch your foot. · Wear shoes with good arch support. Athletic shoes or shoes with a well-cushioned sole are good choices. · Try heel cups or shoe inserts (orthotics) to help cushion your heel. You can buy these at many shoe stores. · Put on your shoes as soon as you get out of bed. Going barefoot or wearing slippers may make your pain worse. · Reach and stay at a good weight for your height.  This puts less strain on your feet.  When should you call for help? Call your doctor now or seek immediate medical care if:    · You have heel pain with fever, redness, or warmth in your heel.     · You cannot put weight on the sore foot. Watch closely for changes in your health, and be sure to contact your doctor if:    · You have numbness or tingling in your heel.     · Your heel pain lasts more than 2 weeks. Where can you learn more? Go to https://AppCardpeiiko.Mocana. org and sign in to your [employer] account. Enter P119 in the [facility] box to learn more about \"Plantar Fasciitis: Care Instructions. \"     If you do not have an account, please click on the \"Sign Up Now\" link. Current as of: March 2, 2020               Content Version: 12.6  © 2006-2020 [employer]. Care instructions adapted under license by [facility] ([facility]). If you have questions about a medical condition or this instruction, always ask your healthcare professional. [place] [address] any warranty or liability for your use of this information. Patient Education        Plantar Fasciitis: Exercises  Introduction  Here are some examples of exercises for you to try. The exercises may be suggested for a condition or for rehabilitation. Start each exercise slowly. Ease off the exercises if you start to have pain. You will be told when to start these exercises and which ones will work best for you. How to do the exercises  Towel stretch   1. Sit with your legs extended and knees straight. 2. Place a towel around your foot just under the toes. 3. Hold each end of the towel in each hand, with your hands above your knees. 4. Pull back with the towel so that your foot stretches toward you. 5. Hold the position for at least 15 to 30 seconds. 6. Repeat 2 to 4 times a session, up to 5 sessions a day. Calf stretch   This exercise stretches the muscles at the back of the lower leg (the calf) and the Achilles tendon.  Do this exercise 3 or 4 times a day, 5 days a week. 1. Stand facing a wall with your hands on the wall at about eye level. Put the leg you want to stretch about a step behind your other leg. 2. Keeping your back heel on the floor, bend your front knee until you feel a stretch in the back leg. 3. Hold the stretch for 15 to 30 seconds. Repeat 2 to 4 times. Plantar fascia and calf stretch   Stretching the plantar fascia and calf muscles can increase flexibility and decrease heel pain. You can do this exercise several times each day and before and after activity. 1. Stand on a step as shown above. Be sure to hold on to the banister. 2. Slowly let your heels down over the edge of the step as you relax your calf muscles. You should feel a gentle stretch across the bottom of your foot and up the back of your leg to your knee. 3. Hold the stretch about 15 to 30 seconds, and then tighten your calf muscle a little to bring your heel back up to the level of the step. Repeat 2 to 4 times. Towel curls   Make this exercise more challenging by placing a weighted object, such as a soup can, on the other end of the towel. 1. While sitting, place your foot on a towel on the floor and scrunch the towel toward you with your toes. 2. Then, also using your toes, push the towel away from you. [place] pickups   1. Put marbles on the floor next to a cup.  2. Using your toes, try to lift the marbles up from the floor and put them in the cup. Follow-up care is a key part of your treatment and safety. Be sure to make and go to all appointments, and call your doctor if you are having problems. It's also a good idea to know your test results and keep a list of the medicines you take. Where can you learn more? Go to https://ddmap.comcynthiaeb.Resermap. org and sign in to your [employer] account. Enter C714 in the Plovgh box to learn more about \"Plantar Fasciitis: Exercises. \"     If you do not have an account, please click on the \"Sign Up Now\" link. Current as of: March 2, 2020               Content Version: 12.6  © 2006-2020 [employer]orporated. Care instructions adapted under license by [facility] ([facility]). If you have questions about a medical condition or this instruction, always ask your healthcare professional. [address] any warranty or liability for your use of this information.

## 2020-10-14 NOTE — PROGRESS NOTES
Visit Information    Have you changed or started any medications since your last visit including any over-the-counter medicines, vitamins, or herbal medicines? no   Are you having any side effects from any of your medications? -  no  Have you stopped taking any of your medications? Is so, why? -  yes - see updated med list    Have you seen any other physician or provider since your last visit? No  Have you had any other diagnostic tests since your last visit? No  Have you been seen in the emergency room and/or had an admission to a hospital since we last saw you? No  Have you had your routine dental cleaning in the past 6 months? no    Have you activated your [employer] account? If not, what are your barriers?  Yes     Patient Care Team:  JANIA Carlson CNP as PCP - General (Certified Nurse Practitioner)  JANIA Carlson CNP as PCP - [facility]  Lisbet Aden MD as Consulting Physician (Cardiology)    Medical History Review  Past Medical, Family, and Social History reviewed and does not contribute to the patient presenting condition    Health Maintenance   Topic Date Due    Hepatitis C screen  1955    Colon cancer screen colonoscopy  08/15/2004    DEXA (modify frequency per FRAX score)  08/15/2009    Annual Wellness Visit (AWV)  05/24/2020    Flu vaccine (1) 09/01/2020    Breast cancer screen  11/16/2020    Lipid screen  01/15/2021    A1C test (Diabetic or Prediabetic)  07/08/2021    DTaP/Tdap/Td vaccine (3 - Td) 11/11/2027    Shingles Vaccine  Completed    Pneumococcal 65+ years Vaccine  Completed    Hepatitis A vaccine  Aged Out    Hepatitis B vaccine  Aged Out    Hib vaccine  Aged Out    Meningococcal (ACWY) vaccine  Aged Out         Immunizations Administered     Name Date Dose Route    Influenza, Quadv, adjuvanted, 65 yrs +, IM, PF (Fluad) 10/14/2020 0.5 mL Intramuscular    Site: Deltoid- Left    Lot: [id number]    NDC: 50981-380-08          Per orders of Magalis Fontaine CNP, injection of FluAD 0.5ml given IM in Left deltoid by Lis Dietrich. Patient instructed to report any adverse reaction to me immediately. Patient tolerated well and without incident. VIS given to the patient.

## 2021-01-15 ENCOUNTER — OFFICE VISIT (OUTPATIENT)
Dept: FAMILY MEDICINE CLINIC | Age: 66
End: 2021-01-15
Payer: MEDICARE

## 2021-01-15 ENCOUNTER — HOSPITAL ENCOUNTER (OUTPATIENT)
Age: 66
Discharge: HOME | End: 2021-01-15
Payer: MEDICARE

## 2021-01-15 ENCOUNTER — HOSPITAL ENCOUNTER (OUTPATIENT)
Dept: GENERAL RADIOLOGY | Age: 66
Discharge: HOME | End: 2021-01-15
Payer: MEDICARE

## 2021-01-15 VITALS
WEIGHT: 174.4 LBS | BODY MASS INDEX: 30.89 KG/M2 | DIASTOLIC BLOOD PRESSURE: 70 MMHG | TEMPERATURE: 96.8 F | SYSTOLIC BLOOD PRESSURE: 108 MMHG | RESPIRATION RATE: 20 BRPM | HEART RATE: 60 BPM

## 2021-01-15 DIAGNOSIS — M79.671 PAIN OF RIGHT HEEL: ICD-10-CM

## 2021-01-15 DIAGNOSIS — M79.671 PAIN OF MIDFOOT, RIGHT: Primary | ICD-10-CM

## 2021-01-15 PROCEDURE — 1090F PRES/ABSN URINE INCON ASSESS: CPT | Performed by: NURSE PRACTITIONER

## 2021-01-15 PROCEDURE — 73630 X-RAY EXAM OF FOOT: CPT

## 2021-01-15 PROCEDURE — G8400 PT W/DXA NO RESULTS DOC: HCPCS | Performed by: NURSE PRACTITIONER

## 2021-01-15 PROCEDURE — G8417 CALC BMI ABV UP PARAM F/U: HCPCS | Performed by: NURSE PRACTITIONER

## 2021-01-15 PROCEDURE — G8427 DOCREV CUR MEDS BY ELIG CLIN: HCPCS | Performed by: NURSE PRACTITIONER

## 2021-01-15 PROCEDURE — 3017F COLORECTAL CA SCREEN DOC REV: CPT | Performed by: NURSE PRACTITIONER

## 2021-01-15 PROCEDURE — 1123F ACP DISCUSS/DSCN MKR DOCD: CPT | Performed by: NURSE PRACTITIONER

## 2021-01-15 PROCEDURE — 99213 OFFICE O/P EST LOW 20 MIN: CPT | Performed by: NURSE PRACTITIONER

## 2021-01-15 PROCEDURE — 4040F PNEUMOC VAC/ADMIN/RCVD: CPT | Performed by: NURSE PRACTITIONER

## 2021-01-15 PROCEDURE — G8484 FLU IMMUNIZE NO ADMIN: HCPCS | Performed by: NURSE PRACTITIONER

## 2021-01-15 PROCEDURE — 4004F PT TOBACCO SCREEN RCVD TLK: CPT | Performed by: NURSE PRACTITIONER

## 2021-01-15 ASSESSMENT — PATIENT HEALTH QUESTIONNAIRE - PHQ9
SUM OF ALL RESPONSES TO PHQ QUESTIONS 1-9: 0
SUM OF ALL RESPONSES TO PHQ QUESTIONS 1-9: 0
1. LITTLE INTEREST OR PLEASURE IN DOING THINGS: 0
2. FEELING DOWN, DEPRESSED OR HOPELESS: 0
SUM OF ALL RESPONSES TO PHQ QUESTIONS 1-9: 0
SUM OF ALL RESPONSES TO PHQ9 QUESTIONS 1 & 2: 0

## 2021-01-15 ASSESSMENT — ENCOUNTER SYMPTOMS: RESPIRATORY NEGATIVE: 1

## 2021-01-15 NOTE — PROGRESS NOTES
Visit Information    Have you changed or started any medications since your last visit including any over-the-counter medicines, vitamins, or herbal medicines? no   Are you having any side effects from any of your medications? -  no  Have you stopped taking any of your medications? Is so, why? -  no    Have you seen any other physician or provider since your last visit? No  Have you had any other diagnostic tests since your last visit? No  Have you been seen in the emergency room and/or had an admission to a hospital since we last saw you? No  Have you had your routine dental cleaning in the past 6 months? Yes, 10/2020    Have you activated your [employer]io account? If not, what are your barriers? Yes     Patient Care Team:  JANIA Esquivel CNP as PCP - General (Certified Nurse Practitioner)  JANIA Esquivel CNP as PCP - [facility] Provider  Shavonne Sharma MD as Consulting Physician (Cardiology)    Medical History Review  Past Medical, Family, and Social History reviewed and does not contribute to the patient presenting condition    Health Maintenance   Topic Date Due    Hepatitis C screen  1955    Colon cancer screen colonoscopy  08/15/2004    DEXA (modify frequency per FRAX score)  08/15/2009    Annual Wellness Visit (AWV)  05/24/2020    Breast cancer screen  11/16/2020    Lipid screen  01/15/2021    A1C test (Diabetic or Prediabetic)  07/08/2021    DTaP/Tdap/Td vaccine (3 - Td) 11/11/2027    Flu vaccine  Completed    Shingles Vaccine  Completed    Pneumococcal 65+ years Vaccine  Completed    Hepatitis A vaccine  Aged Out    Hepatitis B vaccine  Aged Out    Hib vaccine  Aged Out    Meningococcal (ACWY) vaccine  Aged Out       Referral faxed to Dr. Vero Nieto [phone], they will contact pt to schedule.

## 2021-01-15 NOTE — PROGRESS NOTES
Subjective:      Patient ID: Angel Carrizales is a 76 y.o. female. HPI: Acute for heel pain    Chief Complaint   Patient presents with    Foot Pain     ongoing right foot pain since 9/2020       Onset of 3-4 months with right heel pain. NKI. Worse after sitting for long periods. Worse in middle night or in morning upon getting up. Pain underside of heel. TTP. No swelling. No wound. No benefit with ICE ROLLING,  Prednisone Burst, Shoe inserts and Nightime Heel Strap. Last 2-3 days pain on heel resolved and having pain on top of food. Pain with walking. Swelling. TTP. On Plavix and Eliquis. Patient Active Problem List   Diagnosis    Coronary artery disease involving native coronary artery of native heart without angina pectoris    S/P angioplasty with stent-1995 and 1996- in [address] Pure hypercholesterolemia    Tobacco abuse    Cardiomyopathy ([facility] [address] EF 40%    Acute CVA (cerebrovascular accident) ([facility] [address]    Mild intermittent asthma without complication       Review of Systems   Constitutional: Negative. Respiratory: Negative. Cardiovascular: Negative. Musculoskeletal: Positive for arthralgias. Negative for joint swelling. Objective:   Physical Exam  Constitutional:       General: She is not in acute distress. Appearance: She is normal weight. She is not ill-appearing. Cardiovascular:      Rate and Rhythm: Normal rate. Pulses: Normal pulses. Heart sounds: Normal heart sounds. Pulmonary:      Effort: Pulmonary effort is normal.      Breath sounds: Normal breath sounds. Musculoskeletal:      Right foot: Normal range of motion. Left foot: Normal range of motion. Feet:    Feet:      Right foot:      Skin integrity: Skin integrity normal.      Left foot:      Skin integrity: Skin integrity normal.   Neurological:      Mental Status: She is alert. Assessment:       Diagnosis Orders   1.  Pain of midfoot, right External Referral To Podiatry    diclofenac sodium (VOLTAREN) 1 % GEL   2.  Pain of right heel  External Referral To Podiatry           Plan:       XR - order in system will complete  Refer to POD  Voltaren GEL  RTO if symptoms worsen or stay the same              Anastacio Bassett, APRN - CNP

## 2021-04-12 DIAGNOSIS — G47.9 SLEEP DISORDER: ICD-10-CM

## 2021-04-12 RX ORDER — ROPINIROLE 3 MG/1
TABLET, FILM COATED ORAL
Qty: 90 TABLET | Refills: 3 | Status: SHIPPED | OUTPATIENT
Start: 2021-04-12 | End: 2021-04-12

## 2021-04-12 RX ORDER — ROPINIROLE 3 MG/1
TABLET, FILM COATED ORAL
Qty: 90 TABLET | Refills: 3 | Status: SHIPPED | OUTPATIENT
Start: 2021-04-12 | End: 2022-05-23 | Stop reason: SDUPTHER

## 2021-04-12 NOTE — TELEPHONE ENCOUNTER
Ludmila Ayala called requesting a refill on the following medications:  Requested Prescriptions     Pending Prescriptions Disp Refills    rOPINIRole (REQUIP) 3 MG tablet 90 tablet 3     Sig: TAKE 1 TABLET BY MOUTH EVERY DAY AT NIGHT     Pharmacy verified:  [email or web address]      Date of last visit: 1/15/21  Date of next visit (if applicable): Visit date not found

## 2021-04-14 ENCOUNTER — IMMUNIZATION (OUTPATIENT)
Dept: PRIMARY CARE CLINIC | Age: 66
End: 2021-04-14
Payer: MEDICARE

## 2021-04-14 PROCEDURE — 0001A COVID-19, PFIZER VACCINE 30MCG/0.3ML DOSE: CPT | Performed by: FAMILY MEDICINE

## 2021-04-14 PROCEDURE — 91300 COVID-19, PFIZER VACCINE 30MCG/0.3ML DOSE: CPT | Performed by: FAMILY MEDICINE

## 2021-05-04 ENCOUNTER — IMMUNIZATION (OUTPATIENT)
Dept: PRIMARY CARE CLINIC | Age: 66
End: 2021-05-04
Payer: MEDICARE

## 2021-05-04 PROCEDURE — 0002A COVID-19, PFIZER VACCINE 30MCG/0.3ML DOSE: CPT | Performed by: FAMILY MEDICINE

## 2021-05-04 PROCEDURE — 91300 COVID-19, PFIZER VACCINE 30MCG/0.3ML DOSE: CPT | Performed by: FAMILY MEDICINE

## 2021-07-15 NOTE — TELEPHONE ENCOUNTER
Requested Prescriptions     Pending Prescriptions Disp Refills    metoprolol tartrate (LOPRESSOR) 25 MG tablet 60 tablet      Sig: TAKE 1 TABLET BY MOUTH TWICE A DAY       If no call back pt aware that script was sent to [facility].

## 2021-08-19 ENCOUNTER — TELEPHONE (OUTPATIENT)
Dept: FAMILY MEDICINE CLINIC | Age: 66
End: 2021-08-19

## 2021-08-19 NOTE — TELEPHONE ENCOUNTER
Spoke with pt and appt scheduled.
you currently have flu-like symptoms including fever or chills, cough,   shortness of breath, difficulty breathing, or new loss of taste or smell? No  Have you had close contact with someone with COVID-19 in the last 14 days? No  (Service Expert  click yes below to proceed with [employer] As Usual   Scheduling)?  Yes

## 2021-08-19 NOTE — TELEPHONE ENCOUNTER
Received a call from [facility]. with [address] stating that the patient had a CVA and had been rehabbing there at [facility] and will be discharged tomorrow 8/20/21 with [facility]. She stated that she will be getting nursing, PT, OT and SL from [address] in [place]. Kinjal Murrieta is asking if you will follow for [facility]. Please advise.       Kinjal Murrieta [phone] ext: [phone]

## 2021-08-31 ENCOUNTER — OFFICE VISIT (OUTPATIENT)
Dept: FAMILY MEDICINE CLINIC | Age: 66
End: 2021-08-31
Payer: MEDICARE

## 2021-08-31 VITALS
SYSTOLIC BLOOD PRESSURE: 100 MMHG | RESPIRATION RATE: 16 BRPM | HEART RATE: 64 BPM | DIASTOLIC BLOOD PRESSURE: 58 MMHG | WEIGHT: 170.7 LBS | BODY MASS INDEX: 30.25 KG/M2 | HEIGHT: 63 IN

## 2021-08-31 DIAGNOSIS — I63.9 ACUTE CVA (CEREBROVASCULAR ACCIDENT) (HCC): Primary | ICD-10-CM

## 2021-08-31 DIAGNOSIS — G25.81 RLS (RESTLESS LEGS SYNDROME): ICD-10-CM

## 2021-08-31 DIAGNOSIS — R53.1 RIGHT SIDED WEAKNESS: ICD-10-CM

## 2021-08-31 DIAGNOSIS — F33.42 RECURRENT MAJOR DEPRESSIVE DISORDER, IN FULL REMISSION (HCC): ICD-10-CM

## 2021-08-31 DIAGNOSIS — I42.0 DILATED CARDIOMYOPATHY (HCC): ICD-10-CM

## 2021-08-31 DIAGNOSIS — I48.0 PAROXYSMAL ATRIAL FIBRILLATION (HCC): ICD-10-CM

## 2021-08-31 DIAGNOSIS — I25.10 CORONARY ARTERY DISEASE INVOLVING NATIVE HEART WITHOUT ANGINA PECTORIS, UNSPECIFIED VESSEL OR LESION TYPE: ICD-10-CM

## 2021-08-31 DIAGNOSIS — Z72.0 TOBACCO ABUSE: ICD-10-CM

## 2021-08-31 PROCEDURE — G8400 PT W/DXA NO RESULTS DOC: HCPCS | Performed by: NURSE PRACTITIONER

## 2021-08-31 PROCEDURE — 4004F PT TOBACCO SCREEN RCVD TLK: CPT | Performed by: NURSE PRACTITIONER

## 2021-08-31 PROCEDURE — G8417 CALC BMI ABV UP PARAM F/U: HCPCS | Performed by: NURSE PRACTITIONER

## 2021-08-31 PROCEDURE — 3017F COLORECTAL CA SCREEN DOC REV: CPT | Performed by: NURSE PRACTITIONER

## 2021-08-31 PROCEDURE — 99215 OFFICE O/P EST HI 40 MIN: CPT | Performed by: NURSE PRACTITIONER

## 2021-08-31 PROCEDURE — G8427 DOCREV CUR MEDS BY ELIG CLIN: HCPCS | Performed by: NURSE PRACTITIONER

## 2021-08-31 PROCEDURE — 1123F ACP DISCUSS/DSCN MKR DOCD: CPT | Performed by: NURSE PRACTITIONER

## 2021-08-31 PROCEDURE — 4040F PNEUMOC VAC/ADMIN/RCVD: CPT | Performed by: NURSE PRACTITIONER

## 2021-08-31 PROCEDURE — 1090F PRES/ABSN URINE INCON ASSESS: CPT | Performed by: NURSE PRACTITIONER

## 2021-08-31 RX ORDER — RANOLAZINE 500 MG/1
1 TABLET, EXTENDED RELEASE ORAL 2 TIMES DAILY
COMMUNITY
Start: 2021-07-27

## 2021-08-31 RX ORDER — ATORVASTATIN CALCIUM 80 MG/1
80 TABLET, FILM COATED ORAL DAILY
Status: SHIPPED | COMMUNITY
Start: 2021-08-31

## 2021-08-31 RX ORDER — LANOLIN ALCOHOL/MO/W.PET/CERES
1 CREAM (GRAM) TOPICAL NIGHTLY PRN
COMMUNITY
Start: 2021-08-20

## 2021-08-31 RX ORDER — AMIODARONE HYDROCHLORIDE 200 MG/1
1 TABLET ORAL DAILY
COMMUNITY
Start: 2021-02-12

## 2021-08-31 RX ORDER — NITROGLYCERIN 0.4 MG/1
1 TABLET SUBLINGUAL PRN
COMMUNITY
Start: 2021-02-10

## 2021-08-31 RX ORDER — ISOSORBIDE MONONITRATE 30 MG/1
1 TABLET, EXTENDED RELEASE ORAL DAILY
COMMUNITY
Start: 2021-02-10

## 2021-08-31 ASSESSMENT — ENCOUNTER SYMPTOMS
ABDOMINAL PAIN: 0
NAUSEA: 0
SHORTNESS OF BREATH: 0
COUGH: 0

## 2021-08-31 NOTE — PATIENT INSTRUCTIONS
You may receive a survey regarding the care you received during your visit. Your input is valuable to us. We encourage you to complete and return your survey. We hope you will choose us in the future for your healthcare needs. Tobacco Cessation Programs     Telephonic behavior modification  Fela Hilario ([phone])   Counseling service for those who are ready to quit using tobacco.     Available for uninsured [place] residents, [place] recipients, pregnant women, or patients whose health plans or employers are members of the [address] [place] [address] behavior modification   http://[place]. Quitlogix. org   Online support program to help patients through each step of the quitting process. Available 24 hours a day 7 days a week. Provides up to date researched based tool, step-by-step guides, and motivational messages. Online behavior modification   www.lungusa.org/stop-smoking/how-to-quit   HelpLine: [facility] (883-2464)   Email questions to:  Anrde@[email or web address]. Notice Kiosk    Website offers resources to help tobacco users figure out their reasons for quitting and then take the big step of quitting for good. Hypnosis   Location: [facility], [facility] [place]   Contact: Gerard Adams, PhD at [phone]   Hypnosis for tobacco cessation   Cost $225 for the initial session and $175 for each session afterwards. Most patients require 6-8 sessions. There is the option to submit through the patients insurance. Hypnosis and behavior modification   Location: [facility],  [address] [place] [address], [place]   Contact: Pa Tolentino, PhD at [phone]  [facility] Counseling and hypnosis for nicotine addition   Cost: For uninsured patients:  Please call above phone number  Cost for insured patients depends on patients insurance plan.     Behavior modification   Location: [address], [address] [place], [address]: [place] [address] include four one-on-one appointments between the patient and a respiratory therapist.  The four appointments span over three weeks. The respiratory therapist schedules one of the appointments to occur 48 hours after the patients quit date.  Cost $100 total for the four sessions.   Tobacco cessation products are not included in the cost and are not provided by [facility].     Vielka Morin

## 2021-08-31 NOTE — PROGRESS NOTES
Subjective:      Patient ID: Norma Gray [birth year] is a 78 y.o. female here for evaluation. Chief Complaint   Patient presents with    Follow-Up from Hospital     D/C from [facility] 8/21/21 - CVA - calling [facility] for records   [facility] Maintenance     needs a colonoscopy and a mammogram       Was admitted to [facility] on 8/10/21 for acute CVA. Slurred speech and right side weakness. Went to [facility]. Discharged on 8/21/21. She is getting [address], PT, OT and SL from [address] in [place] with right side weakness and slurred speech. A&O x 3. Slow responce. Improving. Vitals:    08/31/21 1359   BP: (!) 100/58   Pulse: 64   Resp: 16     Denied lightheadedness. Charis Villasenor - 8/31/21  NEURO Dr. Moises Sanders - 10/7/21   REHAB Torie Garcia - 10/18/21     REHAB Evaluation - 9/8/21    Denies CP, SOB or chest tightness. AFIB. On Eliquis and Amiodorone. On STATIN. Still smoking 1/2 ppd.   Denied quitting    Patient Active Problem List   Diagnosis    Coronary artery disease involving native coronary artery of native heart without angina pectoris    S/P angioplasty with stent-1995 and 1996- in [address] Pure hypercholesterolemia    Tobacco abuse    Cardiomyopathy ([address] EF 40%    Acute CVA (cerebrovascular accident) ([address]    Mild intermittent asthma without complication    Recurrent major depressive disorder, in full remission ([address]    Paroxysmal atrial fibrillation ([address]     COLONOSCOPY - denied  MAMMO - denied  DEXA - denied  CT LUNG - denied      BP Readings from Last 3 Encounters:   08/31/21 (!) 100/58   01/15/21 108/70   10/14/20 114/66       Labs reviewed    Lab Results   Component Value Date    LABA1C 5.7 07/08/2020    LABA1C 5.3 09/30/2016     Lab Results   Component Value Date     07/08/2020       No components found for: CHLPL  Lab Results   Component Value Date    TRIG 150 02/05/2021    TRIG 102 01/15/2020    TRIG 195 10/22/2018     Lab Results   Component Value Date    HDL 52 02/05/2021    HDL 45 01/15/2020    HDL 43 10/22/2018     Lab Results   Component Value Date    LDLCALC 77 01/15/2020    LDLCALC 83 10/22/2018    LDLCALC 102 09/30/2016     No results found for: LABVLDL      Chemistry        Component Value Date/Time     08/10/2021 1411    K 4.1 08/10/2021 1411     08/10/2021 1411    CO2 25 08/10/2021 1411    BUN 14 08/10/2021 1411    CREATININE 0.85 08/10/2021 1411        Component Value Date/Time    CALCIUM 8.60 (L) 08/10/2021 1411    ALKPHOS 108 08/10/2021 1411    AST 16 08/10/2021 1411    ALT 19 08/10/2021 1411    BILITOT 0.3 08/10/2021 1411            Lab Results   Component Value Date    TSH 2.880 09/30/2016       Lab Results   Component Value Date    WBC 8.8 08/10/2021    HGB 13.9 08/10/2021    HCT 42.7 08/10/2021    MCV 91.0 08/10/2021     08/10/2021         Health Maintenance   Topic Date Due    Hepatitis C screen  Never done    Colon cancer screen colonoscopy  Never done    Low dose CT lung screening  Never done    DEXA (modify frequency per FRAX score)  Never done   [employer] Visit (AWV)  Never done    Breast cancer screen  11/16/2020    A1C test (Diabetic or Prediabetic)  07/08/2021    Flu vaccine (1) 09/01/2021    Lipid screen  02/05/2022    TSH testing  02/05/2022    DTaP/Tdap/Td vaccine (3 - Td or Tdap) 11/11/2027    Shingles Vaccine  Completed    Pneumococcal 65+ years Vaccine  Completed    COVID-19 Vaccine  Completed    Hepatitis A vaccine  Aged Out    Hepatitis B vaccine  Aged Out    Hib vaccine  Aged Out    Meningococcal (ACWY) vaccine  Aged [employer] History   Administered Date(s) Administered    COVID-19, Pfizer, PF, 30mcg/0.3mL 04/14/2021, 05/04/2021    Influenza Virus Vaccine 02/18/2020    Influenza, Trang Half, IM, (6 mo and older Fluzone, Flulaval, Fluarix and 3 yrs and older Afluria) 10/05/2017, 10/08/2018    Influenza, Quadv, adjuvanted, 65 yrs +, IM, PF (Fluad) 10/14/2020    Pneumococcal Polysaccharide (Inofxyabn69) 10/05/2017, 02/18/2020    Td, (Adult) Not Adsorbed 11/11/2017    Tdap (Boostrix, Adacel) 10/05/2017    Zoster Live (Zostavax) 10/05/2017    Zoster Recombinant (Shingrix) 10/08/2018, 02/04/2019       Review of Systems   Constitutional: Negative for chills and fever. HENT: Negative. Respiratory: Negative for cough and shortness of breath. Cardiovascular: Negative for chest pain. Gastrointestinal: Negative for abdominal pain and nausea. Skin: Negative for rash. Neurological: Positive for speech difficulty and weakness. Negative for dizziness, light-headedness and headaches. Psychiatric/Behavioral: Positive for decreased concentration. Negative for confusion. Objective:   Physical Exam  Constitutional:       General: She is not in acute distress. Eyes:      Pupils: Pupils are equal, round, and reactive to light. Cardiovascular:      Rate and Rhythm: Normal rate and regular rhythm. Heart sounds: No murmur heard. Pulmonary:      Effort: Pulmonary effort is normal.      Breath sounds: Normal breath sounds. No wheezing. Abdominal:      General: Bowel sounds are normal. There is no distension. Palpations: Abdomen is soft. Tenderness: There is no abdominal tenderness. Musculoskeletal:         General: No tenderness. Normal range of motion. Cervical back: Normal range of motion and neck supple. Skin:     General: Skin is warm and dry. Findings: No rash. Psychiatric:         Attention and Perception: Attention normal.         Mood and Affect: Mood normal.         Speech: Speech is delayed. Behavior: Behavior normal. Behavior is cooperative. Thought Content: Thought content normal.         Cognition and Memory: Cognition normal.         Judgment: Judgment normal.          Assessment:       Diagnosis Orders   1. Acute CVA (cerebrovascular accident) ([facility]     2. Right sided weakness     3. Paroxysmal atrial fibrillation (HCC)     4. Dilated cardiomyopathy ([facility]     5. Coronary artery disease involving native heart without angina pectoris, unspecified vessel or lesion type     6. Recurrent major depressive disorder, in full remission ([facility]     7. RLS (restless legs syndrome)     8.  Tobacco abuse             Plan:      ED Labs, CTA Head and Neck Reviewed  [facility] Correspondence  Follow up with Specialists  [facility] Therapy Following  Chronic conditions stable  CRS options discussed - denied  MAMMO, DEXA and CT LUNG Screen  Immunizations discussed - denied  RTO in 3 months            Current Outpatient Medications   Medication Sig Dispense Refill    amiodarone (CORDARONE) 200 MG tablet Take 1 tablet by mouth daily      isosorbide mononitrate (IMDUR) 30 MG extended release tablet Take 1 tablet by mouth daily      melatonin 3 MG TABS tablet Take 1 tablet by mouth nightly as needed      nitroGLYCERIN (NITROSTAT) 0.4 MG SL tablet Place 1 tablet under the tongue as needed      ranolazine (RANEXA) 500 MG extended release tablet Take 1 tablet by mouth 2 times daily      ticagrelor (BRILINTA) 90 MG TABS tablet Take 1 tablet by mouth 2 times daily      atorvastatin (LIPITOR) 80 MG tablet Take 1 tablet by mouth daily      metoprolol tartrate (LOPRESSOR) 25 MG tablet TAKE 1 TABLET BY MOUTH TWICE A  tablet 3    rOPINIRole (REQUIP) 3 MG tablet TAKE ONE TABLET BY MOUTH EVERY DAY AT NIGHT 90 tablet 3    sertraline (ZOLOFT) 50 MG tablet TAKE ONE TABLET BY MOUTH EVERY DAY 90 tablet 2    pramipexole (MIRAPEX) 0.5 MG tablet TAKE ONE TABLET BY MOUTH TWICE A  tablet 3    clopidogrel (PLAVIX) 75 MG tablet TAKE 1 TABLET BY MOUTH EVERY DAY 90 tablet 3    Multiple Vitamins-Minerals (MULTI-VITAMIN GUMMIES PO) Take by mouth      apixaban (ELIQUIS) 5 MG TABS tablet TAKE 1 TABLET BY MOUTH TWICE A DAY 60 tablet 11    aspirin 81 MG tablet Take 1 tablet by mouth daily 90 tablet 3    diclofenac sodium (VOLTAREN) 1 % GEL Apply 2 g topically 2 times daily 350 g 1    Elastic Bandages & Supports (PLANTAR FASCIITIS SUPPORT) MISC Right Foot Night Brace (Patient not taking: Reported on 8/31/2021) 1 each 0     No current facility-administered medications for this visit.

## 2021-09-08 ENCOUNTER — TELEPHONE (OUTPATIENT)
Dept: FAMILY MEDICINE CLINIC | Age: 66
End: 2021-09-08

## 2021-09-08 NOTE — TELEPHONE ENCOUNTER
Hard to tell without exam whether ear wax or fluid behind ear. Ears popping or crackling? Nasal congestion?

## 2021-09-08 NOTE — TELEPHONE ENCOUNTER
----- Message from Artemio Ribera sent at 9/8/2021 10:38 AM EDT -----  Subject: Message to Provider    QUESTIONS  Information for Provider? patient called with a complaint of her right   ear, no pain. She states that it feels like there is water in her ear,   like it is clogged up. She is asking if there is any medicine that she can   have prescribed. Please contact patient and advise  ---------------------------------------------------------------------------  --------------  CALL BACK INFO  What is the best way for the office to contact you? OK to leave message on   voicemail  Preferred Call Back Phone Number? [phone]  ---------------------------------------------------------------------------  --------------  SCRIPT ANSWERS  Relationship to Patient?  Self

## 2021-09-09 ENCOUNTER — OFFICE VISIT (OUTPATIENT)
Dept: FAMILY MEDICINE CLINIC | Age: 66
End: 2021-09-09
Payer: MEDICARE

## 2021-09-09 VITALS
RESPIRATION RATE: 24 BRPM | HEART RATE: 60 BPM | WEIGHT: 170.8 LBS | DIASTOLIC BLOOD PRESSURE: 54 MMHG | BODY MASS INDEX: 30.26 KG/M2 | SYSTOLIC BLOOD PRESSURE: 104 MMHG

## 2021-09-09 DIAGNOSIS — I48.0 PAROXYSMAL ATRIAL FIBRILLATION (HCC): ICD-10-CM

## 2021-09-09 DIAGNOSIS — H69.81 DYSFUNCTION OF RIGHT EUSTACHIAN TUBE: Primary | ICD-10-CM

## 2021-09-09 DIAGNOSIS — I25.10 CORONARY ARTERY DISEASE INVOLVING NATIVE CORONARY ARTERY OF NATIVE HEART WITHOUT ANGINA PECTORIS: ICD-10-CM

## 2021-09-09 DIAGNOSIS — Z98.61 S/P CORONARY ANGIOPLASTY: ICD-10-CM

## 2021-09-09 DIAGNOSIS — I42.0 DILATED CARDIOMYOPATHY (HCC): ICD-10-CM

## 2021-09-09 DIAGNOSIS — I63.9 ACUTE CVA (CEREBROVASCULAR ACCIDENT) (HCC): ICD-10-CM

## 2021-09-09 DIAGNOSIS — G25.81 RLS (RESTLESS LEGS SYNDROME): ICD-10-CM

## 2021-09-09 DIAGNOSIS — F33.42 RECURRENT MAJOR DEPRESSIVE DISORDER, IN FULL REMISSION (HCC): ICD-10-CM

## 2021-09-09 PROCEDURE — 3017F COLORECTAL CA SCREEN DOC REV: CPT | Performed by: NURSE PRACTITIONER

## 2021-09-09 PROCEDURE — G8400 PT W/DXA NO RESULTS DOC: HCPCS | Performed by: NURSE PRACTITIONER

## 2021-09-09 PROCEDURE — G8417 CALC BMI ABV UP PARAM F/U: HCPCS | Performed by: NURSE PRACTITIONER

## 2021-09-09 PROCEDURE — 1123F ACP DISCUSS/DSCN MKR DOCD: CPT | Performed by: NURSE PRACTITIONER

## 2021-09-09 PROCEDURE — G8427 DOCREV CUR MEDS BY ELIG CLIN: HCPCS | Performed by: NURSE PRACTITIONER

## 2021-09-09 PROCEDURE — 99214 OFFICE O/P EST MOD 30 MIN: CPT | Performed by: NURSE PRACTITIONER

## 2021-09-09 PROCEDURE — 4040F PNEUMOC VAC/ADMIN/RCVD: CPT | Performed by: NURSE PRACTITIONER

## 2021-09-09 PROCEDURE — 1090F PRES/ABSN URINE INCON ASSESS: CPT | Performed by: NURSE PRACTITIONER

## 2021-09-09 PROCEDURE — 4004F PT TOBACCO SCREEN RCVD TLK: CPT | Performed by: NURSE PRACTITIONER

## 2021-09-09 RX ORDER — CLOPIDOGREL BISULFATE 75 MG/1
TABLET ORAL
Qty: 90 TABLET | Refills: 3 | Status: SHIPPED | OUTPATIENT
Start: 2021-09-09

## 2021-09-09 RX ORDER — PRAMIPEXOLE DIHYDROCHLORIDE 0.5 MG/1
TABLET ORAL
Qty: 180 TABLET | Refills: 3 | Status: SHIPPED | OUTPATIENT
Start: 2021-09-09 | End: 2022-05-03 | Stop reason: SDUPTHER

## 2021-09-09 RX ORDER — FLUTICASONE PROPIONATE 50 MCG
2 SPRAY, SUSPENSION (ML) NASAL 2 TIMES DAILY
Qty: 16 G | Refills: 0 | Status: SHIPPED | OUTPATIENT
Start: 2021-09-09 | End: 2021-09-20 | Stop reason: SDUPTHER

## 2021-09-09 ASSESSMENT — ENCOUNTER SYMPTOMS
NAUSEA: 0
COUGH: 0
ABDOMINAL PAIN: 0
SHORTNESS OF BREATH: 0

## 2021-09-09 NOTE — PROGRESS NOTES
[facility] ([birth year]) 78 y.o. female here for evaluation of the following chief complaint(s):      HPI:  Chief Complaint   Patient presents with    Ear Fullness     right - for over a month     Medication Refill       Ongoing x 1 month with right ear fullness. Feesl like water is in ear. No popping or crackling. No pain. Muffled hearing in right era. No sinus congestion. Vitals:    09/09/21 1350   BP: (!) 104/54   Pulse: 60   Resp: 24       Patient Active Problem List   Diagnosis    Coronary artery disease involving native coronary artery of native heart without angina pectoris    S/P angioplasty with stent-1995 and 1996- in [address] Pure hypercholesterolemia    Tobacco abuse    Cardiomyopathy ([address] EF 40%    Acute CVA (cerebrovascular accident) ([address]    Mild intermittent asthma without complication    Recurrent major depressive disorder, in full remission ([address]    Paroxysmal atrial fibrillation ([address]        COLONOSCOPY - denied  MAMMO - denied  DEXA - denied  CT LUNG - denied  Jez Skelton - Dr. Buzz Hardin - 8/31/21  NEURO Dr. Cat Aceves - 10/7/21    REHAB Torie Garcia - 10/18/21      REHAB Evaluation - 9/8/21     Denies CP, SOB or chest tightness. AFIB. On Eliquis and Amiodorone. CAD on Plavix and ASA. On STATIN.      Still smoking 1/2 ppd. Denied quitting      SUBJECTIVE/OBJECTIVE:  Review of Systems   Constitutional: Negative for chills and fever. HENT: Positive for ear pain and hearing loss. Respiratory: Negative for cough and shortness of breath. Cardiovascular: Negative for chest pain. Gastrointestinal: Negative for abdominal pain and nausea. Skin: Negative for rash. Neurological: Positive for speech difficulty and weakness. Negative for dizziness, light-headedness and headaches. Psychiatric/Behavioral: Negative. Negative for confusion. Physical Exam  Constitutional:       General: She is not in acute distress.   HENT:      Right Ear: A middle ear effusion is present. Left Ear: A middle ear effusion is present. Eyes:      Pupils: Pupils are equal, round, and reactive to light. Cardiovascular:      Rate and Rhythm: Normal rate and regular rhythm. Heart sounds: No murmur heard. Pulmonary:      Effort: Pulmonary effort is normal.      Breath sounds: Normal breath sounds. No wheezing. Abdominal:      General: Bowel sounds are normal. There is no distension. Palpations: Abdomen is soft. Tenderness: There is no abdominal tenderness. Musculoskeletal:         General: No tenderness. Normal range of motion. Cervical back: Normal range of motion and neck supple. Skin:     General: Skin is warm and dry. Findings: No rash. Psychiatric:         Attention and Perception: Attention normal.         Mood and Affect: Mood normal.         Speech: Speech is delayed. Behavior: Behavior normal. Behavior is cooperative. Thought Content: Thought content normal.         Cognition and Memory: Cognition normal.         Judgment: Judgment normal.           ASSESSMENT/PLAN:   Diagnosis Orders   1. Dysfunction of right eustachian tube  fluticasone (FLONASE) 50 MCG/ACT nasal spray   2. Paroxysmal atrial fibrillation (HCC)  apixaban (ELIQUIS) 5 MG TABS tablet   3. Coronary artery disease involving native coronary artery of native heart without angina pectoris  clopidogrel (PLAVIX) 75 MG tablet   4. S/P coronary angioplasty  clopidogrel (PLAVIX) 75 MG tablet   5. Dilated cardiomyopathy ([address]     6. Acute CVA (cerebrovascular accident) ([facility]     7. Recurrent major depressive disorder, in full remission (HCC)  sertraline (ZOLOFT) 50 MG tablet   8.  RLS (restless legs syndrome)  pramipexole (MIRAPEX) 0.5 MG tablet         MDM: Flonase for KRISTEN/ETD   Chronic conditions stable   Labs reviewed   Refills as above   Follow up with Specialists   MAMMO and SIVAN denied   KNA             An electronic signature was used to authenticate this note.     --Edouard Gamboa, JANIA - CNP

## 2021-09-09 NOTE — PATIENT INSTRUCTIONS
You may receive a survey regarding the care you received during your visit. Your input is valuable to us. We encourage you to complete and return your survey. We hope you will choose us in the future for your healthcare needs. Tobacco Cessation Programs     Telephonic behavior modification  Wyatt Muller ([phone])   Counseling service for those who are ready to quit using tobacco.     Available for [place] residents, [place] recipients, pregnant women, or patients whose health plans or employers are members of the [address] [place] [address] behavior modification   http://[place]. Quitlogix. org   Online support program to help patients through each step of the quitting process. Available 24 hours a day 7 days a week. Provides up to date researched based tool, step-by-step guides, and motivational messages. Online behavior modification   www.lungusa.org/stop-smoking/how-to-quit   HelpLine: 1-[facility] (850-4656)   Email questions to:  Lito@[employer]. [employer]    Website offers resources to help tobacco users figure out their reasons for quitting and then take the big step of quitting for good. Hypnosis   Location: [facility], [facility] [place] [address], [place]   Contact: Tanisha Hu, PhD at [phone]   Hypnosis for tobacco cessation   Cost $225 for the initial session and $175 for each session afterwards. Most patients require 6-8 sessions. There is the option to submit through the patients insurance. Hypnosis and behavior modification   Location: [facility] [address] [place] [address] [place], [place]   Contact: Blue Bright, PhD at [phone]  Jerica Posey Counseling and hypnosis for nicotine addition   Cost: For uninsured patients:  Please call above phone number  Cost for insured patients depends on patients insurance plan.     Behavior modification   Location: [facility], [address] [facility] [address] [place] [address] [place] [address], [address]: [address] four one-on-one appointments between the patient and a respiratory therapist.  The four appointments span over three weeks. The respiratory therapist schedules one of the appointments to occur 48 hours after the patients quit date.  Cost $100 total for the four sessions.   Tobacco cessation products are not included in the cost and are not provided by [facility].     [facility]

## 2021-09-20 DIAGNOSIS — H69.81 DYSFUNCTION OF RIGHT EUSTACHIAN TUBE: ICD-10-CM

## 2021-09-20 RX ORDER — FLUTICASONE PROPIONATE 50 MCG
2 SPRAY, SUSPENSION (ML) NASAL 2 TIMES DAILY
Qty: 3 EACH | Refills: 3 | Status: SHIPPED | OUTPATIENT
Start: 2021-09-20

## 2021-09-20 NOTE — TELEPHONE ENCOUNTER
Requested Prescriptions     Pending Prescriptions Disp Refills    fluticasone (FLONASE) 50 MCG/ACT nasal spray 3 each 3     Si sprays by Nasal route 2 times daily       Asking for 90 day supply with refills.

## 2021-09-21 ENCOUNTER — OFFICE VISIT (OUTPATIENT)
Dept: FAMILY MEDICINE CLINIC | Age: 66
End: 2021-09-21
Payer: MEDICARE

## 2021-09-21 VITALS
WEIGHT: 169 LBS | RESPIRATION RATE: 12 BRPM | HEART RATE: 68 BPM | BODY MASS INDEX: 29.94 KG/M2 | SYSTOLIC BLOOD PRESSURE: 114 MMHG | DIASTOLIC BLOOD PRESSURE: 56 MMHG

## 2021-09-21 DIAGNOSIS — H65.01 NON-RECURRENT ACUTE SEROUS OTITIS MEDIA OF RIGHT EAR: Primary | ICD-10-CM

## 2021-09-21 DIAGNOSIS — I69.319 RESIDUAL COGNITIVE DEFICIT AS LATE EFFECT OF CEREBROVASCULAR ACCIDENT: ICD-10-CM

## 2021-09-21 DIAGNOSIS — I69.30 HISTORY OF CEREBROVASCULAR ACCIDENT (CVA) WITH RESIDUAL DEFICIT: ICD-10-CM

## 2021-09-21 DIAGNOSIS — R29.898 WEAKNESS OF RIGHT LEG: ICD-10-CM

## 2021-09-21 DIAGNOSIS — R53.1 WEAKNESS OF RIGHT SIDE OF BODY: ICD-10-CM

## 2021-09-21 PROCEDURE — 1090F PRES/ABSN URINE INCON ASSESS: CPT | Performed by: NURSE PRACTITIONER

## 2021-09-21 PROCEDURE — 99213 OFFICE O/P EST LOW 20 MIN: CPT | Performed by: NURSE PRACTITIONER

## 2021-09-21 PROCEDURE — 4004F PT TOBACCO SCREEN RCVD TLK: CPT | Performed by: NURSE PRACTITIONER

## 2021-09-21 PROCEDURE — G8417 CALC BMI ABV UP PARAM F/U: HCPCS | Performed by: NURSE PRACTITIONER

## 2021-09-21 PROCEDURE — 3017F COLORECTAL CA SCREEN DOC REV: CPT | Performed by: NURSE PRACTITIONER

## 2021-09-21 PROCEDURE — G8427 DOCREV CUR MEDS BY ELIG CLIN: HCPCS | Performed by: NURSE PRACTITIONER

## 2021-09-21 PROCEDURE — 4040F PNEUMOC VAC/ADMIN/RCVD: CPT | Performed by: NURSE PRACTITIONER

## 2021-09-21 PROCEDURE — G8400 PT W/DXA NO RESULTS DOC: HCPCS | Performed by: NURSE PRACTITIONER

## 2021-09-21 PROCEDURE — 1123F ACP DISCUSS/DSCN MKR DOCD: CPT | Performed by: NURSE PRACTITIONER

## 2021-09-21 RX ORDER — OFLOXACIN 3 MG/ML
10 SOLUTION AURICULAR (OTIC) DAILY
Qty: 5 ML | Refills: 0 | Status: SHIPPED | OUTPATIENT
Start: 2021-09-21 | End: 2021-09-28

## 2021-09-21 RX ORDER — AMOXICILLIN AND CLAVULANATE POTASSIUM 875; 125 MG/1; MG/1
1 TABLET, FILM COATED ORAL 2 TIMES DAILY WITH MEALS
Qty: 20 TABLET | Refills: 0 | Status: SHIPPED | OUTPATIENT
Start: 2021-09-21 | End: 2021-10-01

## 2021-09-21 ASSESSMENT — ENCOUNTER SYMPTOMS
SHORTNESS OF BREATH: 0
ABDOMINAL PAIN: 0
NAUSEA: 0
COUGH: 0

## 2021-09-21 NOTE — PROGRESS NOTES
Jason Oro ([birth year]) 78 y.o. female here for evaluation of the following chief complaint(s):      HPI:  Chief Complaint   Patient presents with    Otalgia     right    Ear Fullness    Other     requesting referral for speech, physical and occupational therapy--[facility] with ear fullness. Ongoing x 1.5 month with right ear fullness. Feels like water is in ear. No popping or crackling. No pain. Muffled hearing in right era. No sinus congestion. No benefit with Flonase. Onset of 1-2 days with ear pain. Deeper. Hurts to lay on ear. Mild ear drainage on pillow. Vitals:    09/21/21 1335   BP: (!) 114/56   Pulse: 68   Resp: 12       Patient Active Problem List   Diagnosis    Coronary artery disease involving native coronary artery of native heart without angina pectoris    S/P angioplasty with stent-1995 and 1996- in [facility]    Pure hypercholesterolemia    Tobacco abuse    Cardiomyopathy ([facility] EF 40%    Acute CVA (cerebrovascular accident) ([address]    Mild intermittent asthma without complication    Recurrent major depressive disorder, in full remission ([address]    Paroxysmal atrial fibrillation ([address]        COLONOSCOPY - denied  MAMMO - denied  DEXA - denied  CT LUNG - denied  [facility] - Dr. Karime Norwood - 8/31/21  NEURO Dr. Laith Sy - 10/7/21    REHAB Torie Garcia - 10/18/21      REHAB Evaluation - 9/8/21     Denies CP, SOB or chest tightness. AFIB. On Eliquis and Amiodorone. CAD on Plavix and ASA. On STATIN.      Still smoking 1/2 ppd. Denied quitting      SUBJECTIVE/OBJECTIVE:  Review of Systems   Constitutional: Negative for chills and fever. HENT: Positive for ear pain and hearing loss. Respiratory: Negative for cough and shortness of breath. Cardiovascular: Negative for chest pain. Gastrointestinal: Negative for abdominal pain and nausea. Skin: Negative for rash.    Neurological: Positive for speech difficulty and weakness. Negative for dizziness, light-headedness and headaches. Psychiatric/Behavioral: Negative. Negative for confusion. Physical Exam  Constitutional:       General: She is not in acute distress. HENT:      Right Ear: Drainage, swelling and tenderness (tragal tenderness) present. A middle ear effusion is present. Tympanic membrane is erythematous and bulging. Eyes:      Pupils: Pupils are equal, round, and reactive to light. Cardiovascular:      Rate and Rhythm: Normal rate and regular rhythm. Heart sounds: No murmur heard. Pulmonary:      Effort: Pulmonary effort is normal.      Breath sounds: Normal breath sounds. No wheezing. Abdominal:      General: Bowel sounds are normal. There is no distension. Palpations: Abdomen is soft. Tenderness: There is no abdominal tenderness. Musculoskeletal:         General: No tenderness. Normal range of motion. Cervical back: Normal range of motion and neck supple. Skin:     General: Skin is warm and dry. Findings: No rash. Psychiatric:         Attention and Perception: Attention normal.         Mood and Affect: Mood normal.         Speech: Speech is delayed. Behavior: Behavior normal. Behavior is cooperative. Thought Content: Thought content normal.         Cognition and Memory: Cognition normal.         Judgment: Judgment normal.           ASSESSMENT/PLAN:   Diagnosis Orders   1. Non-recurrent acute serous otitis media of right ear  amoxicillin-clavulanate (AUGMENTIN) 875-125 MG per tablet    ofloxacin (FLOXIN OTIC) 0.3 % otic solution   2. 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 Weakness of right leg  [facility] - [address]   4. Weakness of right side of body  [address] [facility] [place], [place]   5.  History of cerebrovascular accident (CVA) with residual deficit  [address] [facility]: Augmentin + Ofloxacin for AOM and EOM   Refer to ST, OT and PT for Post CVA   RTO PRN               An electronic signature was used to authenticate this note.     --Katelyn Fox, JANIA - CNP

## 2021-09-21 NOTE — PATIENT INSTRUCTIONS
respiratory therapist.  The four appointments span over three weeks. The respiratory therapist schedules one of the appointments to occur 48 hours after the patients quit date.  Cost $100 total for the four sessions.   Tobacco cessation products are not included in the cost and are not provided by [facility].

## 2021-09-27 ENCOUNTER — HOSPITAL ENCOUNTER (OUTPATIENT)
Dept: OCCUPATIONAL THERAPY | Age: 66
Setting detail: THERAPIES SERIES
Discharge: HOME | End: 2021-09-27
Payer: MEDICARE

## 2021-09-27 PROCEDURE — 97165 OT EVAL LOW COMPLEX 30 MIN: CPT

## 2021-09-27 PROCEDURE — 97110 THERAPEUTIC EXERCISES: CPT

## 2021-09-27 NOTE — PROGRESS NOTES
** PLEASE SIGN, DATE AND TIME CERTIFICATION BELOW AND RETURN TO [facility] OUTPATIENT REHABILITATION (FAX #: [phone]). ATTEST/CO-SIGN IF ACCESSING VIA [employer]. THANK YOU.**    I certify that I have examined the patient below and determined that Physical Medicine and Rehabilitation service is necessary and that I approve the established plan of care for up to 90 days or as specifically noted. Attestation, signature or co-signature of physician indicates approval of certification requirements.    ________________________ ____________ __________  Physician Signature   Date   Time   [address] THERAPY  [x] EVALUATION  [] DAILY NOTE (LAND) [] DAILY NOTE (AQUATIC ) [] PROGRESS NOTE [] DISCHARGE NOTE    [] [facility]   [] [address]    [] [address] [facility]   [x] Noemi Bhagat    Date: 2021  Patient Name:  Dony Wiggins  :   MRN: [id number]  CSN: [id number]    Referring Practitioner JANIA Andrews -*   Diagnosis Other symptoms and signs involving the musculoskeletal system [R29.898]  Unspecified sequelae of cerebral infarction [I69.30]    Treatment Diagnosis Late effects of CVA   Date of Evaluation 21      Functional Outcome Measure Used Upper Extremity Functional Scale   Functional Outcome Score 42/80 (21)       Insurance: Primary: Payor: MEDICARE /  /  / ,   Secondary: [facility]   Authorization Information: Unlimited visits based on medical necessity, iontophoresis and hot/cold packs are not covered   Visit # 1, 1/10 for progress note   Visits Allowed: Unlimited visits based on medical necessity   Recertification Date:    Physician Follow-Up: Not stated   Physician Orders: Script dated 21 for eval and treat   Pertinent History: Patient reports she had a CVA around 2021 in which her speech was slurred. She states her right side strength and ROM was affected. Patient reports she was admitted to [facility] for about 4 days. Patient reports she had 3 weeks of home health therapy, PT, OT, ST.  Patient lists past medical history as heart attack, PACEMAKER, high blood pressure and stroke. SUBJECTIVE: Patient states she wants to get her speech back and be able to write better. She states her right leg drags. Social/Functional History:  Medications and Allergies have been reviewed and are listed on the [address] Minda[place]queria lives alone in [place] at [facility] with stairs and a handrail to enter. .    Task Prior Level of Function  (current level of function addressed below)   ADLs  Independent   Ambulation Independent   Transfers Independent   Hobbies Read, watch t.v. sit outside. Driving Active    Work Retired     OBJECTIVE:  Hand Dominance right handed       Vision Corrected wears glasses   Hearing WFL   Cognition  Slow Processing per patient       ADL's Feeding: Independent. Grooming: Modified Independent. increased time and difficulty with combing hair and brushing teeth. Bathing: Modified Independent. increased time  Upper Extremity Dressing: Modified Independent. increased time to get dressed  Lower Extremity Dressing: Modified Independent. Toileting: Independent. Toilet Transfer: Independent. Shower Transfer: Modified Independent. grab bar   Tub/Shower Set-Up Walk-in Shower   Toilet Set-Up Standard Height Toilet   Bathroom Equipment Grab bars in shower   Adaptive Equipment Used none   Bed Mobility  Not Tested   Transfers Sit to Stand:  Independent. Stand to Sit: Independent. Balance Standing Balance: Independent. Functional Mobility Assistive Device: None  Assist Level: Independent. Distance: 30 feet in clinic        Sensation Right Upper Extremity: Diminished. Dulled sensation per patient. Patient got 2/3 correct for stereognosis naming objects.   Patient named dime rather than oswaldo. Coordination 9 Hole Peg Test:   Right: 30 seconds     Left:   24 seconds. Finger to Nose:  intact   Movement Descriptors WFL   Tone WFL       RIGHT UPPER EXTREMITY  RANGE OF MOTION    AROM PROM COMMENTS         Shoulder Flexion 145 150    Shoulder Extension 43 50    Shoulder Abduction 112 122    Shoulder External Rotation 76 85    Shoulder Internal Rotation 35 52    Elbow Flexion WFL     Elbow Extension WFL     Wrist Flexion WFL     Wrist Extension WFL     General Hand ROM          RIGHT UPPER EXTREMITY  STRENGTH     COMMENTS   Shoulder Flexion 4-/5    Shoulder Abduction 4-/5    Elbow Extension 4/5    Elbow Flexion 4-/5    Wrist Extension 4/5    Wrist Flexion 4/5         RIGHT LEFT    Strength Settinnd 22 31   Pinch Strength Tip Pinch: 8 8    Lateral Pinch 10 13    3 Point Pinch 7 10      General Strength Comment        TREATMENT   Precautions: None per patient    Pain: Denies     X in shaded column indicates Activity Completed Today   Modalities Parameters/  Location  Notes/Comments                     Manual Therapy Time/  Technique  Notes/Comments                     Exercises   Sets/  Sec Reps  Notes/Comments   Submaximal shoulder isometrics for flexion, extension, IR, ER and abduction 5 second hold 5  X                                              Activities Time    Notes/Comments                       Specific Interventions Next Treatment: UE ROM, Strength,  and pinch strength, fine motor coordination    Activity/Treatment Tolerance:  [x]  Patient tolerated treatment well  []  Patient limited by fatigue  []  Patient limited by pain   []  Patient limited by other medical complications  []  Other:     Assessment: Patient is s/p CVA with right sided weakness. Patient indicates writing, speech and right sided strength is impaired. Patient has impaired fine motor coordination, right UE strength and ROM. She has difficulty with ADLs, homemaking tasks, opening jars and packages. Patient requires skilled OT to increase right hand pinch and  strength, coordination, shoulder and elbow strength and improve fine motor coordination for return to overall prior level of function including writing ease. Areas for Improvement: impaired coordination, impaired ROM and impaired strength  Prognosis: good    GOALS:  Patient Goal: improve writing, speech and strength    Short Term Goals:  Time Frame: 5 weeks  1. Patient will improve right fine motor coordination as demonstrated by completing 9 hole peg in 27 seconds for ease with writing. 2.  Patient will increase AROM right shoulder flexion to 155, extension to 50, abduction to 122, IR to 45 degrees for increased ease with doing her hair and dressing ease. 3.  Patient will increase right  strength to 27#, 3 point pinch to 9# and lateral pinch to 11# for increased ease with opening packages and jars. Time Frame: 8 weeks  1. Patient will improve UEFS to at least 52.  2.  Patient will report handwriting signature at least 80% of prior to CVA. 3.  Patient will be independent with UE finalized HEP for increased ease with homemaking and carrying groceries. Patient Education:   [x]  HEP/Education Completed: Plan of Care, Goals, fine motor coordination activities, AROM shoulder flexion, extension, IR, ER, abduction, submaximal shoulder isometrics with handouts given   [address] Access Code for HEP:   []  No new Education completed  []  Reviewed Prior HEP      [x]  Patient verbalized and/or demonstrated understanding of education provided. []  Patient unable to verbalize and/or demonstrate understanding of education provided. Will continue education. [x]  Barriers to learning: none    PLAN:  Treatment Recommendations: Strengthening, Range of Motion, Home Exercise Program and fine motor coordination    [x]  Plan of care initiated. Plan to see patient 2 times per week for 8 weeks to address the treatment planned outlined above.   [] Continue with current plan of care  []  Modify plan of care as follows:    []  Hold pending physician visit  []  Discharge    Time In 1259   Time Out 1341   Timed Code Minutes: 10 min   Total Treatment Time: 42 min       Electronically Signed by:  BENJAMÍN Segovia/HAYDE #[id number]

## 2021-09-28 ENCOUNTER — HOSPITAL ENCOUNTER (OUTPATIENT)
Dept: SPEECH THERAPY | Age: 66
Setting detail: THERAPIES SERIES
Discharge: HOME | End: 2021-09-28
Payer: MEDICARE

## 2021-09-28 PROCEDURE — 92523 SPEECH SOUND LANG COMPREHEN: CPT

## 2021-09-28 NOTE — PROGRESS NOTES
** PLEASE SIGN, DATE AND TIME CERTIFICATION BELOW AND RETURN TO [facility] OUTPATIENT REHABILITATION (FAX #: [phone]). ATTEST/CO-SIGN IF ACCESSING VIA [employer]. THANK YOU.**    I certify that I have examined the patient below and determined that Physical Medicine and Rehabilitation service is necessary and that I approve the established plan of care for up to 90 days or as specifically noted. Attestation, signature or co-signature of physician indicates approval of certification requirements.    ________________________ ____________ __________  Physician Signature   Date   Time   [facility]  [x] SPEECH LANGUAGE COGNITIVE EVALUATION  [] DAILY NOTE   [] PROGRESS NOTE [] DISCHARGE NOTE    [] [facility]   [] [address]    [] [address] [facility]   [x] Radha Pali    Date: 2021  Patient Name:  Norma Gray  :   MRN: [id number]  CSN: [id number]    Referring Practitioner JANIA Thornton   Diagnosis Residual cognitive deficit as late effect of cerebrovascular accident    Treatment Diagnosis Cognitive deficit   Date of Evaluation 21      Functional Outcome Measure Used [facility] Memory   Functional Outcome Score Level 4 (21)       Insurance: Primary: Payor: Lin Montalvo /  /  / ,   Secondary: [address] [place] [address]: No precert needed   Visit # 1, 1/10 for progress note   Visits Allowed: Unlimited visits based on medical necessity   Recertification Date:    Physician Follow-Up: 2021 f/u with MEGAN Casillas   Physician Orders: Eval and treat   Pertinent History: Patient reports she had a stroke last month in which her speech was slurred. Patient reports she was hospitalized to [facility] for about 4 days. She states she did receive speech therapy services during hospitalization.  Patient reports she had 3 weeks of home health therapy, PT, OT, ST. Currently, eriberto states she has noticed difficulty in her speech and ability to remember and form words. SUBJECTIVE: Patient pleasant and cooperative throughout evaluation. No observation. Social/Functional History:  Electronic Medical Record reviewed and up to date    Ileana Peralta lives alone in [facility] with stairs and a handrail to enter. .    Task Prior Level of Function Current Level of Function   ADLs  Independent Independent   Finance Management Independent Independent   Medication Management Independent Independent Patient reports to use a medicine box/pill organizer to manage medications   Educational Level 12th grade 12th grade   Driving Active  Active    Work Retired Retired   Hobbies Watch TV Watch TV   Vision Status Corrected Corrected   Hearing Status WNL WNL   Diet Regular diet with thin liquids Regular diet with thin liquids       ORAL MOTOR:  Oral-Motor Assessment WNL *mild reduced ROM in elevating tongue. SPEECH / VOICE:  Speech and Voice appear to be grossly intact for basic and complex daily communication    LANGUAGE:  Receptive:  1 Step Commands: 2/2  2 Step Commands: 2/2  Simple Yes/No Questions: 2/2  Complex Yes/No Questions: 2/2  Following Verbal Instructions: 2/2  Identify Objects/Pictures: 3/3    Expressive:  Automatic Speech: 2/2  Sentence Completion (automatic): 2/2  Confrontational Naming: 3/3  Responsive Namin/2  Divergent Naming (concrete): 4 words per minute (target 11)  Conversational Speech: WFL  Repetition: Impaired at phrase and sentence level  Writing: See notes below  Expressive language skills appear to be grossly intact for basic and complex daily communication. *Patient reports difficulty with writing. SLP had patient write words upon SLP auditory provision. Patient correctly spelled \"sit, airplane, twist, big pie, under the old wooden bridge\". OT has assessed writing as well.  Suspect patient's writing concerns are related to arm weakness as language appears adequate for formulating and writing thoughts. COGNITION:  [place] Cognitive Assessment (MOCA) version 7.2 completed. Pt scored 27/30. Normal is greater than or equal to 26/30. Inclusion of +1 point given highest level of education achieved less than/equal to 12th grade or GED with limited-0 post-secondary schooling   Orientation:   Immediate Recall: Trial 1: 3/5 indep, Trial 2:  indep  Short-Term Recall:  indep  Divergent Namin words per minute (target 11)  Reasonin/2   Attention:   Math Computation: 3/3  Executive Functionin/5    SWALLOWING:  Patient reports prolonged mastication and is only consuming 50% of meals. States having no interest or appetite after consuming half of meals. Denies fatigue or odynophagia. Patient reports she is not concerned with her swallowing, she believes her lack of appetitite is a \"phase\". IMPRESSIONS: Patient presents with a mild cognitive impairment as evidenced by deficits in higher level/complex cognitive tasks. Patient demonstrated deficits in higher level naming, immediate recall, reasoning and executive function tasks. Assessment administered this date does not fully address other higher level cognitive tasks such as divided and alternating attention, medication management, and finances. Patient reports she is independent in these activities as she lives at home alone. Therefore, it is recommended patient receive skilled speech therapy to address aforementioned deficits to promote safe participation in these activities in home environment without support. Receptive and expressive language appear grossly adequate for communication of basic wants/needs. Patient did demonstrate deficits with phrase/sentence repetition and higher level naming. These domains will directly be addressed in therapy to promote successful communication exchanges in different environments.    In regards to swallowing, patient is consuming a regular diet with thin liquids without overt s/s of aspiration. Patient did report lack of appetite and only consuming 50% of meals; however, denied fatigue or odynophagia. Speech therapy not warranted at this time for swallowing; however, SLP will continue to monitor and implement additional goals and adjust POC if needed. Assessment: progressing towards goals  Areas for Improvement: Impaired cognition  Prognosis: good  Specific Interventions Next Treatment: immediate recall, working memory, higher level naming, sentence repetition, divided and alternating attention, higher level reasoning and executive function tasks. Activity/Treatment Tolerance:  [x]  Patient tolerated treatment well  []  Patient limited by fatigue  []  Patient limited by pain   []  Patient limited by other medical complications  []  Other:     GOALS:  Patient Goal: Did not obtain, will obtain in next therapy session    Short-term Goals  Timeframe for Short-term Goals: 4 weeks  Goal 1: Patient will complete immediate recall tasks with 5-6 units while implementing memory strategies with 80% accuracy with min cues to promote retention of novel information. Goal 2: Patient will complete working memory tasks with 80% accuracy given min cues to improve mental flexibility and promote retention of information. Goal 3: Patient will complete higher level naming and sentence repetition tasks with 80% accuracy given min cues to promote successful communication exchanges across various settings. Goal 4: Patient will complete divided and alteranting attention tasks with no more than 2 errors in a 5 minute time span to promote safe participation in complex cognitive activities (ex: driving). Goal 5: Patient will complete higher level reasoning and executive function tasks (medications, finances, scheduling) with 80% accuracy with min cues to promote safe participation in these activities.      Long-term Goals  Timeframe for Long-term Goals: 4

## 2021-10-07 ENCOUNTER — APPOINTMENT (OUTPATIENT)
Dept: SPEECH THERAPY | Age: 66
End: 2021-10-07
Payer: MEDICARE

## 2021-10-19 ENCOUNTER — HOSPITAL ENCOUNTER (OUTPATIENT)
Dept: OCCUPATIONAL THERAPY | Age: 66
Setting detail: THERAPIES SERIES
Discharge: HOME | End: 2021-10-19
Payer: MEDICARE

## 2021-10-19 ENCOUNTER — HOSPITAL ENCOUNTER (OUTPATIENT)
Dept: SPEECH THERAPY | Age: 66
Setting detail: THERAPIES SERIES
Discharge: HOME | End: 2021-10-19
Payer: MEDICARE

## 2021-10-19 PROCEDURE — 97130 THER IVNTJ EA ADDL 15 MIN: CPT

## 2021-10-19 PROCEDURE — 97110 THERAPEUTIC EXERCISES: CPT

## 2021-10-19 PROCEDURE — 97129 THER IVNTJ 1ST 15 MIN: CPT

## 2021-10-19 NOTE — PROGRESS NOTES
[address] [facility]  [] SPEECH LANGUAGE COGNITIVE EVALUATION  [x] DAILY NOTE   [] PROGRESS NOTE [] DISCHARGE NOTE    [] OUTPATIENT REHABILITATION [facility]   [] [place] [address]    [] [facility]   [] [facility]    Date: 10/19/2021  Patient Name:  Yolanda Bhandari  : [birth year]  MRN: [id number]    Referring Practitioner JANIA Dorado -*   Diagnosis Residual cognitive deficit as late effect of cerebrovascular accident    Treatment Diagnosis Cognitive deficits    Date of Evaluation 10/19/21      Functional Outcome Measure Used [facility] Memory   Functional Outcome Score Level 4 (21)       Insurance: Primary: Payor: [place] /  /  / ,   Secondary: [facility]   Authorization Information: No pre-cert required. Visit # 2, 2/10 for progress note   Visits Allowed: Unlimited visits based on medical necessity   Recertification Date:    Physician Follow-Up: 21 f/u with MEGAN Salcido   Physician Orders: Evaluate and treat   Pertinent History: Patient reports she had a stroke last month in which her speech was slurred. Patient reports she was hospitalized to [facility] for about 4 days. She states she did receive speech therapy services during hospitalization. Patient reports she had 3 weeks of home health therapy, PT, OT, ST. Currently, paitent states she has noticed difficulty in her speech and ability to remember and form words. SUBJECTIVE: Patient pleasant and cooperative. Arrived alone for session. Reporting history of CVA in middle of August and was at [facility]. Completed inpatient rehab during time at [facility]. Denies having had home health services despite patient telling evaluating therapist at assessment that she had received home health. Denies pain and reports she is doing well overall.     Patient reporting upcoming travel plans where she will leave  and travel [address] in her camper with her brother, sister-in-law, friend, and 2 animals. Plan to discharge patient prior to this date. SHORT TERM GOAL #1:  Goal 1: Patient will complete immediate recall tasks with 5-6 units while implementing memory strategies with 80% accuracy with min cues to promote retention of novel information. INTERVENTIONS: SLP provided patient with handout on internal and external short term memory strategies. Educated patient on short-term vs long-term vs immediate memory processes. Encouraged use of memory strategies for improved success with recall of important information within daily schedule. Patient reports using a whiteboard at home to track appointments in a list format. Encouraged continued use as well as consideration for monthly calendar to improve organization. Patient reports not remembering to take her medications yesterday morning, and not taking morning meds til 6pm and then taking bedtime medications. Describes this did not make her feel well. SLP explained importance of recalling appropriate times for taking prescribed medications. Patient independently stating need to set alarm for medications after seeing alarm as strategy listed on strategy list. SLP assisted patient in creating two alarms on iPhone (1000am and 700pm) with alarm titles adjusted and daily recurring option selected. Included setting for Snooze function and explained benefit of not fully dismissing alarm until medications are taken to prevent dismissing alarm and not taking medications. Patient with good comprehension expressed verbally. SLP also provided handout on WRAP memory strategies (write it down, repeat it, associate it, and picture it). Explained as 4 functional recall strategies, and patient with good comprehension of information. Patient reports using the following already: write it down and repeat it.      SHORT TERM GOAL #2:  Goal 2: Patient will complete working memory tasks with 80% accuracy given min cues to improve mental flexibility and promote retention of information. INTERVENTIONS: Did not directly address this session. Explained goal to patient as component of plan of care. SHORT TERM GOAL #3:  Goal 3: Patient will complete higher level naming and sentence repetition tasks with 80% accuracy given min cues to promote successful communication exchanges across various settings. INTERVENTIONS: Did not directly address this session. Explained goal to patient as component of plan of care. SHORT TERM GOAL #4:  Goal 4: Patient will complete divided and alteranting attention tasks with no more than 2 errors in a 5 minute time span to promote safe participation in complex cognitive activities (ex: driving). INTERVENTIONS: Did not directly address this session. Explained goal to patient as component of plan of care. SHORT TERM GOAL #5:  Goal 5: Patient will complete higher level reasoning and executive function tasks (medications, finances, scheduling) with 80% accuracy with min cues to promote safe participation in these activities. INTERVENTIONS: Did not directly address this session. Explained goal to patient as component of plan of care. Long-term Goals  Timeframe for Long-term Goals: 4 weeks  Goal 1: Patient will improve FOM, NICOLE NOMs Memory score from a level 4 to a level 6 to independently use external memory strategies for completion of complex cognitive tasks. Assessment: Progressing toward goals. Agreeable to participation in interventions. Specific Interventions Next Treatment: immediate recall, working memory, higher level naming, sentence repetition, divided and alternating attention, higher level reasoning and executive function tasks.      Activity/Treatment Tolerance:  [x]  Patient tolerated treatment well  []  Patient limited by fatigue  []  Patient limited by pain   []  Patient limited by other medical complications  []  Other:     Patient Education:   [x]  HEP/Education Completed: Plan of Care, Goals, internal and external memory strategies  []  No new Education completed  []  Reviewed Prior HEP      [x]  Patient verbalized and/or demonstrated understanding of education provided. []  Patient unable to verbalize and/or demonstrate understanding of education provided. Will continue education. []  Barriers to learning:     PLAN:  []  Plan of care initiated. Plan to see patient 1 time per week for 4 weeks to address the treatment planned outlined above.   [x]  Continue with current plan of care  []  Modify plan of care as follows:    []  Hold pending physician visit  []  Discharge    Time In 0915   Time Out 0945   Timed Code Minutes: 30 min   Total Treatment Time: 27 min       Melanie Berrios M.S., [address] [place] [address]

## 2021-10-19 NOTE — PROGRESS NOTES
[address]  [] EVALUATION  [x] DAILY NOTE (LAND) [] DAILY NOTE (AQUATIC ) [] PROGRESS NOTE [] DISCHARGE NOTE    [] [facility]   [] [address]    [] [address] [facility]   [x] Joan Palomares    Date: 10/19/2021  Patient Name:  Dejuan Hoffmann  :   MRN: [id number]  CSN: [id number]    Referring Practitioner No ref. provider found   Diagnosis No admission diagnoses are documented for this encounter. Treatment Diagnosis Late effects of CVA   Date of Evaluation 21      Functional Outcome Measure Used Upper Extremity Functional Scale   Functional Outcome Score 42/80 (21)       Insurance: Primary: Payor: MEDICARE /  /  / ,   Secondary: [facility]   Authorization Information: Unlimited visits based on medical necessity, iontophoresis and hot/cold packs are not covered   Visit # 2, 2/10 for progress note   Visits Allowed: Unlimited visits based on medical necessity   Recertification Date:    Physician Follow-Up: Not stated   Physician Orders: Script dated 21 for eval and treat   Pertinent History: Patient reports she had a CVA around 2021 in which her speech was slurred. She states her right side strength and ROM was affected. Patient reports she was admitted to [facility] for about 4 days. Patient reports she had 3 weeks of home health therapy, PT, OT, ST.  Patient lists past medical history as heart attack, PACEMAKER, high blood pressure and stroke. SUBJECTIVE: Patient reports her hand is a lot better. Patient indicates her speech is taking longer to come back better.      OBJECTIVE:      TREATMENT   Precautions: None per patient    Pain: Denies     X in shaded column indicates Activity Completed Today   Modalities Parameters/  Location  Notes/Comments                     Manual Therapy Time/  Technique  Notes/Comments                     Exercises   Sets/  Sec Reps  Notes/Comments   Submaximal shoulder isometrics for flexion, extension, IR, ER and abduction 5 second hold 10 X Cues for technique   Orange putty for right , pinch and taffy pulls  4 minutes X    AROM B shoulder flexion, abduction 1 10 each X                                Activities Time    Notes/Comments   Fine motor coordination task of finding and pulling out 12 pennies from orange putty. X    Patient practiced writing her signature x 8 and months of the year 5 minutes X Handwriting task. Signature started to go up and patient stated she thinks her hand is getting tired. Patient indicates handwriting is not as smooth as it used to be. She states she needs to concentrate more. Specific Interventions Next Treatment: UE ROM, Strength,  and pinch strength, fine motor coordination    Activity/Treatment Tolerance:  [x]  Patient tolerated treatment well  []  Patient limited by fatigue  []  Patient limited by pain   []  Patient limited by other medical complications  []  Other:     Assessment: Patient is progressing towards her goals. Patient indicates she has been practicing handwriting at home. Areas for Improvement: impaired coordination, impaired ROM and impaired strength  Prognosis: good    GOALS:  Patient Goal: improve writing, speech and strength    Short Term Goals:  Time Frame: 5 weeks  1. Patient will improve right fine motor coordination as demonstrated by completing 9 hole peg in 27 seconds for ease with writing. 2.  Patient will increase AROM right shoulder flexion to 155, extension to 50, abduction to 122, IR to 45 degrees for increased ease with doing her hair and dressing ease. 3.  Patient will increase right  strength to 27#, 3 point pinch to 9# and lateral pinch to 11# for increased ease with opening packages and jars. Time Frame: 8 weeks  1.   Patient will improve UEFS to at least 52.  2.  Patient will report handwriting signature at least 80% of prior to

## 2021-10-21 ENCOUNTER — HOSPITAL ENCOUNTER (OUTPATIENT)
Dept: OCCUPATIONAL THERAPY | Age: 66
Setting detail: THERAPIES SERIES
Discharge: HOME | End: 2021-10-21
Payer: MEDICARE

## 2021-10-21 ENCOUNTER — TELEPHONE (OUTPATIENT)
Dept: FAMILY MEDICINE CLINIC | Age: 66
End: 2021-10-21

## 2021-10-21 DIAGNOSIS — H69.81 ETD (EUSTACHIAN TUBE DYSFUNCTION), RIGHT: Primary | ICD-10-CM

## 2021-10-21 PROCEDURE — 97110 THERAPEUTIC EXERCISES: CPT

## 2021-10-21 NOTE — TELEPHONE ENCOUNTER
Spoke with patient willing to do hearing test at [address] [place] [address]. Please place orders. Patient aware that audiology will call her to schedule.

## 2021-10-21 NOTE — TELEPHONE ENCOUNTER
Confirm this is related to her ear pain? If so will need to get hearing testing prior to seeing ENT and will place orders for both.

## 2021-10-21 NOTE — PROGRESS NOTES
[address]  [] EVALUATION  [x] DAILY NOTE (LAND) [] DAILY NOTE (AQUATIC ) [] PROGRESS NOTE [] DISCHARGE NOTE    [] [facility]   [] [address]    [] [address] [facility]   [x] Jaren End    Date: 10/21/2021  Patient Name:  Martita Cornejo  :   MRN: [id number]  CSN: [id number]    Referring Practitioner No ref. provider found   Diagnosis No admission diagnoses are documented for this encounter. Treatment Diagnosis Late effects of CVA   Date of Evaluation 21      Functional Outcome Measure Used Upper Extremity Functional Scale   Functional Outcome Score 42/80 (21)       Insurance: Primary: Payor: MEDICARE /  /  / ,   Secondary: [facility]   Authorization Information: Unlimited visits based on medical necessity, iontophoresis and hot/cold packs are not covered   Visit # 3, 3/10 for progress note   Visits Allowed: Unlimited visits based on medical necessity   Recertification Date:    Physician Follow-Up: Not stated   Physician Orders: Script dated 21 for eval and treat   Pertinent History: Patient reports she had a CVA around 2021 in which her speech was slurred. She states her right side strength and ROM was affected. Patient reports she was admitted to [facility] for about 4 days. Patient reports she had 3 weeks of home health therapy, PT, OT, ST.  Patient lists past medical history as heart attack, PACEMAKER, high blood pressure and stroke. SUBJECTIVE: Patient reports she has been having issues with difficulty hearing with her right ear since before her stroke.      OBJECTIVE:      TREATMENT   Precautions: None per patient    Pain: Denies     X in shaded column indicates Activity Completed Today   Modalities Parameters/  Location  Notes/Comments                     Manual Therapy Time/  Technique  Notes/Comments                     Exercises   Sets/  Sec Reps  Notes/Comments   Submaximal shoulder isometrics for flexion, extension, IR, ER and abduction 5 second hold 10 X Cues for technique   Orange putty for right , pinch and taffy pulls  4 minutes X    AROM B shoulder flexion, abduction 1 10 each X With mirror for visual feedback, cues for 2 second hold. Patient indicates more difficulty with right. Biodex UBE at 80 RPM 3 minutes backward and 2 minutes forward  5 minutes X    Pulleys for shoulder flexion 1 15 X                  Activities Time    Notes/Comments   Fine motor coordination task of finding and pulling out 12 pennies from orange putty. Safe N Cleare Pegboard for fine motor coordination task with right hand, placing pegs, washers and sleeves 6 minutes X In hand manipulation to take pegs out. Patient able to hold 9 pegs before dropping. Patient practiced writing her signature x 8 and months of the year 5 minutes  Handwriting task. Signature started to go up and patient stated she thinks her hand is getting tired. Patient indicates handwriting is not as smooth as it used to be. She states she needs to concentrate more. Pinch strength:  Patient pinched yellow, red, green, blue and black clothespins on vertical pole with right hand for pinch strengthening. X      Specific Interventions Next Treatment: UE ROM, Strength,  and pinch strength, fine motor coordination    Activity/Treatment Tolerance:  [x]  Patient tolerated treatment well  []  Patient limited by fatigue  []  Patient limited by pain   []  Patient limited by other medical complications  []  Other:     Assessment: Patient is progressing towards her goals. Areas for Improvement: impaired coordination, impaired ROM and impaired strength  Prognosis: good    GOALS:  Patient Goal: improve writing, speech and strength    Short Term Goals:  Time Frame: 5 weeks  1.   Patient will improve right fine motor coordination as demonstrated by completing 9 hole peg in 27 seconds for ease with writing. 2.  Patient will increase AROM right shoulder flexion to 155, extension to 50, abduction to 122, IR to 45 degrees for increased ease with doing her hair and dressing ease. 3.  Patient will increase right  strength to 27#, 3 point pinch to 9# and lateral pinch to 11# for increased ease with opening packages and jars. Time Frame: 8 weeks  1. Patient will improve UEFS to at least 52.  2.  Patient will report handwriting signature at least 80% of prior to CVA. 3.  Patient will be independent with UE finalized HEP for increased ease with homemaking and carrying groceries. Patient Education:   []  HEP/Education Completed: Plan of Care, Goals, fine motor coordination activities, AROM shoulder flexion, extension, IR, ER, abduction, submaximal shoulder isometrics with handouts given   [address] Access Code for HEP:   []  No new Education completed  [x]  Reviewed Prior HEP      [x]  Patient verbalized and/or demonstrated understanding of education provided. []  Patient unable to verbalize and/or demonstrate understanding of education provided. Will continue education. [x]  Barriers to learning: none    PLAN:  Treatment Recommendations: Strengthening, Range of Motion, Home Exercise Program and fine motor coordination    []  Plan of care initiated. Plan to see patient 2 times per week for 8 weeks to address the treatment planned outlined above. [x]  Continue with current plan of care  []  Modify plan of care as follows:    []  Hold pending physician visit  []  Discharge    Time In 0930   Time Out 1008   Timed Code Minutes: 38 min   Total Treatment Time: 38 min       Electronically Signed by:  BENJAMÍN Meyer/HAYDE #[id number]

## 2021-10-26 ENCOUNTER — HOSPITAL ENCOUNTER (OUTPATIENT)
Dept: AUDIOLOGY | Age: 66
Discharge: HOME | End: 2021-10-26
Payer: MEDICARE

## 2021-10-26 PROCEDURE — 92557 COMPREHENSIVE HEARING TEST: CPT | Performed by: AUDIOLOGIST

## 2021-10-26 PROCEDURE — 92567 TYMPANOMETRY: CPT | Performed by: AUDIOLOGIST

## 2021-10-26 NOTE — PROGRESS NOTES
AUDIOLOGICAL EVALUATION      REASON FOR TESTING:  Audiometric evaluation per the request of MEGAN Marroquin, due to the diagnosis of acute otitis media of the right ear. The patient reports right-sided aural fullness, otorrhea, and decreased hearing for approximately the past 3 months. She denies any significant otalgia, tinnitus, prior history of ear infections or ear surgery. She denies any history of loud noise exposure. OTOSCOPY: Clear external ear canals and visible tympanic membranes, bilaterally. AUDIOGRAM            Reliability: Good  Audiometer Used:  [id number]        COMMENTS: Mild sloping to moderately-severe sensorineural hearing loss in the left ear and mild to profound mixed hearing loss in the right ear. Speech reception thresholds consistent with pure tone findings, bilaterally. Word recognition scores are excellent, bilaterally, with speech presented at slightly elevated levels in quiet. Tympanometry revealed normal peak pressure and normal middle ear compliance for the left ear. The right tympanogram showed a normal ear canal volume with no measurable compliance, suggesting possible middle ear dysfunction. RECOMMENDATION(S):  1. Follow up with referring provider as planned. 2. Consider ENT evaluation due to abnormal tympanogram and conductive component in the right ear. 3. Repeat testing following medical management or in 6-12 months to rule out progression.

## 2021-10-28 ENCOUNTER — HOSPITAL ENCOUNTER (OUTPATIENT)
Dept: OCCUPATIONAL THERAPY | Age: 66
Setting detail: THERAPIES SERIES
Discharge: HOME | End: 2021-10-28
Payer: MEDICARE

## 2021-10-28 ENCOUNTER — HOSPITAL ENCOUNTER (OUTPATIENT)
Dept: SPEECH THERAPY | Age: 66
Setting detail: THERAPIES SERIES
Discharge: HOME | End: 2021-10-28
Payer: MEDICARE

## 2021-10-28 PROCEDURE — 97130 THER IVNTJ EA ADDL 15 MIN: CPT

## 2021-10-28 PROCEDURE — 97530 THERAPEUTIC ACTIVITIES: CPT

## 2021-10-28 PROCEDURE — 97129 THER IVNTJ 1ST 15 MIN: CPT

## 2021-10-28 PROCEDURE — 97110 THERAPEUTIC EXERCISES: CPT

## 2021-10-28 NOTE — PROGRESS NOTES
[address]  [] EVALUATION  [x] DAILY NOTE (LAND) [] DAILY NOTE (AQUATIC ) [] PROGRESS NOTE [] DISCHARGE NOTE    [] [address] [facility] [place]   [] [address]    [] [address] [facility]   [x] Mian Barahona    Date: 10/28/2021  Patient Name:  Stephy Esteves  :   MRN: [id number]  CSN: [id number]    Referring Practitioner Hudson Glez. APNR   Diagnosis Other symptoms and signs involving the musculoskeletal system [R29.898]  Unspecified sequelae of cerebral infarction [I69.30]    Treatment Diagnosis Late effects of CVA   Date of Evaluation 21      Functional Outcome Measure Used Upper Extremity Functional Scale   Functional Outcome Score 42/80 (21)       Insurance: Primary: Payor: MEDICARE /  /  / ,   Secondary: [facility]   Authorization Information: Unlimited visits based on medical necessity, iontophoresis and hot/cold packs are not covered   Visit # 4, 4/10 for progress note   Visits Allowed: Unlimited visits based on medical necessity   Recertification Date:    Physician Follow-Up: Not stated   Physician Orders: Script dated 21 for eval and treat   Pertinent History: Patient reports she had a CVA around 2021 in which her speech was slurred. She states her right side strength and ROM was affected. Patient reports she was admitted to [facility] for about 4 days. Patient reports she had 3 weeks of home health therapy, PT, OT, ST.  Patient lists past medical history as heart attack, PACEMAKER, high blood pressure and stroke. SUBJECTIVE: Patient reports she went to have her hearing checked earlier this week. Patient reports she is off balance at times.     OBJECTIVE:    TREATMENT   Precautions: None per patient    Pain: Denies     X in shaded column indicates Activity Completed Today   Modalities Parameters/  Location  Notes/Comments                     Manual Therapy right fine motor coordination as demonstrated by completing 9 hole peg in 27 seconds for ease with writing. 2.  Patient will increase AROM right shoulder flexion to 155, extension to 50, abduction to 122, IR to 45 degrees for increased ease with doing her hair and dressing ease. 3.  Patient will increase right  strength to 27#, 3 point pinch to 9# and lateral pinch to 11# for increased ease with opening packages and jars. Time Frame: 8 weeks  1. Patient will improve UEFS to at least 52.  2.  Patient will report handwriting signature at least 80% of prior to CVA. 3.  Patient will be independent with UE finalized HEP for increased ease with homemaking and carrying groceries. Patient Education:   []  HEP/Education Completed: Plan of Care, Goals, fine motor coordination activities, AROM shoulder flexion, extension, IR, ER, abduction, submaximal shoulder isometrics with handouts given   [address] Access Code for HEP:   []  No new Education completed  [x]  Reviewed Prior HEP      [x]  Patient verbalized and/or demonstrated understanding of education provided. []  Patient unable to verbalize and/or demonstrate understanding of education provided. Will continue education. [x]  Barriers to learning: none    PLAN:  Treatment Recommendations: Strengthening, Range of Motion, Home Exercise Program and fine motor coordination    []  Plan of care initiated. Plan to see patient 2 times per week for 8 weeks to address the treatment planned outlined above. [x]  Continue with current plan of care  []  Modify plan of care as follows:    []  Hold pending physician visit  []  Discharge    Time In 0801   Time Out 0840   Timed Code Minutes: 39 min   Total Treatment Time: 39 min       Electronically Signed by:  BENJAMÍN Sarah/HAYDE #[id number]

## 2021-10-28 NOTE — PROGRESS NOTES
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 Visit # 3, 3/10 for progress note   Visits Allowed: Unlimited visits based on medical necessity   Recertification Date:    Physician Follow-Up: 21 f/u with MEGAN Early   Physician Orders: Evaluate and treat   Pertinent History: Patient reports she had a stroke last month in which her speech was slurred. Patient reports she was hospitalized to [facility] for about 4 days. She states she did receive speech therapy services during hospitalization. Patient reports she had 3 weeks of home health therapy, PT, OT, ST. Currently, paitent states she has noticed difficulty in her speech and ability to remember and form words. SUBJECTIVE: Patient pleasant and cooperative. Arrived alone for session. Reports she saw audiologist and found hearing loss in L and R ears. Has ENT appointment scheduled for 21. SHORT TERM GOAL #1:  Goal 1: Patient will complete immediate recall tasks with 5-6 units while implementing memory strategies with 80% accuracy with min cues to promote retention of novel information. INTERVENTIONS: Discussed patient's current performance with memory.  Reports when she's talking to somebody she \"just forgets how to do the words\". Endorses trouble \"finding the right words\". Reports using white board at home to track appointments and has placed all currently scheduled appointments on this board. When asked, patient stating her phone alarms have been alarming as scheduled during last visit with SLP. Patient reports taking medications immediately when alarm sounds. Explained consideration for planning ahead if knowing patient will be away from home during medication time and bringing medications along to ensure they are taken appropriately. SLP also tasked recall of previously trained [employer] memory strategies (write it down, repeat it, associate it, and picture it). Explained as 4 functional recall strategies, providing actual examples for when to utilize each, and patient with fair comprehension of information. SHORT TERM GOAL #2:  Goal 2: Patient will complete working memory tasks with 80% accuracy given min cues to improve mental flexibility and promote retention of information. INTERVENTIONS: Did not directly address this session. SHORT TERM GOAL #3:  Goal 3: Patient will complete higher level naming and sentence repetition tasks with 80% accuracy given min cues to promote successful communication exchanges across various settings. INTERVENTIONS: Naming exercises completed to promote word finding abilities for improved verbal fluency and sentence construction across multiple environments.    Divergent naming - 1 minute time parameter for concrete categories:  Fruits: 5 items named independently (apple, orange, banana, kiwi, pineapple)   *Discussed use of subcategories as well as visualization techniques to improve fluency of verbalizations  Vegetables: 6 items named independently (green beans, tomato, cucumber, lettuce, green onions, sofie greens)  [place]: 14 items named independently ([address], [facility], [facility], [facility], [place], [address], [place], [place], [address] [facility], [address] [place] [address], [place] [address], [facility]     Dio Sanchez #4:  Goal 4: Patient will complete divided and alteranting attention tasks with no more than 2 errors in a 5 minute time span to promote safe participation in complex cognitive activities (ex: driving). INTERVENTIONS: Did not directly address this session. SHORT TERM GOAL #5:  Goal 5: Patient will complete higher level reasoning and executive function tasks (medications, finances, scheduling) with 80% accuracy with min cues to promote safe participation in these activities. INTERVENTIONS: SLP provided patient with written word deduction reasoning task where patient provided with 4 descriptive words and tasked with identifying the item/object described by these words. Patient requires moderate assistance with initial trial, however then able to progress independently. Overall, patient performance is 10/14 independent, improving to 14/14 with min-moderate semantic or phonemic cues provided by SLP. Written deductive reasoning task completed where patient instructed to read all \"clues\" provided and complete chart with 10 pieces of information able to be located or deduced from content provided. Patient performance is 100% accurate given min cues to cross off content utilized. Discussed strategy of crossing off content already utilized to improve organization of thoughts and this progressed speed with completion. Overall patient required 10+ minutes to complete and patient rated task challenge as \"hard\". Provided additional similar exercise for home completion/practice. Long-term Goals  Timeframe for Long-term Goals: 4 weeks  Goal 1: Patient will improve FOM, NICOLE NOMs Memory score from a level 4 to a level 6 to independently use external memory strategies for completion of complex cognitive tasks. Assessment: Progressing toward goals. Agreeable to participation in interventions.   Specific Interventions Next Treatment: immediate recall, working memory, higher level naming, sentence repetition, divided and alternating attention, higher level reasoning and executive function tasks. Activity/Treatment Tolerance:  [x]  Patient tolerated treatment well  []  Patient limited by fatigue  []  Patient limited by pain   []  Patient limited by other medical complications  []  Other:     Patient Education:   [x]  HEP/Education Completed: Plan of Care, Goals, internal and external memory strategies  []  No new Education completed  []  Reviewed Prior HEP      [x]  Patient verbalized and/or demonstrated understanding of education provided. []  Patient unable to verbalize and/or demonstrate understanding of education provided. Will continue education. []  Barriers to learning:     PLAN:  []  Plan of care initiated. Plan to see patient 1 time per week for 4 weeks to address the treatment planned outlined above.   [x]  Continue with current plan of care  []  Modify plan of care as follows:    []  Hold pending physician visit  []  Discharge    Time In 0841   Time Out 0926   Timed Code Minutes: 45 min   Total Treatment Time: 39 min       Reena Woodard M.S., [address] [place] [address]

## 2021-11-08 ENCOUNTER — HOSPITAL ENCOUNTER (OUTPATIENT)
Dept: OCCUPATIONAL THERAPY | Age: 66
Setting detail: THERAPIES SERIES
Discharge: HOME | End: 2021-11-08
Payer: MEDICARE

## 2021-11-08 PROCEDURE — 97110 THERAPEUTIC EXERCISES: CPT

## 2021-11-08 NOTE — PROGRESS NOTES
[address] THERAPY  [] EVALUATION  [x] DAILY NOTE (LAND) [] DAILY NOTE (AQUATIC ) [] PROGRESS NOTE [] DISCHARGE NOTE    [] [address] [facility]   [] [address]    [] [address] [facility]   [x] [place]    Date: 2021  Patient Name:  Nidia Bullock  :   MRN: [id number]  CSN: [id number]    Referring Practitioner Keely US   Diagnosis  Other symptoms and signs involving the musculoskeletal system [R29.898]  Unspecified sequelae of cerebral infarction [I69.30]    Treatment Diagnosis Late effects of CVA   Date of Evaluation 21      Functional Outcome Measure Used Upper Extremity Functional Scale   Functional Outcome Score 42/80 (21)       Insurance: Primary: Payor: MEDICARE /  /  / ,   Secondary: [facility]   Authorization Information: Unlimited visits based on medical necessity, iontophoresis and hot/cold packs are not covered   Visit # 5, 5/10 for progress note   Visits Allowed: Unlimited visits based on medical necessity   Recertification Date:    Physician Follow-Up: Not stated   Physician Orders: Script dated 21 for eval and treat   Pertinent History: Patient reports she had a CVA around 2021 in which her speech was slurred. She states her right side strength and ROM was affected. Patient reports she was admitted to [facility] for about 4 days. Patient reports she had 3 weeks of home health therapy, PT, OT, ST.  Patient lists past medical history as heart attack, PACEMAKER, high blood pressure and stroke. SUBJECTIVE: Patient reports she sees an ear nose and throat doctor on Thursday. She states she gets dizziness at times. Patient reports she thinks her strength is getting better in her hand and arm. Patient reports she is going on a trip on  and will be out of town for a few months.      OBJECTIVE:    TREATMENT   Precautions: None per patient Pain: Denies     X in shaded column indicates Activity Completed Today   Modalities Parameters/  Location  Notes/Comments                     Manual Therapy Time/  Technique  Notes/Comments                     Exercises   Sets/  Sec Reps  Notes/Comments   Submaximal shoulder isometrics for flexion, extension, IR, ER and abduction 5 second hold 10 X Cues for technique   Orange putty for right , pinch and taffy pulls  4 minutes X    AROM B shoulder flexion, abduction 1 10 each X With mirror for visual feedback. Cues to avoid compensation at the shoulder. Biodex UBE at 80 RPM 3 minutes backward and 2 minutes forward  5 minutes X    Pulleys for shoulder flexion 1 15 X    Red flexbar for bends in supination/pronation and twists. Turning to simulate opening a jar 1 10 X           Activities Time    Notes/Comments   Fine motor coordination task:  Patient picked up handful of pennies and fed them one at a time into slotted canister for in hand manipulation 5 minutes  Several drops   Fine motor coordination task of finding and pulling out 12 pennies from orange putty. [employer] Pegboard for fine motor coordination task with right hand, placing pegs, washers and sleeves 8 minutes X In hand manipulation to take pegs out. Patient able to hold 15 pegs before dropping. Patient practiced writing her signature x 10 and months of the year 5 minutes  Handwriting task. Signature was more even today with patient reporting increased concentration. Pinch strength:  Patient pinched yellow, red, green, blue and black clothespins on vertical pole with right hand for pinch  strengthening. X Used 3 point pinch and lateral pinch. Nice shoulder reaching noted.      Specific Interventions Next Treatment: UE ROM, Strength,  and pinch strength, fine motor coordination    Activity/Treatment Tolerance:  [x]  Patient tolerated treatment well  []  Patient limited by fatigue  []  Patient limited by pain   [] Patient limited by other medical complications  []  Other:     Assessment: Patient is progressing towards her goals. Areas for Improvement: impaired coordination, impaired ROM and impaired strength  Prognosis: good    GOALS:  Patient Goal: improve writing, speech and strength    Short Term Goals:  Time Frame: 5 weeks  1. Patient will improve right fine motor coordination as demonstrated by completing 9 hole peg in 27 seconds for ease with writing. 2.  Patient will increase AROM right shoulder flexion to 155, extension to 50, abduction to 122, IR to 45 degrees for increased ease with doing her hair and dressing ease. 3.  Patient will increase right  strength to 27#, 3 point pinch to 9# and lateral pinch to 11# for increased ease with opening packages and jars. Time Frame: 8 weeks  1. Patient will improve UEFS to at least 52.  2.  Patient will report handwriting signature at least 80% of prior to CVA. 3.  Patient will be independent with UE finalized HEP for increased ease with homemaking and carrying groceries. Patient Education:   []  HEP/Education Completed: Plan of Care, Goals, fine motor coordination activities, AROM shoulder flexion, extension, IR, ER, abduction, submaximal shoulder isometrics with handouts given   [address] Access Code for HEP:   []  No new Education completed  [x]  Reviewed Prior HEP      [x]  Patient verbalized and/or demonstrated understanding of education provided. []  Patient unable to verbalize and/or demonstrate understanding of education provided. Will continue education. [x]  Barriers to learning: none    PLAN:  Treatment Recommendations: Strengthening, Range of Motion, Home Exercise Program and fine motor coordination    []  Plan of care initiated. Plan to see patient 2 times per week for 8 weeks to address the treatment planned outlined above.   [x]  Continue with current plan of care  []  Modify plan of care as follows:    []  Hold pending physician visit  []  Discharge    Time In 0917   Time Out 0959   Timed Code Minutes: 42 min   Total Treatment Time: 42 min       Electronically Signed by:  Harpreet Rodriguez OTR/HAYDE #[id number]

## 2021-11-11 ENCOUNTER — HOSPITAL ENCOUNTER (OUTPATIENT)
Dept: SPEECH THERAPY | Age: 66
Setting detail: THERAPIES SERIES
Discharge: HOME | End: 2021-11-11
Payer: MEDICARE

## 2021-11-11 ENCOUNTER — HOSPITAL ENCOUNTER (OUTPATIENT)
Dept: OCCUPATIONAL THERAPY | Age: 66
Setting detail: THERAPIES SERIES
Discharge: HOME | End: 2021-11-11
Payer: MEDICARE

## 2021-11-11 ENCOUNTER — OFFICE VISIT (OUTPATIENT)
Dept: ENT CLINIC | Age: 66
End: 2021-11-11
Payer: MEDICARE

## 2021-11-11 VITALS
HEART RATE: 70 BPM | DIASTOLIC BLOOD PRESSURE: 70 MMHG | HEIGHT: 63 IN | RESPIRATION RATE: 14 BRPM | TEMPERATURE: 97.5 F | SYSTOLIC BLOOD PRESSURE: 110 MMHG | WEIGHT: 167.5 LBS | BODY MASS INDEX: 29.68 KG/M2 | OXYGEN SATURATION: 96 %

## 2021-11-11 DIAGNOSIS — H90.3 SENSORINEURAL HEARING LOSS (SNHL) OF BOTH EARS: ICD-10-CM

## 2021-11-11 DIAGNOSIS — H73.891 CRUSTED TYMPANIC MEMBRANE OF RIGHT EAR: ICD-10-CM

## 2021-11-11 DIAGNOSIS — H90.11 CONDUCTIVE HEARING LOSS OF RIGHT EAR WITH UNRESTRICTED HEARING OF LEFT EAR: Primary | ICD-10-CM

## 2021-11-11 DIAGNOSIS — H61.21 IMPACTED CERUMEN OF RIGHT EAR: ICD-10-CM

## 2021-11-11 PROCEDURE — 69210 REMOVE IMPACTED EAR WAX UNI: CPT | Performed by: OTOLARYNGOLOGY

## 2021-11-11 PROCEDURE — 1090F PRES/ABSN URINE INCON ASSESS: CPT | Performed by: OTOLARYNGOLOGY

## 2021-11-11 PROCEDURE — G8427 DOCREV CUR MEDS BY ELIG CLIN: HCPCS | Performed by: OTOLARYNGOLOGY

## 2021-11-11 PROCEDURE — G8484 FLU IMMUNIZE NO ADMIN: HCPCS | Performed by: OTOLARYNGOLOGY

## 2021-11-11 PROCEDURE — 99203 OFFICE O/P NEW LOW 30 MIN: CPT | Performed by: OTOLARYNGOLOGY

## 2021-11-11 PROCEDURE — 97130 THER IVNTJ EA ADDL 15 MIN: CPT

## 2021-11-11 PROCEDURE — 97110 THERAPEUTIC EXERCISES: CPT

## 2021-11-11 PROCEDURE — G8400 PT W/DXA NO RESULTS DOC: HCPCS | Performed by: OTOLARYNGOLOGY

## 2021-11-11 PROCEDURE — G8417 CALC BMI ABV UP PARAM F/U: HCPCS | Performed by: OTOLARYNGOLOGY

## 2021-11-11 PROCEDURE — 4004F PT TOBACCO SCREEN RCVD TLK: CPT | Performed by: OTOLARYNGOLOGY

## 2021-11-11 PROCEDURE — 1123F ACP DISCUSS/DSCN MKR DOCD: CPT | Performed by: OTOLARYNGOLOGY

## 2021-11-11 PROCEDURE — 3017F COLORECTAL CA SCREEN DOC REV: CPT | Performed by: OTOLARYNGOLOGY

## 2021-11-11 PROCEDURE — 97129 THER IVNTJ 1ST 15 MIN: CPT

## 2021-11-11 PROCEDURE — 4040F PNEUMOC VAC/ADMIN/RCVD: CPT | Performed by: OTOLARYNGOLOGY

## 2021-11-11 ASSESSMENT — ENCOUNTER SYMPTOMS
FACIAL SWELLING: 0
VOICE CHANGE: 0
SORE THROAT: 0
RHINORRHEA: 0
STRIDOR: 0
COUGH: 0
SHORTNESS OF BREATH: 0
NAUSEA: 0
CHOKING: 0
SINUS PRESSURE: 0
COLOR CHANGE: 0
ABDOMINAL PAIN: 0
VOMITING: 0
CHEST TIGHTNESS: 0
APNEA: 0
WHEEZING: 0
DIARRHEA: 0
TROUBLE SWALLOWING: 0

## 2021-11-11 NOTE — PROGRESS NOTES
** PLEASE SIGN, DATE AND TIME CERTIFICATION BELOW AND RETURN TO [facility] OUTPATIENT REHABILITATION (FAX #: [phone]). ATTEST/CO-SIGN IF ACCESSING VIA [employer]. THANK YOU.**    I certify that I have examined the patient below and determined that Physical Medicine and Rehabilitation service is necessary and that I approve the established plan of care for up to 90 days or as specifically noted. Attestation, signature or co-signature of physician indicates approval of certification requirements.    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 Visit # 4, 4/10 for progress note   Visits Allowed: Unlimited visits based on medical necessity   Recertification Date: 54   Physician Follow-Up: 21 f/u with JANIA Berkowitz-CNP   Physician Orders: Evaluate and treat   Pertinent History: Patient reports she had a stroke last month in which her speech was slurred. Patient reports [place] [facility]. She states she did receive speech therapy services during hospitalization.  Patient reports she had 3 weeks of home health therapy, PT, OT, ST. Currently, eriberto states she has noticed difficulty in her speech and ability to remember and form words. SUBJECTIVE: Patient pleasant and cooperative. Arrived alone for session. SLP inquired about how she feels her cognition is functioning for her, and patient stating \"It's still difficult to remember what I'm gonna say\". When asking for clarification, patient endorsing some word retrieval difficulties vs difficulty maintaining her train of thought. Progress note completed today for additional certification to continue SLP services. Cognitive-Log administered this session to assess current level of functioning. Patient's total score is 26/30 with noted impairments/difficulty in verbal immediate recall, minor impairment in 30 second estimation task, and minor impairment in selective attention task. SHORT TERM GOAL #1:  Goal 1: Patient will complete immediate recall tasks with 5-6 units while implementing memory strategies with 80% accuracy with min cues to promote retention of novel information. GOAL NOT MET. CONTINUE. INTERVENTIONS: Patient reporting fair success at home with functional working memory tasks. During Cog-Log administration, patient with difficulty during completion of verbal immediate recall task. Completed recall for previously trained memory strategies. Patient unable to recall any strategies independently. Given min cues and functional examples, patient able to recall \"write it down\" strategy. Also attempted recall given mod-max cues and verbal descriptions and examples for additional strategies (e.g., repetition, association, and visualization) however patient unable to recall any additional techniques. PREVIOUS SESSION:   - Discussed patient's current performance with memory. Reports when she's talking to somebody she \"just forgets how to do the words\". Endorses trouble \"finding the right words\".    - Reports using white board at home to track appointments and has placed all currently scheduled appointments on this board. When asked, patient stating her phone alarms have been alarming as scheduled during last visit with SLP. Patient reports taking medications immediately when alarm sounds. - Explained consideration for planning ahead if knowing patient will be away from home during medication time and bringing medications along to ensure they are taken appropriately.   - SLP also tasked recall of previously trained WRAP memory strategies (write it down, repeat it, associate it, and picture it). Explained as 4 functional recall strategies, providing actual examples for when to utilize each, and patient with fair comprehension of information. SHORT TERM GOAL #2:  Goal 2: Patient will complete working memory tasks with 80% accuracy given min cues to improve mental flexibility and promote retention of information. GOAL NOT MET. CONTINUE GOAL. INTERVENTIONS: Working memory addressed with SLP verbally presenting 4 units of information and tasking patient with manipulating content and providing appropriate response related to word attributes/characteristics (e.g., horse, banana, elephant, wall, - which ones are animals?). Patient performance is 3/5 independent, 2/5 requiring stimulus repetitions. SHORT TERM GOAL #3:  Goal 3: Patient will complete higher level naming and sentence repetition tasks with 80% accuracy given min cues to promote successful communication exchanges across various settings. GOAL NOT MET. CONTINUE GOAL. INTERVENTIONS: Patient endorsing continued impairments in lexical retrieval. Continue goal for further skill development and potential word finding strategy training. PREVIOUS SESSION:  Naming exercises completed to promote word finding abilities for improved verbal fluency and sentence construction across multiple environments.    Divergent naming - 1 minute time parameter for concrete categories:  Fruits: 5 items named independently (apple, orange, banana, kiwi, pineapple)   *Discussed use of subcategories as well as visualization techniques to improve fluency of verbalizations  Vegetables: 6 items named independently (green beans, tomato, cucumber, lettuce, green onions, sofie greens)  [place] States: 14 items named independently ([address], [place], [place], [place], [place], [address], [place], [place], [place], [place], [place], [address] [facility], [facility], [place])     [address] #4:  Goal 4: Patient will complete divided and alteranting attention tasks with no more than 2 errors in a 5 minute time span to promote safe participation in complex cognitive activities (ex: driving). GOAL NOT MET. CONTINUE GOAL. INTERVENTIONS: Due to limited sessions since initiating plan of treatment, goal has not been addressed. Continue goal for ongoing treatment. SHORT TERM GOAL #5:  Goal 5: Patient will complete higher level reasoning and executive function tasks (medications, finances, scheduling) with 80% accuracy with min cues to promote safe participation in these activities. GOAL NOT MET. CONTINUE GOAL. INTERVENTIONS: SLP provided patient with deduction by exclusion / deductive reasoning written exercise. Patient stating, \"Wow this is hard! \". Content includes using a calendar to eliminate all dates until finding the one specific date, and information includes terminology such as \"divisible by\" and \"multiple of\" which patient stated, \"I don't get\". SLP providing definition of \"multiple\" to improve comprehension and ability to complete task. SLP assisting step by step for calculating multiples of 4. Also assisting with progressing through entire task due to patient with difficulty and omissions of information independently. PREVIOUS SESSION:  - SLP provided patient with written word deduction reasoning task where patient provided with 4 descriptive words and tasked with identifying the item/object described by these words.  Patient requires moderate assistance with initial trial, however then able to progress independently. Overall, patient performance is 10/14 independent, improving to 14/14 with min-moderate semantic or phonemic cues provided by SLP. - Written deductive reasoning task completed where patient instructed to read all \"clues\" provided and complete chart with 10 pieces of information able to be located or deduced from content provided. Patient performance is 100% accurate given min cues to cross off content utilized. Discussed strategy of crossing off content already utilized to improve organization of thoughts and this progressed speed with completion. Overall patient required 10+ minutes to complete and patient rated task challenge as \"hard\". Provided additional similar exercise for home completion/practice. Long-term Goals  Timeframe for Long-term Goals: 4 weeks  Goal 1: Patient will improve FOM, NICOLE NOMs Memory score from a level 4 to a level 6 to independently use external memory strategies for completion of complex cognitive tasks. GOAL NOT MET. CONTINUE. Assessment: Progressing toward goals. The patient has not yet many any goals this treatment period, mostly due to limited time spent addressing them due to only having 3 treatment sessions this reporting period. She remains with at least mild cognitive impairments in all areas in which short-term goals are addressing. Recommending continued speech therapy services to address these goals to promote patient's ability to successfully and independently participate in all IADLs across multiple environments. Specific Interventions Next Treatment: immediate recall, working memory, higher level naming, sentence repetition, divided and alternating attention, higher level reasoning and executive function tasks.      Activity/Treatment Tolerance:  [x]  Patient tolerated treatment well  []  Patient limited by fatigue  []  Patient limited by pain   []  Patient limited by other medical complications  []  Other:     Patient Education:   [x]  HEP/Education Completed: Plan of Care, Goals, internal and external memory strategies  []  No new Education completed  []  Reviewed Prior HEP      [x]  Patient verbalized and/or demonstrated understanding of education provided. []  Patient unable to verbalize and/or demonstrate understanding of education provided. Will continue education. []  Barriers to learning:     PLAN:  []  Plan of care initiated. Plan to see patient 1 time per week for 4 weeks to address the treatment planned outlined above.   [x]  Continue with current plan of care  []  Modify plan of care as follows:    []  Hold pending physician visit  []  Discharge    Time In 0827   Time Out 0915   Timed Code Minutes: 48 min   Total Treatment Time: [address] min       Virginia Gonzales M.S., [address] [place] [address]

## 2021-11-11 NOTE — PROGRESS NOTES
[address] THERAPY  [] EVALUATION  [x] DAILY NOTE (LAND) [] DAILY NOTE (AQUATIC ) [] PROGRESS NOTE [] DISCHARGE NOTE    [] OUTPATIENT REHABILITATION [facility]   [] [address]     [] [address] [place]   [x] Hipolito Stevenson    Date: 2021  Patient Name:  Ekta Ramos  :   MRN: [id number]  CSN: [id number]    Referring Practitioner Travon CARTERNR   Diagnosis Other symptoms and signs involving the musculoskeletal system [R29.898]  Unspecified sequelae of cerebral infarction [I69.30]    Treatment Diagnosis Late effects of CVA   Date of Evaluation 21      Functional Outcome Measure Used Upper Extremity Functional Scale   Functional Outcome Score 42/80 (21)       Insurance: Primary: Payor: MEDICARE /  /  / ,   Secondary: [facility]   Authorization Information: Unlimited visits based on medical necessity, iontophoresis and hot/cold packs are not covered   Visit # 6, 6/10 for progress note   Visits Allowed: Unlimited visits based on medical necessity   Recertification Date:    Physician Follow-Up: Not stated   Physician Orders: Script dated 21 for eval and treat   Pertinent History: Patient reports she had a CVA around 2021 in which her speech was slurred. She states her right side strength and ROM was affected. Patient reports she was admitted to [facility] for about 4 days. Patient reports she had 3 weeks of home health therapy, PT, OT, ST.  Patient lists past medical history as heart attack, PACEMAKER, high blood pressure and stroke. SUBJECTIVE: Patient states she sees the ENT doctor today.      OBJECTIVE:    TREATMENT   Precautions: None per patient    Pain: Denies     X in shaded column indicates Activity Completed Today   Modalities Parameters/  Location  Notes/Comments                     Manual Therapy Time/  Technique  Notes/Comments                     Exercises   Sets/  Sec Reps  Notes/Comments   Submaximal shoulder isometrics for flexion, extension, IR, ER and abduction 5 second hold 10  Cues for technique   Orange putty for right , pinch and taffy pulls  4 minutes X    AROM B shoulder flexion, abduction 1 10 each X With mirror for visual feedback. 1# weight   Biodex UBE at 80 RPM 3 minutes backward and 2 minutes forward  5 minutes X    Pulleys for shoulder flexion 1 15     Red flexbar for bends in supination/pronation and twists. Turning to simulate opening a jar 1 15 each X    Green digit flex for individual finger flexion and combined gripping 1 10 X                  Orange theraband for shoulder flexion, extension, IR, ER and rows       Activities Time    Notes/Comments   Fine motor coordination task:  Patient picked up handful of pennies and fed them one at a time into slotted canister for in hand manipulation 5 minutes X Several drops   Fine motor coordination task of finding and pulling out 12 pennies from orange putty. [employer] Pegboard for fine motor coordination task with right hand, placing pegs, washers and sleeves 8 minutes  In hand manipulation to take pegs out. Patient able to hold 15 pegs before dropping. Patient practiced writing her signature x 10 and months of the year 4 minutes X Handwriting task. Signature started to move up at end after repeated writing. Pinch strength:  Patient pinched yellow, red, green, blue and black clothespins on vertical pole with right hand for pinch  strengthening. Used 3 point pinch and lateral pinch. Nice shoulder reaching noted. Specific Interventions Next Treatment: UE ROM, Strength,  and pinch strength, fine motor coordination    Activity/Treatment Tolerance:  [x]  Patient tolerated treatment well  []  Patient limited by fatigue  []  Patient limited by pain   []  Patient limited by other medical complications  []  Other:     Assessment: Patient is progressing towards her goals.     Areas for Improvement: impaired coordination, impaired ROM and impaired strength  Prognosis: good    GOALS:  Patient Goal: improve writing, speech and strength    Short Term Goals:  Time Frame: 5 weeks  1. Patient will improve right fine motor coordination as demonstrated by completing 9 hole peg in 27 seconds for ease with writing. 2.  Patient will increase AROM right shoulder flexion to 155, extension to 50, abduction to 122, IR to 45 degrees for increased ease with doing her hair and dressing ease. 3.  Patient will increase right  strength to 27#, 3 point pinch to 9# and lateral pinch to 11# for increased ease with opening packages and jars. Time Frame: 8 weeks  1. Patient will improve UEFS to at least 52.  2.  Patient will report handwriting signature at least 80% of prior to CVA. 3.  Patient will be independent with UE finalized HEP for increased ease with homemaking and carrying groceries. Patient Education:   [x]  HEP/Education Completed: Plan of Care, Goals, fine motor coordination activities, AROM shoulder flexion, extension, IR, ER, abduction, submaximal shoulder isometrics with handouts given   [employer] Access Code for HEP: Access Code: 11/11/21: [email or web address]   URL: [employer]/   Date: 11/11/2021   Prepared by: Geno Acevedo      Exercises   Standing Shoulder Row with Anchored Resistance - 1-2 x daily - 7 x weekly - 1 sets - 10 reps   Shoulder Extension with Resistance - 1-2 x daily - 7 x weekly - 1 sets - 10 reps   Shoulder External Rotation with Anchored Resistance - 1-2 x daily - 7 x weekly - 1 sets - 10 reps   Shoulder Internal Rotation with Resistance - 1-2 x daily - 7 x weekly - 1 sets - 10 reps   Standing Shoulder Flexion with Posterior Anchored Resistance - 1-2 x daily - 7 x weekly - 1 sets - 10 reps     []  No new Education completed  []  Reviewed Prior HEP      [x]  Patient verbalized and/or demonstrated understanding of education provided.   []  Patient unable to verbalize and/or demonstrate understanding of education provided. Will continue education. [x]  Barriers to learning: none    PLAN:  Treatment Recommendations: Strengthening, Range of Motion, Home Exercise Program and fine motor coordination    []  Plan of care initiated. Plan to see patient 2 times per week for 8 weeks to address the treatment planned outlined above. [x]  Continue with current plan of care  []  Modify plan of care as follows:    []  Hold pending physician visit  []  Discharge    Time In 0919   Time Out 0959   Timed Code Minutes: 40 min   Total Treatment Time: 40 min       Electronically Signed by:  Connie Maradiaga OTR/L #[id number]

## 2021-11-11 NOTE — PROGRESS NOTES
[facility] [place] [facility]  [facility] NOSE [facility]  [facility]  Dept: [phone]  Dept Fax: [phone]  Loc: [phone]    Mary Jo Marie is a 78 y.o. female who was referred JANIA Cabezas -* for:  Chief Complaint   Patient presents with    Ear Problem     New patient is here for right ETD ref by Terra Mcdowell CNP    .    HPI:     Mary Jo Marie is a 78 y.o. female who presents today for Evaluation of right Eustachian tube dysfunction. Referred by Terra Mcdowell. AUDIOGRAM            Audiogram/Tympanogram:     COMMENTS: Mild sloping to moderately-severe sensorineural hearing loss in the left ear and mild to profound mixed hearing loss in the right ear. Speech reception thresholds consistent with pure tone findings, bilaterally. Word recognition scores are excellent, bilaterally, with speech presented at slightly elevated levels in quiet. Tympanometry revealed normal peak pressure and normal middle ear compliance for the left ear. The right tympanogram showed a normal ear canal volume with no measurable compliance, suggesting possible middle ear dysfunction. RECOMMENDATION(S):  1. Follow up with referring provider as planned. 2. Consider ENT evaluation due to abnormal tympanogram and conductive component in the right ear. 3. Repeat testing following medical management or in 6-12 months to rule out progression. Mild conductive loss right ear. [place] low frequency hearing sloping to mild to moderate sensorineural hearing loss in the high frequencies. In August she was swimming and couldn't hear and still cant. Got water in her ear and infection and drainage. She had ear drops but did not taste them when she used them. No throat issues, no problems breathing through nose. She smokes.       History:     No Known Allergies  Current Outpatient Medications   Medication Sig Dispense Refill    fluticasone (FLONASE) 50 MCG/ACT nasal spray 2 sprays by Nasal route 2 times daily 3 each 3    sertraline (ZOLOFT) 50 MG tablet TAKE ONE TABLET BY MOUTH EVERY DAY 90 tablet 3    pramipexole (MIRAPEX) 0.5 MG tablet TAKE ONE TABLET BY MOUTH TWICE A  tablet 3    clopidogrel (PLAVIX) 75 MG tablet TAKE 1 TABLET BY MOUTH EVERY DAY 90 tablet 3    apixaban (ELIQUIS) 5 MG TABS tablet TAKE 1 TABLET BY MOUTH TWICE A  tablet 3    amiodarone (CORDARONE) 200 MG tablet Take 1 tablet by mouth daily      isosorbide mononitrate (IMDUR) 30 MG extended release tablet Take 1 tablet by mouth daily      melatonin 3 MG TABS tablet Take 1 tablet by mouth nightly as needed      nitroGLYCERIN (NITROSTAT) 0.4 MG SL tablet Place 1 tablet under the tongue as needed      ranolazine (RANEXA) 500 MG extended release tablet Take 1 tablet by mouth 2 times daily      ticagrelor (BRILINTA) 90 MG TABS tablet Take 1 tablet by mouth 2 times daily      atorvastatin (LIPITOR) 80 MG tablet Take 1 tablet by mouth daily      metoprolol tartrate (LOPRESSOR) 25 MG tablet TAKE 1 TABLET BY MOUTH TWICE A  tablet 3    rOPINIRole (REQUIP) 3 MG tablet TAKE ONE TABLET BY MOUTH EVERY DAY AT NIGHT 90 tablet 3    Multiple Vitamins-Minerals (MULTI-VITAMIN GUMMIES PO) Take by mouth      aspirin 81 MG tablet Take 1 tablet by mouth daily 90 tablet 3     No current facility-administered medications for this visit.      Past Medical History:   Diagnosis Date    Coronary artery disease involving native coronary artery of native heart without angina pectoris 9/30/2016    Pure hypercholesterolemia 9/30/2016      Past Surgical History:   Procedure Laterality Date    CORONARY ANGIOPLASTY WITH STENT PLACEMENT      right main done in [address]      partial    LUNG BIOPSY      TRANSTHORACIC ECHOCARDIOGRAM  10/31/2016     Family History   Problem Relation Age of Onset    Cancer Mother         lung cancer  Other Father         pneumonia    Other Sister         alcohol poisoning   [place] Cancer Sister         female organs     Social History     Tobacco Use    Smoking status: Current Every Day Smoker     Packs/day: 0.50     Years: 52.00     Pack years: 26.00     Types: Cigarettes     Start date: 12/6/1966    Smokeless tobacco: Never Used   Substance Use Topics    Alcohol use: Yes     Alcohol/week: 5.0 - 6.0 standard drinks     Types: 5 - 6 Cans of beer per week       Subjective:       Review of Systems   Constitutional: Negative for activity change, appetite change, chills, diaphoresis, fatigue, fever and unexpected weight change. HENT: Negative for congestion, dental problem, ear discharge, ear pain, facial swelling, hearing loss, mouth sores, nosebleeds, postnasal drip, rhinorrhea, sinus pressure, sneezing, sore throat, tinnitus, trouble swallowing and voice change. Eyes: Negative for visual disturbance. Respiratory: Negative for apnea, cough, choking, chest tightness, shortness of breath, wheezing and stridor. Cardiovascular: Negative for chest pain, palpitations and leg swelling. Gastrointestinal: Negative for abdominal pain, diarrhea, nausea and vomiting. Endocrine: Negative for cold intolerance, heat intolerance, polydipsia and polyuria. Genitourinary: Negative for dysuria, enuresis and hematuria. Musculoskeletal: Negative for arthralgias, gait problem, neck pain and neck stiffness. Skin: Negative for color change and rash. Allergic/Immunologic: Negative for environmental allergies, food allergies and immunocompromised state. Neurological: Negative for dizziness, syncope, facial asymmetry, speech difficulty, light-headedness and headaches. Hematological: Negative for adenopathy. Does not bruise/bleed easily. Psychiatric/Behavioral: Negative for confusion and sleep disturbance. The patient is not nervous/anxious.         Objective:     /70 (Site: Left Upper Arm, Position: Sitting)   Pulse 70   Temp 97.5 °F (36.4 °C) (Infrared)   Resp 14   Ht 5' 3\" (1.6 m)   Wt 167 lb 8 oz (76 kg)   SpO2 96%   BMI 29.67 kg/m²     Physical Exam  Vitals and nursing note reviewed. Constitutional:       Appearance: She is well-developed. HENT:      Head: Normocephalic and atraumatic. No laceration. Comments:        Right Ear: Hearing, ear canal and external ear normal. No drainage or swelling. No middle ear effusion. Tympanic membrane is not perforated or erythematous. Left Ear: Hearing, tympanic membrane, ear canal and external ear normal. No drainage or swelling. No middle ear effusion. Tympanic membrane is not perforated or erythematous. Nose: Nose normal. No septal deviation, mucosal edema or rhinorrhea. Mouth/Throat:      Mouth: Mucous membranes are not pale and not dry. No oral lesions. Pharynx: Oropharynx is clear. Uvula midline. No oropharyngeal exudate or posterior oropharyngeal erythema. Comments: LIps: lips normal     Mallampati 1  Base of tongue: symmetric,  Eyes:      Extraocular Movements:      Right eye: Normal extraocular motion and no nystagmus. Left eye: Normal extraocular motion and no nystagmus. Comments: Conjugate gaze   Neck:      Thyroid: No thyroid mass or thyromegaly. Trachea: Phonation normal. No tracheal deviation. Comments:   Salivary glands not enlarged and normal to palpation    Pulmonary:      Effort: Pulmonary effort is normal. No retractions. Breath sounds: No stridor. Musculoskeletal:      Cervical back: Neck supple. Neurological:      Mental Status: She is alert and oriented to person, place, and time. Cranial Nerves: No cranial nerve deficit (VIIth N function intact bilat). Psychiatric:         Mood and Affect: Mood and affect normal.         Behavior: Behavior is cooperative.        Cerumen removal using operating microscope, RIGHT  Under the operating microscope, the right ear was cleaned with wire loop, forceps and suction as needed. Patient tolerated it well. Findings: He has a very hard adherent cerumen impaction extending onto the drum, thickened ear wax. Could not remove all of it, without causing pain and bleeding. Most medial portion remains    Data:  All of the past medical history, past surgical history, family history,social history, allergies and current medications were reviewed with the patient. Assessment & Plan   Diagnoses and all orders for this visit:     Diagnosis Orders   1. Conductive hearing loss of right ear with bilateral mild symmetrical high-frequency sensorineural  carbamide peroxide (DEBROX) 6.5 % otic solution   2. Sensorineural hearing loss (SNHL) of both ears     3. Impacted cerumen of right ear  ND REMOVAL IMPACTED CERUMEN INSTRUMENTATION UNILAT    carbamide peroxide (DEBROX) 6.5 % otic solution   4. Crusted tympanic membrane of right ear  carbamide peroxide (DEBROX) 6.5 % otic solution       The findings were explained and her questions were answered. Her hearing may be symmetrical with a conductive loss due to the cerumen impaction that is very medial and difficult to see with a hand otoscope. Options were discussed including ordering Debrox & following up in 12 days. She agreed. 12 day follow up Cerumen impaction Right ear / Ear cleaning. I, Manuela Farley CMA ([facility]), am scribing for, and in the presence of Dr. Judith Goodell. Electronically signed by Rose Vegas CMA ([facility]) on 11/11/21 at 2:17 PM EST. (Please note that portions of this note were completed with a voice recognition program. Efforts were made to edit the dictations butoccasionally words are mis-transcribed.)    I agree to the above documentation placed by my scribe. I have personally evaluated this patient. Additional findings are as noted. I reviewed the scribe's note and agree with the documented findings and plan of care. Any areas of disagreement are corrected.  I agree with the chief complaint, past medical history, past surgical history, allergies, medications, social and family history as documented unless otherwise noted below.      Electronically signed by Lisa Loza MD on 11/27/2021 at 12:09 PM

## 2021-11-15 ENCOUNTER — HOSPITAL ENCOUNTER (OUTPATIENT)
Dept: OCCUPATIONAL THERAPY | Age: 66
Setting detail: THERAPIES SERIES
Discharge: HOME | End: 2021-11-15
Payer: MEDICARE

## 2021-11-15 PROCEDURE — 97110 THERAPEUTIC EXERCISES: CPT

## 2021-11-15 NOTE — PROGRESS NOTES
[address] THERAPY  [] EVALUATION  [x] DAILY NOTE (LAND) [] DAILY NOTE (AQUATIC ) [] PROGRESS NOTE [] DISCHARGE NOTE    [] [address] - [place]   [] [address]    [] [address] [employer]   [x] Pool Puri    Date: 11/15/2021  Patient Name:  Jamey Chávez  :   MRN: [id number]  CSN: [id number]    Referring Practitioner Ilsa US   Diagnosis  Other symptoms and signs involving the musculoskeletal system [R29.898]  Unspecified sequelae of cerebral infarction [I69.30]    Treatment Diagnosis Late effects of CVA   Date of Evaluation 21      Functional Outcome Measure Used Upper Extremity Functional Scale   Functional Outcome Score 42/80 (21)       Insurance: Primary: Payor: MEDICARE /  /  / ,   Secondary: [facility]   Authorization Information: Unlimited visits based on medical necessity, iontophoresis and hot/cold packs are not covered   Visit # 7, 7/10 for progress note   Visits Allowed: Unlimited visits based on medical necessity   Recertification Date:    Physician Follow-Up: Not stated   Physician Orders: Script dated 21 for eval and treat   Pertinent History: Patient reports she had a CVA around 2021 in which her speech was slurred. She states her right side strength and ROM was affected. Patient reports she was admitted to [facility] for about 4 days. Patient reports she had 3 weeks of home health therapy, PT, OT, ST.  Patient lists past medical history as heart attack, PACEMAKER, high blood pressure and stroke. SUBJECTIVE: Patient states she has some skin growing over her eardrum.      OBJECTIVE:    TREATMENT   Precautions: None per patient    Pain: Denies     X in shaded column indicates Activity Completed Today   Modalities Parameters/  Location  Notes/Comments                     Manual Therapy Time/  Technique  Notes/Comments Exercises   Sets/  Sec Reps  Notes/Comments   Submaximal shoulder isometrics for flexion, extension, IR, ER and abduction 5 second hold 10 X Cues for technique   Orange putty for right , pinch and taffy pulls  3 minutes X    AROM B shoulder flexion, abduction 1 10 each X With mirror for visual feedback. 1# weight   Biodex UBE at 75 RPM 3 minutes backward and 2 minutes forward  5 minutes X 1 cue to reverse directions   Pulleys for shoulder flexion 1 15 X    Red flexbar for bends in supination/pronation and twists. Turning to simulate opening a jar 1 15 each X Completed 5 reps each with green flexbar   Green digit flex for individual finger flexion and combined gripping 1 10 X    Wall flexion and scaption slides 5 second hold 10 each X           Orange theraband for shoulder flexion, extension, IR, ER and rows 2 sets 10 X    Activities Time    Notes/Comments   Fine motor coordination task:  Patient picked up handful of pennies and fed them one at a time into slotted canister for in hand manipulation 5 minutes  Several drops   Fine motor coordination task of finding and pulling out 12 pennies from orange putty. Purdue Pegboard for fine motor coordination task with right hand, placing pegs, washers and sleeves 6 minutes X In hand manipulation to place pegs in board. Several drops with the washers. Patient practiced writing her signature x 10 and months of the year 4 minutes  Handwriting task. Signature started to move up at end after repeated writing. Pinch strength:  Patient pinched yellow, red, green, blue and black clothespins on vertical pole with right hand for pinch  strengthening. Used 3 point pinch and lateral pinch. Nice shoulder reaching noted.      Specific Interventions Next Treatment: UE ROM, Strength,  and pinch strength, fine motor coordination    Activity/Treatment Tolerance:  [x]  Patient tolerated treatment well  []  Patient limited by fatigue  []  Patient limited by pain   []  Patient limited by other medical complications  []  Other:     Assessment: Patient is progressing towards her goals. Minimal difficulty with fine motor pegboard task. Areas for Improvement: impaired coordination, impaired ROM and impaired strength  Prognosis: good    GOALS:  Patient Goal: improve writing, speech and strength    Short Term Goals:  Time Frame: 5 weeks  1. Patient will improve right fine motor coordination as demonstrated by completing 9 hole peg in 27 seconds for ease with writing. 2.  Patient will increase AROM right shoulder flexion to 155, extension to 50, abduction to 122, IR to 45 degrees for increased ease with doing her hair and dressing ease. 3.  Patient will increase right  strength to 27#, 3 point pinch to 9# and lateral pinch to 11# for increased ease with opening packages and jars. Time Frame: 8 weeks  1. Patient will improve UEFS to at least 52.  2.  Patient will report handwriting signature at least 80% of prior to CVA. 3.  Patient will be independent with UE finalized HEP for increased ease with homemaking and carrying groceries. Patient Education:   [x]  HEP/Education Completed: Plan of Care, Goals, fine motor coordination activities, AROM shoulder flexion, extension, IR, ER, abduction, submaximal shoulder isometrics with handouts given   [employer] Access Code for HEP: Access Code: 11/11/21: [email or web address]   URL: [email or web address]   Date: 11/11/2021   Prepared by:  Stacy Ramires      Exercises   Standing Shoulder Row with Anchored Resistance - 1-2 x daily - 7 x weekly - 1 sets - 10 reps   Shoulder Extension with Resistance - 1-2 x daily - 7 x weekly - 1 sets - 10 reps   Shoulder External Rotation with Anchored Resistance - 1-2 x daily - 7 x weekly - 1 sets - 10 reps   Shoulder Internal Rotation with Resistance - 1-2 x daily - 7 x weekly - 1 sets - 10 reps   Standing Shoulder Flexion with Posterior Anchored Resistance - 1-2 x daily - 7 x weekly - 1 sets - 10 reps     []  No new Education completed  []  Reviewed Prior HEP      [x]  Patient verbalized and/or demonstrated understanding of education provided. []  Patient unable to verbalize and/or demonstrate understanding of education provided. Will continue education. [x]  Barriers to learning: none    PLAN:  Treatment Recommendations: Strengthening, Range of Motion, Home Exercise Program and fine motor coordination    []  Plan of care initiated. Plan to see patient 2 times per week for 8 weeks to address the treatment planned outlined above. [x]  Continue with current plan of care  []  Modify plan of care as follows:    []  Hold pending physician visit  []  Discharge    Time In 0758   Time Out 0839   Timed Code Minutes: 41 min   Total Treatment Time: 41 min       Electronically Signed by:  BENJAMÍN Butcher/HAYDE #[id number]

## 2021-11-16 ENCOUNTER — OFFICE VISIT (OUTPATIENT)
Dept: FAMILY MEDICINE CLINIC | Age: 66
End: 2021-11-16
Payer: MEDICARE

## 2021-11-16 VITALS
HEART RATE: 60 BPM | BODY MASS INDEX: 29.71 KG/M2 | SYSTOLIC BLOOD PRESSURE: 102 MMHG | RESPIRATION RATE: 32 BRPM | WEIGHT: 167.7 LBS | DIASTOLIC BLOOD PRESSURE: 58 MMHG

## 2021-11-16 DIAGNOSIS — R73.01 IFG (IMPAIRED FASTING GLUCOSE): ICD-10-CM

## 2021-11-16 DIAGNOSIS — I42.0 DILATED CARDIOMYOPATHY (HCC): ICD-10-CM

## 2021-11-16 DIAGNOSIS — Z98.61 S/P CORONARY ANGIOPLASTY: ICD-10-CM

## 2021-11-16 DIAGNOSIS — I48.0 PAROXYSMAL ATRIAL FIBRILLATION (HCC): Primary | ICD-10-CM

## 2021-11-16 DIAGNOSIS — I25.10 CORONARY ARTERY DISEASE INVOLVING NATIVE CORONARY ARTERY OF NATIVE HEART WITHOUT ANGINA PECTORIS: ICD-10-CM

## 2021-11-16 DIAGNOSIS — E78.00 PURE HYPERCHOLESTEROLEMIA: ICD-10-CM

## 2021-11-16 DIAGNOSIS — I69.30 HISTORY OF CEREBROVASCULAR ACCIDENT (CVA) WITH RESIDUAL DEFICIT: ICD-10-CM

## 2021-11-16 DIAGNOSIS — Z23 NEED FOR INFLUENZA VACCINATION: ICD-10-CM

## 2021-11-16 DIAGNOSIS — R53.1 WEAKNESS OF RIGHT SIDE OF BODY: ICD-10-CM

## 2021-11-16 DIAGNOSIS — I69.319 RESIDUAL COGNITIVE DEFICIT AS LATE EFFECT OF CEREBROVASCULAR ACCIDENT: ICD-10-CM

## 2021-11-16 PROCEDURE — 4040F PNEUMOC VAC/ADMIN/RCVD: CPT | Performed by: NURSE PRACTITIONER

## 2021-11-16 PROCEDURE — 1090F PRES/ABSN URINE INCON ASSESS: CPT | Performed by: NURSE PRACTITIONER

## 2021-11-16 PROCEDURE — G8417 CALC BMI ABV UP PARAM F/U: HCPCS | Performed by: NURSE PRACTITIONER

## 2021-11-16 PROCEDURE — 90694 VACC AIIV4 NO PRSRV 0.5ML IM: CPT | Performed by: NURSE PRACTITIONER

## 2021-11-16 PROCEDURE — G8400 PT W/DXA NO RESULTS DOC: HCPCS | Performed by: NURSE PRACTITIONER

## 2021-11-16 PROCEDURE — 3017F COLORECTAL CA SCREEN DOC REV: CPT | Performed by: NURSE PRACTITIONER

## 2021-11-16 PROCEDURE — 1123F ACP DISCUSS/DSCN MKR DOCD: CPT | Performed by: NURSE PRACTITIONER

## 2021-11-16 PROCEDURE — G8484 FLU IMMUNIZE NO ADMIN: HCPCS | Performed by: NURSE PRACTITIONER

## 2021-11-16 PROCEDURE — G8427 DOCREV CUR MEDS BY ELIG CLIN: HCPCS | Performed by: NURSE PRACTITIONER

## 2021-11-16 PROCEDURE — 4004F PT TOBACCO SCREEN RCVD TLK: CPT | Performed by: NURSE PRACTITIONER

## 2021-11-16 PROCEDURE — G0008 ADMIN INFLUENZA VIRUS VAC: HCPCS | Performed by: NURSE PRACTITIONER

## 2021-11-16 PROCEDURE — 99214 OFFICE O/P EST MOD 30 MIN: CPT | Performed by: NURSE PRACTITIONER

## 2021-11-16 SDOH — ECONOMIC STABILITY: FOOD INSECURITY: WITHIN THE PAST 12 MONTHS, YOU WORRIED THAT YOUR FOOD WOULD RUN OUT BEFORE YOU GOT MONEY TO BUY MORE.: NEVER TRUE

## 2021-11-16 SDOH — ECONOMIC STABILITY: FOOD INSECURITY: WITHIN THE PAST 12 MONTHS, THE FOOD YOU BOUGHT JUST DIDN'T LAST AND YOU DIDN'T HAVE MONEY TO GET MORE.: NEVER TRUE

## 2021-11-16 ASSESSMENT — ENCOUNTER SYMPTOMS
SHORTNESS OF BREATH: 0
COUGH: 0
ABDOMINAL PAIN: 0
NAUSEA: 0

## 2021-11-16 ASSESSMENT — SOCIAL DETERMINANTS OF HEALTH (SDOH): HOW HARD IS IT FOR YOU TO PAY FOR THE VERY BASICS LIKE FOOD, HOUSING, MEDICAL CARE, AND HEATING?: NOT HARD AT ALL

## 2021-11-16 NOTE — PROGRESS NOTES
Immunizations Administered     Name Date Dose Route    Influenza, Quadv, adjuvanted, 65 yrs +, IM, PF (Fluad) 11/16/2021 0.5 mL Intramuscular    Site: Deltoid- Right    Lot: [id number]    NDC: 95119-349-93          After obtaining consent, and per orders of Lincoln Altamirano CNP, injection of FLUAD 0.5ml given IM in Right deltoid by Wen Matta LPN. Patient instructed to report any adverse reaction to me immediately. Patient tolerated well and without incident. VIS given to the patient.

## 2021-11-16 NOTE — PROGRESS NOTES
Subjective:      Patient ID: Annalisa Hernandez [id number] is a 78 y.o. female here for evaluation. Chief Complaint   Patient presents with    3 Month Follow-Up     CVA    Health Maintenance     needs a colonoscopy and a mammogram       Going on road trip over the Winter with sister and will be gone until April 2021    Patient Active Problem List   Diagnosis    Coronary artery disease involving native coronary artery of native heart without angina pectoris    S/P angioplasty with stent-1995 and 1996- in [address] Pure hypercholesterolemia    Tobacco abuse    Cardiomyopathy ([address] EF 40%    [facility]cerebro[facility]) [facility] [facility]    Mild intermittent asthma without complication    Recurrent major depressive disorder, in full remission ([facility] [address]    Paroxysmal atrial fibrillation ([facility] [address]       COLONOSCOPY - denied  MAMMO - denied  DEXA - denied  CT LUNG - denied    CARDIO - Dr. Kenith Bradford Dr. Byron Lesch   ENT - Dr. Aleida Mishra    Denies CP, SOB or chest tightness. AFIB. On Eliquis and Amiodorone. On STATIN. Was admitted to [facility] on 8/10/21 for acute CVA. Slurred speech and right side weakness. Went to [facility] at [facility]. Discharged on 8/21/21. Improvement with right side weakness and speech. A&O x 3. Still have to take her time with comprehension. Improving. Continues with ST, PT and OT. She is driving - had drive evaluation with Therapy. Still smoking 1/2 ppd.   Denied quitting    BP Readings from Last 3 Encounters:   11/16/21 (!) 102/58   11/11/21 110/70   09/21/21 (!) 114/56       Labs reviewed    Lab Results   Component Value Date    LABA1C 5.7 07/08/2020    LABA1C 5.3 09/30/2016     Lab Results   Component Value Date     07/08/2020       No components found for: CHLPL  Lab Results   Component Value Date    TRIG 150 02/05/2021    TRIG 102 01/15/2020    TRIG 195 10/22/2018     Lab Results   Component Value Date    HDL 52 02/05/2021    HDL 45 01/15/2020    HDL 43 10/22/2018     Lab Results   Component Value Date    LDLCALC 77 01/15/2020    LDLCALC 83 10/22/2018    LDLCALC 102 09/30/2016     No results found for: LABVLDL      Chemistry        Component Value Date/Time     08/10/2021 1411    K 4.1 08/10/2021 1411     08/10/2021 1411    CO2 25 08/10/2021 1411    BUN 14 08/10/2021 1411    CREATININE 0.85 08/10/2021 1411        Component Value Date/Time    CALCIUM 8.60 (L) 08/10/2021 1411    ALKPHOS 108 08/10/2021 1411    AST 16 08/10/2021 1411    ALT 19 08/10/2021 1411    BILITOT 0.3 08/10/2021 1411            Lab Results   Component Value Date    TSH 2.880 09/30/2016       Lab Results   Component Value Date    WBC 8.8 08/10/2021    HGB 13.9 08/10/2021    HCT 42.7 08/10/2021    MCV 91.0 08/10/2021     08/10/2021         Health Maintenance   Topic Date Due    Hepatitis C screen  Never done    Colon cancer screen colonoscopy  Never done    Low dose CT lung screening  Never done    DEXA (modify frequency per FRAX score)  Never done   [employer] Visit (AWV)  Never done    Breast cancer screen  11/16/2020    A1C test (Diabetic or Prediabetic)  07/08/2021    COVID-19 Vaccine (3 - Booster for Pfizer series) 11/04/2021    Lipid screen  02/05/2022    TSH testing  02/05/2022    DTaP/Tdap/Td vaccine (3 - Td or Tdap) 11/11/2027    Flu vaccine  Completed    Shingles Vaccine  Completed    Pneumococcal 65+ years Vaccine  Completed    Hepatitis A vaccine  Aged Out    Hepatitis B vaccine  Aged Out    Hib vaccine  Aged Out    Meningococcal (ACWY) vaccine  Aged [employer] History   Administered Date(s) Administered    COVID-19, Pfizer, PF, 30mcg/0.3mL 04/14/2021, 05/04/2021    Influenza Virus Vaccine 02/18/2020    Influenza, Lilia Arena, IM, (6 mo and older Fluzone, Flulaval, Fluarix and 3 yrs and older Afluria) 10/05/2017, 10/08/2018    Influenza, Quadv, adjuvanted, 65 yrs +, IM, PF (Fluad) 10/14/2020, 11/16/2021    Pneumococcal Polysaccharide (Leahazzpx99) 10/05/2017, 02/18/2020    Td, (Adult) Not Adsorbed 11/11/2017    Tdap (Boostrix, Adacel) 10/05/2017    Zoster Live (Zostavax) 10/05/2017    Zoster Recombinant (Shingrix) 10/08/2018, 02/04/2019       Review of Systems   Constitutional: Negative for chills and fever. HENT: Negative. Respiratory: Negative for cough and shortness of breath. Cardiovascular: Negative for chest pain. Gastrointestinal: Negative for abdominal pain and nausea. Skin: Negative for rash. Neurological: Positive for speech difficulty and weakness. Negative for dizziness, light-headedness and headaches. Psychiatric/Behavioral: Positive for decreased concentration. Negative for confusion. Objective:   Physical Exam  Constitutional:       General: She is not in acute distress. Eyes:      Pupils: Pupils are equal, round, and reactive to light. Cardiovascular:      Rate and Rhythm: Normal rate and regular rhythm. Heart sounds: No murmur heard. Pulmonary:      Effort: Pulmonary effort is normal.      Breath sounds: Normal breath sounds. No wheezing. Abdominal:      General: Bowel sounds are normal. There is no distension. Palpations: Abdomen is soft. Tenderness: There is no abdominal tenderness. Musculoskeletal:         General: No tenderness. Normal range of motion. Cervical back: Normal range of motion and neck supple. Skin:     General: Skin is warm and dry. Findings: No rash. Psychiatric:         Attention and Perception: Attention normal.         Mood and Affect: Mood normal.         Speech: Speech is delayed. Behavior: Behavior normal. Behavior is cooperative. Thought Content: Thought content normal.         Cognition and Memory: Cognition normal.         Judgment: Judgment normal.          Assessment:       Diagnosis Orders   1. Paroxysmal atrial fibrillation (HCC)     2.  Coronary artery disease involving native coronary artery of native heart without angina pectoris     3. S/P coronary angioplasty     4. Dilated cardiomyopathy ([facility] [address]     5. History of cerebrovascular accident (CVA) with residual deficit     6. Weakness of right side of body     7. 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 Pure hypercholesterolemia     9. Need for influenza vaccination  INFLUENZA, QUADV, ADJUVANTED, 65 YRS =, IM, PF, PREFILL SYR, 0.5ML (FLUAD)   10.  IFG (impaired fasting glucose)  CBC    Comprehensive Metabolic Panel    Hemoglobin A1C           Plan:      Chronic conditions stable  Follow up with Specialists  Continue with Therapy  Smoking cessation - denied  CRS options discussed - denied  MAMMO, DEXA and CT LUNG Screen  Immunizations discussed - HD FLU in office  RTO in 6 months            Current Outpatient Medications   Medication Sig Dispense Refill    carbamide peroxide (DEBROX) 6.5 % otic solution Place 5 drops into the right ear daily for 12 days 15 mL 0    fluticasone (FLONASE) 50 MCG/ACT nasal spray 2 sprays by Nasal route 2 times daily 3 each 3    sertraline (ZOLOFT) 50 MG tablet TAKE ONE TABLET BY MOUTH EVERY DAY 90 tablet 3    pramipexole (MIRAPEX) 0.5 MG tablet TAKE ONE TABLET BY MOUTH TWICE A  tablet 3    clopidogrel (PLAVIX) 75 MG tablet TAKE 1 TABLET BY MOUTH EVERY DAY 90 tablet 3    apixaban (ELIQUIS) 5 MG TABS tablet TAKE 1 TABLET BY MOUTH TWICE A  tablet 3    amiodarone (CORDARONE) 200 MG tablet Take 1 tablet by mouth daily      isosorbide mononitrate (IMDUR) 30 MG extended release tablet Take 1 tablet by mouth daily      melatonin 3 MG TABS tablet Take 1 tablet by mouth nightly as needed      nitroGLYCERIN (NITROSTAT) 0.4 MG SL tablet Place 1 tablet under the tongue as needed      ranolazine (RANEXA) 500 MG extended release tablet Take 1 tablet by mouth 2 times daily      ticagrelor (BRILINTA) 90 MG TABS tablet Take 1 tablet by mouth 2 times daily      atorvastatin (LIPITOR) 80 MG tablet Take 1 tablet by mouth daily      metoprolol tartrate (LOPRESSOR) 25 MG tablet TAKE 1 TABLET BY MOUTH TWICE A  tablet 3    rOPINIRole (REQUIP) 3 MG tablet TAKE ONE TABLET BY MOUTH EVERY DAY AT NIGHT 90 tablet 3    Multiple Vitamins-Minerals (MULTI-VITAMIN GUMMIES PO) Take by mouth      aspirin 81 MG tablet Take 1 tablet by mouth daily 90 tablet 3     No current facility-administered medications for this visit.

## 2021-11-17 ENCOUNTER — HOSPITAL ENCOUNTER (OUTPATIENT)
Dept: PHYSICAL THERAPY | Age: 66
Setting detail: THERAPIES SERIES
Discharge: HOME | End: 2021-11-17
Payer: MEDICARE

## 2021-11-17 PROCEDURE — 97162 PT EVAL MOD COMPLEX 30 MIN: CPT

## 2021-11-17 NOTE — PROGRESS NOTES
** PLEASE SIGN, DATE AND TIME CERTIFICATION BELOW AND RETURN TO [facility] OUTPATIENT REHABILITATION (FAX #: [phone]). ATTEST/CO-SIGN IF ACCESSING VIA [employer]. THANK YOU.**    I certify that I have examined the patient below and determined that Physical Medicine and Rehabilitation service is necessary and that I approve the established plan of care for up to 90 days or as specifically noted. Attestation, signature or co-signature of physician indicates approval of certification requirements.    ________________________ ____________ __________  Physician Signature   Date   Time  [address] [facility]  PHYSICAL THERAPY  [x] EVALUATION  [] DAILY NOTE (LAND) [] DAILY NOTE (AQUATIC ) [] PROGRESS NOTE [] DISCHARGE NOTE    [] [facility]   [] [place] [address]    [] [facility]   [x] Dipak Samuel    Date: 2021  Patient Name:  Monique Chávez  :   MRN: [id number]  CSN: [id number]    Referring Practitioner JANIA Dunham -*   Diagnosis Other symptoms and signs involving the musculoskeletal system [R29.898]    Treatment Diagnosis Unsteadiness, gait dysfunction    Date of Evaluation 21    Additional Pertinent History    past medical history  heart attack, PACEMAKER, high blood pressure and stroke. Functional Outcome Measure Used tinetti   Functional Outcome Score 28 (21)       Insurance: Primary: Payor: Mariano Jacob[employer] /  /  / ,   Secondary: [facility]   Authorization Information: None needed   Visit # 1, 1/10 for progress note   Visits Allowed:     Recertification Date:    Physician Follow-Up: Unknown    Physician Orders:    History of Present Illness: Patient reports she had a CVA around 2021 in which her speech was slurred. Was admitted to hospital  for 11 days. She also had Home health.         SUBJECTIVE:  Patient initially missed her PT consult   Was rescheduled due to complaint of mild balance problems. She has an infection in her right ear-- has an extra piece of skin. She has procedure scheduled for Tues to remove it-- Dr Maxwell Dubois. She is feeling off balance once a day-  Usually with standing to put on pants and occasionally when she is walking down the hallway in her camper. denies problem with rolling over in bed. On  Nov 29th she will be leaving for 4 months travels with her sister. She will be unavailable to cont with PT     Social/Functional History and Current Status:  Medications and Allergies have been reviewed and are listed on Medical History Questionnaire. Hollie Oro lives alone in a single story home with stairs and a handrail to enter. 3 steps       Task Previous Current   ADLs  Independent Independent   IADL's Independent Independent   Ambulation Independent Independent   Transfers Independent Independent   Recreation Not Applicable Not Applicable   Community Integration Not Applicable Not Applicable   Driving Active  Active    Work Retired  Retired     OBJECTIVE:    Pain: 0/10    Palpation    Observation Sit to stand without use of arms,  No stagger durign eval process    Posture Mild forward head and shoulders. No lean        Range of Motion    Strength B hips and knee 5/5 -- jacob to walk on toes. Coordination    Sensation Denies numbness and tingling   Bed Mobility    Transfers    Ambulation No gait deviations or path deviations, walking up and down stairs with alternating feet,     Stairs Reports jacob with alternating steps. Balance SLS  L 12, 8 , 4 sec   R 20 sec-- normal for age is 22 sec  Slight decrease compared to average. Special Tests VOR slow  Neg, spontaneous nystagmus keg, gaze hold nystagmus neg,   Smooth pursuit neg  Saccades neg         TINETTI BALANCE TEST    BALANCE Patient is seated in a hard, armless chair   1 SITTING BALANCE 1 - Steady, safe   2. RISES FROM CHAIR 2 - Able without use of arms   3.   ATTEMPTS TO RISE 2 - Able to rise, 1 attempt   4. IMMEDIATE STANDING BALANCE (FIRST 5 SECONDS) 2 - Steady without walker or other support   5. STANDING BALANCE 2 - Narrow stance without support   6. NUDGED 2 - Steady   7. EYES CLOSED 1 - Steady   8. TURNING 360 DEGREES 1 - Continuous and 1 - Steady   9. SITTING DOWN 2 - Safe,smooth motion   BALANCE SCORE 16/16       GAIT SECTION Patient stands with therapist, walks across room (+/- aids), fist at usual pace, then at rapid pace. 1.  INDICATION OF GAIT (Immediately after told to \"go\" 1 - No hesitancy   2. STEP LENGTH AND HEIGHT 1 - Step through Right and 1 - Step through Left   3. FOOT CLEARANCE 1 - Left foot clears floor and 1 - Right foot clears floor   4. STEP SYMMETRY 1 - Right and left step length appear equal   5. STEP CONTINUITY 1 - Steps appear continuous   6. PATH 2 - Straight without walking aid   7. TRUNK 2 - No sway, flexion, use of arms or walking aid   8. WALKING TIME 1 - Heels almost touching while walking   GAIT SCORE 12/12   TOTAL SCORE 28/28   Risk Indicators:  Less than/equal to 18 = high risk  19-23 Moderate risk  Greater than/equal to 24 = low risk          TREATMENT   Precautions:    Pain:     X in shaded column indicates activity completed today   Modalities Parameters/  Location  Notes                     Manual Therapy Time/Technique  Notes                     Exercise/Intervention   Notes                                                                                  Specific Interventions Next Treatment: none indiciated unless further problems arise after inner ear surgery with Dr Meghana Wise on Nov 23.     Activity/Treatment Tolerance:  []  Patient tolerated treatment well  []  Patient limited by fatigue  []  Patient limited by pain   []  Patient limited by medical complications  []  Other:     Assessment: very mild balance deficit with SLS stance only   Good LE strength and gait   Further PT not indicated at this time unless her inner ear issues do not resolve after surgery on Nov 23. Body Structures/Functions/Activity Limitations: impaired balance  Prognosis: good    GOALS:  No goal set due to no further PT needed unless other problems arrive following inner ear surgery. Patient Goal: no loss in balance, travel     Short Term Goals:  Time Frame:        Long Term Goals:  Time Frame:        Patient Education:   [x]  HEP/Education Completed: Plan of Care, Goals, practice SLS , call if she has problem after surgery    [facility] Access Code:  []  No new Education completed  []  Reviewed Prior HEP      []  Patient verbalized and/or demonstrated understanding of education provided. []  Patient unable to verbalize and/or demonstrate understanding of education provided. Will continue education. []  Barriers to learning: none     PLAN:  Treatment Recommendations: none, unless she has further balance problems after she ear surgery. []  Plan of care initiated. Plan to see patient   times per week for   weeks to address the treatment planned outlined above.   []  Continue with current plan of care  []  Modify plan of care as follows:    [x]  Hold pending physician visit with ENT   If doing well , will not see for formal PT   []  Discharge    Time In 1050   Time Out 1130   Timed Code Minutes: 0 min   Total Treatment Time: 40 min       Electronically Signed by: Lory Mirza, PT

## 2021-11-18 ENCOUNTER — HOSPITAL ENCOUNTER (OUTPATIENT)
Dept: SPEECH THERAPY | Age: 66
Setting detail: THERAPIES SERIES
Discharge: HOME | End: 2021-11-18
Payer: MEDICARE

## 2021-11-18 ENCOUNTER — HOSPITAL ENCOUNTER (OUTPATIENT)
Dept: OCCUPATIONAL THERAPY | Age: 66
Setting detail: THERAPIES SERIES
Discharge: HOME | End: 2021-11-18
Payer: MEDICARE

## 2021-11-18 PROCEDURE — 97130 THER IVNTJ EA ADDL 15 MIN: CPT

## 2021-11-18 PROCEDURE — 97110 THERAPEUTIC EXERCISES: CPT

## 2021-11-18 PROCEDURE — 97129 THER IVNTJ 1ST 15 MIN: CPT

## 2021-11-18 NOTE — PROGRESS NOTES
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 Visit # 5, 5/10 for progress note   Visits Allowed: Unlimited visits based on medical necessity   Recertification Date:    Physician Follow-Up: 21 f/u with JANIA Davis-MIGDALIA   Physician Orders: Evaluate and treat   Pertinent History: Patient reports she had a stroke last month in which her speech was slurred. Patient reports she was hospitalized to [facility] for about 4 days. She states she did receive speech therapy services during hospitalization. Patient reports she had 3 weeks of home health therapy, PT, OT, ST. Currently, paitent states she has noticed difficulty in her speech and ability to remember and form words. SUBJECTIVE: Patient pleasant and cooperative. Arrived alone for session. Patient leaving for months of travel on , therefore planning for upcoming session to be discharge. SHORT TERM GOAL #1:  Goal 1: Patient will complete immediate recall tasks with 5-6 units while implementing memory strategies with 80% accuracy with min cues to promote retention of novel information.    INTERVENTIONS: Immediate recall task to verbally recall 5 unit word list verbalized by SLP is independent, 1/4 with mod semantic cues  Jobs done with a brush: 3/4 independent, 1/4 min visual cues    Household tasks that have to be done while standin/4 independent      SHORT TERM GOAL #4:  Goal 4: Patient will complete divided and alteranting attention tasks with no more than 2 errors in a 5 minute time span to promote safe participation in complex cognitive activities (ex: driving). INTERVENTIONS: Divided attention exercise completed for 1 minute 30 seconds where patient simultaneously completing visual scanning task and auditory ID task. Patient progressing through task independently with overall good attention to both components. Visual scanning for target \"o\" in letter page:  independent   Auditory ID of target \"7\" in verbalized random digit span: 14/15 independently  *Patient rated task difficulty at 8/10 with 10 referring to most difficult. SLP provided verbal feedback on results to promote learning and further skill development. SHORT TERM GOAL #5:  Goal 5: Patient will complete higher level reasoning and executive function tasks (medications, finances, scheduling) with 80% accuracy with min cues to promote safe participation in these activities. INTERVENTIONS: Did not directly address this session. Long-term Goals  Timeframe for Long-term Goals: 4 weeks  Goal 1: Patient will improve FOM, NICOLE NOMs Memory score from a level 4 to a level 6 to independently use external memory strategies for completion of complex cognitive tasks. Assessment: Progressing toward goals. Specific Interventions Next Treatment: Complete discharge. Exercises addressing immediate recall, working memory, higher level naming, sentence repetition, divided and alternating attention, higher level reasoning and executive function tasks.      Activity/Treatment Tolerance:  [x]  Patient tolerated treatment well  []  Patient limited by fatigue  []  Patient limited by pain   []  Patient limited by other medical complications  []  Other:     Patient Education:   []  HEP/Education Completed: Plan of Care, Goals, internal and external memory strategies  []  No new Education completed  [x]  Reviewed Prior HEP      [x]  Patient verbalized and/or demonstrated understanding of education provided. []  Patient unable to verbalize and/or demonstrate understanding of education provided. Will continue education. []  Barriers to learning:     PLAN:  []  Plan of care initiated. Plan to see patient 1 time per week for 4 weeks to address the treatment planned outlined above.   [x]  Continue with current plan of care  []  Modify plan of care as follows:    []  Hold pending physician visit  []  Discharge    Time In 0830   Time Out 0914   Timed Code Minutes: 44 min   Total Treatment Time: 40 min       Osman Barajas M.S., [address] [place] [address]

## 2021-11-18 NOTE — PROGRESS NOTES
[address] THERAPY  [] EVALUATION  [x] DAILY NOTE (LAND) [] DAILY NOTE (AQUATIC ) [] PROGRESS NOTE [] DISCHARGE NOTE    [] [address] - [place]   [] [address]    [] [address] [facility]   [x] Eve Baires    Date: 2021  Patient Name:  Veronica Goddard  :   MRN: [id number]  CSN: [id number]    Referring Practitioner  JANIA Murillo    Diagnosis Other symptoms and signs involving the musculoskeletal system [R29.898]  Unspecified sequelae of cerebral infarction [I69.30]    Treatment Diagnosis Late effects of CVA   Date of Evaluation 21      Functional Outcome Measure Used Upper Extremity Functional Scale   Functional Outcome Score 42/80 (21)       Insurance: Primary: Payor: MEDICARE /  /  / ,   Secondary: [facility]   Authorization Information: Unlimited visits based on medical necessity, iontophoresis and hot/cold packs are not covered   Visit # 8, 8/10 for progress note   Visits Allowed: Unlimited visits based on medical necessity   Recertification Date:    Physician Follow-Up: Not stated   Physician Orders: Script dated 21 for eval and treat   Pertinent History: Patient reports she had a CVA around 2021 in which her speech was slurred. She states her right side strength and ROM was affected. Patient reports she was admitted to [facility] for about 4 days. Patient reports she had 3 weeks of home health therapy, PT, OT, ST.  Patient lists past medical history as heart attack, PACEMAKER, high blood pressure and stroke. SUBJECTIVE: Patient reports good tolerance from therapy session, with no increased in pain or discomfort noted. Patient reports \"I think I am doing pretty well\" when asked about typical household tasks.      OBJECTIVE:    TREATMENT   Precautions: None per patient    Pain: Denies     X in shaded column indicates Activity Completed Today   Modalities Parameters/  Location  Notes/Comments                     Manual Therapy Time/  Technique  Notes/Comments                     Exercises   Sets/  Sec Reps  Notes/Comments   R Submaximal shoulder isometrics for flexion, extension, IR, ER and abduction 5 sec hold 10 X    Orange putty for right , pinch and taffy pulls  3 min X    AROM B shoulder flexion, abduction, scaption  1 10 ea X With mirror for visual feedback. 1# weight  Added scaption this date, min fatigue noted    Biodex UBE at 75 RPM 3 minutes backward and 2 minutes forward  5 min X Warm up this date    Pulleys for shoulder flexion 1 15 X Cool down this date   Red flexbar for bends in supination/pronation and twists. Turning to simulate opening a jar 1 15 ea X Difficulty with supination bends    Green digit flex for individual finger flexion and combined gripping 1 10 X    R Wall flexion and scaption slides 5 sec hold 10 ea X    Wall walks with swiss ball with alternating extension at end range  1 10  X Initiated this date. Good stretch at end range extension and good challenge for alternating sides    B Ball on the wall- circles CW/CCW shoulder flexion at 90 degrees for shoulder stability (small red ball) 1 10 X Initiated this date. Just right challenge with difficulty presented with coordination    R Orange theraband for shoulder flexion, extension, IR, ER and rows 2  10 X    Activities Time    Notes/Comments   Fine motor coordination task:  Patient picked up handful of pennies and fed them one at a time into slotted canister for in hand manipulation 5 minutes  Several drops   Fine motor coordination task of finding and pulling out 12 pennies from orange putty. OUYAe Pegboard for fine motor coordination task with right hand, placing pegs, washers and sleeves 6 minutes  In hand manipulation to place pegs in board. Several drops with the washers. Patient practiced writing her signature x 10 and months of the year 4 minutes  Handwriting task. Signature started to move up at end after repeated writing. Pinch strength:  Patient pinched yellow, red, green, blue and black clothespins on vertical pole with right hand for pinch  strengthening. Used 3 point pinch and lateral pinch. Nice shoulder reaching noted. Specific Interventions Next Treatment: UE ROM, Strength,  and pinch strength, fine motor coordination    Activity/Treatment Tolerance:  [x]  Patient tolerated treatment well  []  Patient limited by fatigue  []  Patient limited by pain   []  Patient limited by other medical complications  []  Other:     Assessment: Patient is progressing towards her goals. Continued to challenge UE strength and coordination this date with newly added exercises noted above, fair tolerance with difficulty demonstrated. Fatigue demonstrated throughout session. Areas for Improvement: impaired coordination, impaired ROM and impaired strength  Prognosis: good    GOALS:  Patient Goal: improve writing, speech and strength    Short Term Goals:  Time Frame: 5 weeks  1. Patient will improve right fine motor coordination as demonstrated by completing 9 hole peg in 27 seconds for ease with writing. 2.  Patient will increase AROM right shoulder flexion to 155, extension to 50, abduction to 122, IR to 45 degrees for increased ease with doing her hair and dressing ease. 3.  Patient will increase right  strength to 27#, 3 point pinch to 9# and lateral pinch to 11# for increased ease with opening packages and jars. Time Frame: 8 weeks  1. Patient will improve UEFS to at least 52.  2.  Patient will report handwriting signature at least 80% of prior to CVA. 3.  Patient will be independent with UE finalized HEP for increased ease with homemaking and carrying groceries.      Patient Education:   [x]  HEP/Education Completed: Plan of Care, Goals, fine motor coordination activities, AROM shoulder flexion, extension, IR, ER, abduction, submaximal shoulder isometrics with handouts given   [employer] Access Code for HEP: Access Code: 11/11/21: [email or web address]   URL: [email or web address]   Date: 11/11/2021   Prepared by: Jigar Smith      Exercises   Standing Shoulder Row with Anchored Resistance - 1-2 x daily - 7 x weekly - 1 sets - 10 reps   Shoulder Extension with Resistance - 1-2 x daily - 7 x weekly - 1 sets - 10 reps   Shoulder External Rotation with Anchored Resistance - 1-2 x daily - 7 x weekly - 1 sets - 10 reps   Shoulder Internal Rotation with Resistance - 1-2 x daily - 7 x weekly - 1 sets - 10 reps   Standing Shoulder Flexion with Posterior Anchored Resistance - 1-2 x daily - 7 x weekly - 1 sets - 10 reps     []  No new Education completed  []  Reviewed Prior HEP      [x]  Patient verbalized and/or demonstrated understanding of education provided. []  Patient unable to verbalize and/or demonstrate understanding of education provided. Will continue education. [x]  Barriers to learning: none    PLAN:  Treatment Recommendations: Strengthening, Range of Motion, Home Exercise Program and fine motor coordination    []  Plan of care initiated. Plan to see patient 2 times per week for 8 weeks to address the treatment planned outlined above. [x]  Continue with current plan of care  []  Modify plan of care as follows:    []  Hold pending physician visit  []  Discharge    Time In 0915   Time Out 0955   Timed Code Minutes: 40 min   Total Treatment Time: 40 min       Electronically Signed by:  Sandra CARDENAS #[id number]

## 2021-11-22 ENCOUNTER — TELEPHONE (OUTPATIENT)
Dept: FAMILY MEDICINE CLINIC | Age: 66
End: 2021-11-22

## 2021-11-22 NOTE — TELEPHONE ENCOUNTER
Brother Amos Stevens +COVID today and is currently admitted to [facility] ICU. Patient was exposed on Friday 11/19/21. Patient is asymptomatic at this time. Does she need to be tested. Patient would like a call back.

## 2021-11-23 ENCOUNTER — APPOINTMENT (OUTPATIENT)
Dept: SPEECH THERAPY | Age: 66
End: 2021-11-23
Payer: MEDICARE

## 2021-11-23 ENCOUNTER — OFFICE VISIT (OUTPATIENT)
Dept: ENT CLINIC | Age: 66
End: 2021-11-23
Payer: MEDICARE

## 2021-11-23 VITALS
TEMPERATURE: 98.6 F | SYSTOLIC BLOOD PRESSURE: 114 MMHG | HEART RATE: 72 BPM | BODY MASS INDEX: 29.58 KG/M2 | DIASTOLIC BLOOD PRESSURE: 70 MMHG | RESPIRATION RATE: 14 BRPM | WEIGHT: 167 LBS

## 2021-11-23 DIAGNOSIS — H90.A11 CONDUCTIVE HEARING LOSS OF RIGHT EAR WITH RESTRICTED HEARING OF LEFT EAR: Primary | ICD-10-CM

## 2021-11-23 DIAGNOSIS — H61.21 IMPACTED CERUMEN OF RIGHT EAR: ICD-10-CM

## 2021-11-23 DIAGNOSIS — H90.3 SENSORINEURAL HEARING LOSS (SNHL) OF BOTH EARS: ICD-10-CM

## 2021-11-23 PROCEDURE — 1123F ACP DISCUSS/DSCN MKR DOCD: CPT | Performed by: OTOLARYNGOLOGY

## 2021-11-23 PROCEDURE — G8484 FLU IMMUNIZE NO ADMIN: HCPCS | Performed by: OTOLARYNGOLOGY

## 2021-11-23 PROCEDURE — G8400 PT W/DXA NO RESULTS DOC: HCPCS | Performed by: OTOLARYNGOLOGY

## 2021-11-23 PROCEDURE — G8427 DOCREV CUR MEDS BY ELIG CLIN: HCPCS | Performed by: OTOLARYNGOLOGY

## 2021-11-23 PROCEDURE — G8417 CALC BMI ABV UP PARAM F/U: HCPCS | Performed by: OTOLARYNGOLOGY

## 2021-11-23 PROCEDURE — 3017F COLORECTAL CA SCREEN DOC REV: CPT | Performed by: OTOLARYNGOLOGY

## 2021-11-23 PROCEDURE — 1090F PRES/ABSN URINE INCON ASSESS: CPT | Performed by: OTOLARYNGOLOGY

## 2021-11-23 PROCEDURE — 4040F PNEUMOC VAC/ADMIN/RCVD: CPT | Performed by: OTOLARYNGOLOGY

## 2021-11-23 PROCEDURE — 99213 OFFICE O/P EST LOW 20 MIN: CPT | Performed by: OTOLARYNGOLOGY

## 2021-11-23 PROCEDURE — 69210 REMOVE IMPACTED EAR WAX UNI: CPT | Performed by: OTOLARYNGOLOGY

## 2021-11-23 PROCEDURE — 4004F PT TOBACCO SCREEN RCVD TLK: CPT | Performed by: OTOLARYNGOLOGY

## 2021-11-23 ASSESSMENT — ENCOUNTER SYMPTOMS
CHOKING: 0
WHEEZING: 0
SHORTNESS OF BREATH: 0
SINUS PRESSURE: 0
STRIDOR: 0
ABDOMINAL PAIN: 0
NAUSEA: 0
CHEST TIGHTNESS: 0
COLOR CHANGE: 0
APNEA: 0
COUGH: 0
VOICE CHANGE: 0
RHINORRHEA: 0
SORE THROAT: 0
DIARRHEA: 0
FACIAL SWELLING: 0
TROUBLE SWALLOWING: 0
VOMITING: 0

## 2021-11-23 NOTE — DISCHARGE SUMMARY
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 See last note for details related to results of therapy and goal achievement. Reason for discharge: Patient canceled last scheduled appointment. In previous session, discussion was held about this being final appointment where discharge would be completed. Patient is leaving for an extended trip out and would not be able to re-schedule this appointment.       Osman Barajas M.S., [address] [place] [address]

## 2021-11-23 NOTE — PROGRESS NOTES
[facility] PHYSICIANS [place] SPECIALTY  [facility] EAR, NOSE AND THROAT  [facility]  Dept: [phone]  Dept Fax: [phone]  Loc: [phone]    Ronald Bah is a 78 y.o. female who was referred byNo ref. provider found for:  Chief Complaint   Patient presents with    Follow-up     Patient is here for office procdure, right ear   . HPI:     Ronald Bah is a 78 y.o. female who presents today for follow-up on her cerumen impaction right ear that is suspected of being the cause of her conductive component of her hearing loss. She did use the Debrox.     History:     No Known Allergies  Current Outpatient Medications   Medication Sig Dispense Refill    carbamide peroxide (DEBROX) 6.5 % otic solution Place 5 drops into the right ear daily for 12 days 15 mL 0    fluticasone (FLONASE) 50 MCG/ACT nasal spray 2 sprays by Nasal route 2 times daily 3 each 3    sertraline (ZOLOFT) 50 MG tablet TAKE ONE TABLET BY MOUTH EVERY DAY 90 tablet 3    pramipexole (MIRAPEX) 0.5 MG tablet TAKE ONE TABLET BY MOUTH TWICE A  tablet 3    clopidogrel (PLAVIX) 75 MG tablet TAKE 1 TABLET BY MOUTH EVERY DAY 90 tablet 3    apixaban (ELIQUIS) 5 MG TABS tablet TAKE 1 TABLET BY MOUTH TWICE A  tablet 3    amiodarone (CORDARONE) 200 MG tablet Take 1 tablet by mouth daily      isosorbide mononitrate (IMDUR) 30 MG extended release tablet Take 1 tablet by mouth daily      melatonin 3 MG TABS tablet Take 1 tablet by mouth nightly as needed      nitroGLYCERIN (NITROSTAT) 0.4 MG SL tablet Place 1 tablet under the tongue as needed      ranolazine (RANEXA) 500 MG extended release tablet Take 1 tablet by mouth 2 times daily      ticagrelor (BRILINTA) 90 MG TABS tablet Take 1 tablet by mouth 2 times daily      atorvastatin (LIPITOR) 80 MG tablet Take 1 tablet by mouth daily      metoprolol tartrate (LOPRESSOR) 25 MG tablet TAKE 1 TABLET BY MOUTH TWICE A  tablet 3    rOPINIRole (REQUIP) 3 MG tablet TAKE ONE TABLET BY MOUTH EVERY DAY AT NIGHT 90 tablet 3    Multiple Vitamins-Minerals (MULTI-VITAMIN GUMMIES PO) Take by mouth      aspirin 81 MG tablet Take 1 tablet by mouth daily 90 tablet 3     No current facility-administered medications for this visit. Past Medical History:   Diagnosis Date    Coronary artery disease involving native coronary artery of native heart without angina pectoris 9/30/2016    Pure hypercholesterolemia 9/30/2016      Past Surgical History:   Procedure Laterality Date    CORONARY ANGIOPLASTY WITH STENT PLACEMENT      right main done in [address]      partial    LUNG BIOPSY      TRANSTHORACIC ECHOCARDIOGRAM  10/31/2016     Family History   Problem Relation Age of Onset    Cancer Mother         lung cancer    Other Father         pneumonia    Other Sister         alcohol poisoning   [facility] Cancer Sister         female organs     Social History     Tobacco Use    Smoking status: Current Every Day Smoker     Packs/day: 0.50     Years: 52.00     Pack years: 26.00     Types: Cigarettes     Start date: 12/6/1966    Smokeless tobacco: Never Used   Substance Use Topics    Alcohol use: Yes     Alcohol/week: 5.0 - 6.0 standard drinks     Types: 5 - 6 Cans of beer per week       Subjective:      Review of Systems   Constitutional: Negative for activity change, appetite change, chills, diaphoresis, fatigue, fever and unexpected weight change. HENT: Positive for ear pain and hearing loss. Negative for congestion, dental problem, ear discharge, facial swelling, mouth sores, nosebleeds, postnasal drip, rhinorrhea, sinus pressure, sneezing, sore throat, tinnitus, trouble swallowing and voice change. Eyes: Negative for visual disturbance. Respiratory: Negative for apnea, cough, choking, chest tightness, shortness of breath, wheezing and stridor. Cardiovascular: Positive for chest pain.  Negative for palpitations and leg swelling. Gastrointestinal: Negative for abdominal pain, diarrhea, nausea and vomiting. Endocrine: Negative for cold intolerance, heat intolerance, polydipsia and polyuria. Genitourinary: Negative for dysuria, enuresis and hematuria. Musculoskeletal: Negative for arthralgias, gait problem, neck pain and neck stiffness. Skin: Negative for color change and rash. Allergic/Immunologic: Negative for environmental allergies, food allergies and immunocompromised state. Neurological: Negative for dizziness, syncope, facial asymmetry, speech difficulty, light-headedness and headaches. Hematological: Negative for adenopathy. Does not bruise/bleed easily. Psychiatric/Behavioral: Negative for confusion and sleep disturbance. The patient is not nervous/anxious. Objective:   /70 (Site: Right Upper Arm, Position: Sitting)   Pulse 72   Temp 98.6 °F (37 °C)   Resp 14   Wt 167 lb (75.8 kg)   BMI 29.58 kg/m²     Physical Exam   Ears: Left ear is clear right ear has cerumen is dry and hard medially extending up onto the tympanic membrane especially in the anterior sulcus    Cerumen removal using operating microscope,   Under the operating microscope, the right ear was cleaned with wire loop, forceps and suction as needed. Patient tolerated it well. Findings: hard dry cerumen softened with hydrogen peroxide and removed with improvement in her hearing. Watts became midline at 256 Hz. She still felt that the hearing on the right was mildly worse than the left, but that improved dramatically with the removal of the wax. Data:  All of the past medical history, past surgical history, family history,social history, allergies and current medications were reviewed with the patient. Assessment & Plan   Diagnoses and all orders for this visit:     Diagnosis Orders   1. Sensorineural hearing loss (SNHL) of both ears  Audiometry with tympanometry   2.  Conductive hearing loss of right ear with restricted hearing of left ear  Audiometry with tympanometry   3. Impacted cerumen of right ear  HI REMOVAL IMPACTED CERUMEN INSTRUMENTATION UNILAT       The findings were explained and her questions were answered. In a few days she'll be leaving for approximately 5 months. That will give time for the rest of any wax impaction to loosen from the anterior sulcus and lateralize. Audiogram is ordered for her return. Return in about 7 months (around 6/9/2022) for Audiol review. Gisel Wasserman. Ben De La Cruz MD    **This report has been created using voice recognition software. It may contain minor errors which are inherent in voicerecognition technology. **

## 2022-01-21 ENCOUNTER — TELEPHONE (OUTPATIENT)
Dept: ENT CLINIC | Age: 67
End: 2022-01-21

## 2022-01-21 ENCOUNTER — TELEPHONE (OUTPATIENT)
Dept: FAMILY MEDICINE CLINIC | Age: 67
End: 2022-01-21

## 2022-01-21 NOTE — TELEPHONE ENCOUNTER
She probably should have her ear evaluated. I would recommend establishing with an ENT locally in [place]. We can follow up with her when she returns if needed.

## 2022-01-21 NOTE — TELEPHONE ENCOUNTER
Patient called and left a message she stated that she is having right ear fullness and says it's been bothering her for months. She is getting vertigo now from it. She is current out of state in [place]. She wanted to know if there something she could do or should she see an ENT in [place] as she wont be back in town until May.

## 2022-01-21 NOTE — TELEPHONE ENCOUNTER
Pt called office stating she is in [address] [facility] right now until May 2022. She requests an ENT referral to one in [address] [place] [address]. Pt c/o right ear fullness and says it's been bothering her for months. She is getting vertigo now from it. Per Epic pt is established with an ENT here in [facility] and was seen by them for the same issue. Pt has NOT reached out to her current ENT to see what recommendations they have while out of town. Pt will call ENT DR. Johnson's office to see what they recommend. If they recommend that she get established with an ENT in [address] [place] [address], pt will call insurance for coverage in the [address] [place] [address] area and give us a call back to have the referral sent.

## 2022-05-03 ENCOUNTER — OFFICE VISIT (OUTPATIENT)
Dept: FAMILY MEDICINE CLINIC | Age: 67
End: 2022-05-03
Payer: MEDICARE

## 2022-05-03 VITALS
HEART RATE: 61 BPM | SYSTOLIC BLOOD PRESSURE: 118 MMHG | BODY MASS INDEX: 28.01 KG/M2 | HEIGHT: 63 IN | DIASTOLIC BLOOD PRESSURE: 78 MMHG | RESPIRATION RATE: 16 BRPM | WEIGHT: 158.1 LBS | OXYGEN SATURATION: 97 %

## 2022-05-03 DIAGNOSIS — Z72.0 TOBACCO ABUSE: ICD-10-CM

## 2022-05-03 DIAGNOSIS — I25.10 CORONARY ARTERY DISEASE INVOLVING NATIVE CORONARY ARTERY OF NATIVE HEART WITHOUT ANGINA PECTORIS: ICD-10-CM

## 2022-05-03 DIAGNOSIS — I48.0 PAROXYSMAL ATRIAL FIBRILLATION (HCC): ICD-10-CM

## 2022-05-03 DIAGNOSIS — G25.81 RLS (RESTLESS LEGS SYNDROME): ICD-10-CM

## 2022-05-03 DIAGNOSIS — H90.5 SENSORINEURAL HEARING LOSS (SNHL) OF RIGHT EAR, UNSPECIFIED HEARING STATUS ON CONTRALATERAL SIDE: ICD-10-CM

## 2022-05-03 DIAGNOSIS — I69.319 RESIDUAL COGNITIVE DEFICIT AS LATE EFFECT OF CEREBROVASCULAR ACCIDENT: ICD-10-CM

## 2022-05-03 DIAGNOSIS — E78.00 PURE HYPERCHOLESTEROLEMIA: ICD-10-CM

## 2022-05-03 DIAGNOSIS — F33.42 RECURRENT MAJOR DEPRESSIVE DISORDER, IN FULL REMISSION (HCC): ICD-10-CM

## 2022-05-03 DIAGNOSIS — I69.30 HISTORY OF CEREBROVASCULAR ACCIDENT (CVA) WITH RESIDUAL DEFICIT: ICD-10-CM

## 2022-05-03 DIAGNOSIS — Z78.0 POST-MENOPAUSAL: ICD-10-CM

## 2022-05-03 DIAGNOSIS — Z98.61 S/P CORONARY ANGIOPLASTY: ICD-10-CM

## 2022-05-03 DIAGNOSIS — Z12.31 BREAST CANCER SCREENING BY MAMMOGRAM: ICD-10-CM

## 2022-05-03 DIAGNOSIS — R73.01 IFG (IMPAIRED FASTING GLUCOSE): ICD-10-CM

## 2022-05-03 DIAGNOSIS — Z00.00 INITIAL MEDICARE ANNUAL WELLNESS VISIT: Primary | ICD-10-CM

## 2022-05-03 DIAGNOSIS — I42.0 DILATED CARDIOMYOPATHY (HCC): ICD-10-CM

## 2022-05-03 PROCEDURE — 4040F PNEUMOC VAC/ADMIN/RCVD: CPT | Performed by: NURSE PRACTITIONER

## 2022-05-03 PROCEDURE — 1123F ACP DISCUSS/DSCN MKR DOCD: CPT | Performed by: NURSE PRACTITIONER

## 2022-05-03 PROCEDURE — G8400 PT W/DXA NO RESULTS DOC: HCPCS | Performed by: NURSE PRACTITIONER

## 2022-05-03 PROCEDURE — G0438 PPPS, INITIAL VISIT: HCPCS | Performed by: NURSE PRACTITIONER

## 2022-05-03 PROCEDURE — G8427 DOCREV CUR MEDS BY ELIG CLIN: HCPCS | Performed by: NURSE PRACTITIONER

## 2022-05-03 PROCEDURE — 99214 OFFICE O/P EST MOD 30 MIN: CPT | Performed by: NURSE PRACTITIONER

## 2022-05-03 PROCEDURE — 3017F COLORECTAL CA SCREEN DOC REV: CPT | Performed by: NURSE PRACTITIONER

## 2022-05-03 PROCEDURE — 1090F PRES/ABSN URINE INCON ASSESS: CPT | Performed by: NURSE PRACTITIONER

## 2022-05-03 PROCEDURE — G8417 CALC BMI ABV UP PARAM F/U: HCPCS | Performed by: NURSE PRACTITIONER

## 2022-05-03 PROCEDURE — 4004F PT TOBACCO SCREEN RCVD TLK: CPT | Performed by: NURSE PRACTITIONER

## 2022-05-03 RX ORDER — PRAMIPEXOLE DIHYDROCHLORIDE 0.5 MG/1
TABLET ORAL
Qty: 270 TABLET | Refills: 3 | Status: SHIPPED | OUTPATIENT
Start: 2022-05-03 | End: 2022-05-03 | Stop reason: SDUPTHER

## 2022-05-03 RX ORDER — PRAMIPEXOLE DIHYDROCHLORIDE 0.5 MG/1
TABLET ORAL
Qty: 90 TABLET | Refills: 0 | Status: SHIPPED | OUTPATIENT
Start: 2022-05-03 | End: 2022-05-26

## 2022-05-03 ASSESSMENT — ENCOUNTER SYMPTOMS
COUGH: 0
ABDOMINAL PAIN: 0
SHORTNESS OF BREATH: 0
NAUSEA: 0

## 2022-05-03 ASSESSMENT — PATIENT HEALTH QUESTIONNAIRE - PHQ9
9. THOUGHTS THAT YOU WOULD BE BETTER OFF DEAD, OR OF HURTING YOURSELF: 0
SUM OF ALL RESPONSES TO PHQ9 QUESTIONS 1 & 2: 0
2. FEELING DOWN, DEPRESSED OR HOPELESS: 0
10. IF YOU CHECKED OFF ANY PROBLEMS, HOW DIFFICULT HAVE THESE PROBLEMS MADE IT FOR YOU TO DO YOUR WORK, TAKE CARE OF THINGS AT HOME, OR GET ALONG WITH OTHER PEOPLE: 0
SUM OF ALL RESPONSES TO PHQ QUESTIONS 1-9: 0
3. TROUBLE FALLING OR STAYING ASLEEP: 0
5. POOR APPETITE OR OVEREATING: 0
7. TROUBLE CONCENTRATING ON THINGS, SUCH AS READING THE NEWSPAPER OR WATCHING TELEVISION: 0
8. MOVING OR SPEAKING SO SLOWLY THAT OTHER PEOPLE COULD HAVE NOTICED. OR THE OPPOSITE, BEING SO FIGETY OR RESTLESS THAT YOU HAVE BEEN MOVING AROUND A LOT MORE THAN USUAL: 0
SUM OF ALL RESPONSES TO PHQ QUESTIONS 1-9: 0
1. LITTLE INTEREST OR PLEASURE IN DOING THINGS: 0
4. FEELING TIRED OR HAVING LITTLE ENERGY: 0
SUM OF ALL RESPONSES TO PHQ QUESTIONS 1-9: 0
6. FEELING BAD ABOUT YOURSELF - OR THAT YOU ARE A FAILURE OR HAVE LET YOURSELF OR YOUR FAMILY DOWN: 0
SUM OF ALL RESPONSES TO PHQ QUESTIONS 1-9: 0

## 2022-05-03 ASSESSMENT — LIFESTYLE VARIABLES: HOW OFTEN DO YOU HAVE A DRINK CONTAINING ALCOHOL: NEVER

## 2022-05-03 NOTE — PROGRESS NOTES
Subjective:      Patient ID: Lima Lentz [id number] is a 78 y.o. female here for evaluation. Chief Complaint   Patient presents with    Medicare AWV    Medication Refill    Ear Fullness     on going right side        Seen ENT for right ear hearing loss. Cerumen removed. Last seen November 2021. Had hearing testing October 2021. Due for repeat of Audiology testing to follow up on. Patient Active Problem List   Diagnosis    Coronary artery disease involving native coronary artery of native heart without angina pectoris    S/P angioplasty with stent-1995 and 1996- in [address] Pure hypercholesterolemia    Tobacco abuse    Cardiomyopathy ([address] EF 40%    Acute CVA (cerebrovascular accident) ([address]    Mild intermittent asthma without complication    Recurrent major depressive disorder, in full remission ([address]    Paroxysmal atrial fibrillation ([facility]       COLONOSCOPY - denied  MAMMO - denied  DEXA - denied  CT LUNG - denied    CARDIO - Dr. Leodan Koenig   ENT - Dr. Tanya Quiros    Denies CP, SOB or chest tightness. AFIB. On Eliquis and Amiodorone. On STATIN. Was admitted to [facility] on 8/10/21 for acute CVA. Slurred speech and right side weakness. Improvement in weakness and speech. Minimal residual effect. Still smoking 1/2 ppd.   Denied quitting    BP Readings from Last 3 Encounters:   05/03/22 118/78   11/23/21 114/70   11/16/21 (!) 102/58       Labs reviewed    Lab Results   Component Value Date    LABA1C 5.7 07/08/2020    LABA1C 5.3 09/30/2016     Lab Results   Component Value Date     07/08/2020       No components found for: CHLPL  Lab Results   Component Value Date    TRIG 150 02/05/2021    TRIG 102 01/15/2020    TRIG 195 10/22/2018     Lab Results   Component Value Date    HDL 52 02/05/2021    HDL 45 01/15/2020    HDL 43 10/22/2018     Lab Results   Component Value Date    LDLCALC 77 01/15/2020    [address] 10/22/2018 LDLCALC 102 09/30/2016     No results found for: LABVLDL      Chemistry        Component Value Date/Time     08/10/2021 1411    K 4.1 08/10/2021 1411     08/10/2021 1411    CO2 25 08/10/2021 1411    BUN 14 08/10/2021 1411    CREATININE 0.85 08/10/2021 1411        Component Value Date/Time    CALCIUM 8.60 (L) 08/10/2021 1411    ALKPHOS 108 08/10/2021 1411    AST 16 08/10/2021 1411    ALT 19 08/10/2021 1411    BILITOT 0.3 08/10/2021 1411            Lab Results   Component Value Date    TSH 2.880 09/30/2016       Lab Results   Component Value Date    WBC 8.8 08/10/2021    HGB 13.9 08/10/2021    HCT 42.7 08/10/2021    MCV 91.0 08/10/2021     08/10/2021         Health Maintenance   Topic Date Due    Hepatitis C screen  Never done    Colorectal Cancer Screen  Never done    Low dose CT lung screening  Never done    DEXA (modify frequency per FRAX score)  Never done    Breast cancer screen  11/16/2020    Pneumococcal 65+ years Vaccine (2 - PCV) 02/18/2021    A1C test (Diabetic or Prediabetic)  07/08/2021    COVID-19 Vaccine (3 - Booster for Pfizer series) 10/04/2021    Depression Monitoring  01/15/2022    Lipids  02/05/2022    TSH  02/05/2022    Annual Wellness Visit (AWV)  05/04/2023    DTaP/Tdap/Td vaccine (3 - Td or Tdap) 11/11/2027    Flu vaccine  Completed    Shingles vaccine  Completed    Hepatitis A vaccine  Aged Out    Hepatitis B vaccine  Aged Out    Hib vaccine  Aged Out    Meningococcal (ACWY) vaccine  Aged Out       Immunization History   Administered Date(s) Administered    COVID-19, Pfizer Purple top, DILUTE for use, 12+ yrs, 30mcg/0.3mL dose 04/14/2021, 05/04/2021    Influenza Virus Vaccine 02/18/2020    Influenza, Dolan Pulse, IM, (6 mo and older Fluzone, Flulaval, Fluarix and 3 yrs and older Afluria) 10/05/2017, 10/08/2018    Influenza, Quadv, adjuvanted, 65 yrs +, IM, PF (Fluad) 10/14/2020, 11/16/2021    Pneumococcal Polysaccharide ([id number]) 10/05/2017, 02/18/2020    Td, (Adult) Not Adsorbed 11/11/2017    Tdap (Boostrix, Adacel) 10/05/2017    Zoster Live (Zostavax) 10/05/2017    Zoster Recombinant (Shingrix) 10/08/2018, 02/04/2019       Review of Systems   Constitutional: Negative for chills and fever. HENT: Positive for hearing loss. Respiratory: Negative for cough and shortness of breath. Cardiovascular: Negative for chest pain. Gastrointestinal: Negative for abdominal pain and nausea. Skin: Negative for rash. Neurological: Positive for speech difficulty and weakness. Negative for dizziness, light-headedness and headaches. Psychiatric/Behavioral: Positive for decreased concentration. Negative for confusion. Objective:   Physical Exam  Constitutional:       General: She is not in acute distress. HENT:      Right Ear: There is no impacted cerumen. Eyes:      Pupils: Pupils are equal, round, and reactive to light. Cardiovascular:      Rate and Rhythm: Normal rate and regular rhythm. Heart sounds: No murmur heard. Pulmonary:      Effort: Pulmonary effort is normal.      Breath sounds: Normal breath sounds. No wheezing. Abdominal:      General: Bowel sounds are normal. There is no distension. Palpations: Abdomen is soft. Tenderness: There is no abdominal tenderness. Musculoskeletal:         General: No tenderness. Normal range of motion. Cervical back: Normal range of motion and neck supple. Skin:     General: Skin is warm and dry. Findings: No rash. Psychiatric:         Attention and Perception: Attention normal.         Mood and Affect: Mood normal.         Speech: Speech is delayed. Behavior: Behavior normal. Behavior is cooperative. Thought Content: Thought content normal.         Cognition and Memory: Cognition normal.         Judgment: Judgment normal.          Assessment:       Diagnosis Orders   1. Initial Medicare annual wellness visit     2.  Recurrent major depressive disorder, in full remission (HCC)  sertraline (ZOLOFT) 50 MG tablet    DISCONTINUED: sertraline (ZOLOFT) 50 MG tablet   3. RLS (restless legs syndrome)  pramipexole (MIRAPEX) 0.5 MG tablet    DISCONTINUED: pramipexole (MIRAPEX) 0.5 MG tablet   4. Dilated cardiomyopathy (HCC)     5. Paroxysmal atrial fibrillation ([address]     6. Coronary artery disease involving native coronary artery of native heart without angina pectoris     7. S/P coronary angioplasty     8. History of cerebrovascular accident (CVA) with residual deficit     9. 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 Tobacco abuse  CT LUNG SCREENING   11. Pure hypercholesterolemia  Lipid Panel   12. IFG (impaired fasting glucose)  Hemoglobin A1c    CBC    Comprehensive Metabolic Panel   13. Sensorineural hearing loss (SNHL) of right ear, unspecified hearing status on contralateral side  Audiometry with tympanometry    [facility] - [facility]. [facility]   14. Breast cancer screening by mammogram  KIKE DIGITAL SCREEN W OR WO CAD BILATERAL   15.  Post-menopausal  MAMMO DEXA BONE DENSITY SCAN           Plan:      Chronic conditions stable  Labs as above to monitor  Follow up with Specialists   - refer back to AUDIO for repeat Testing  Refills as above   - increase Mirapex 0.5 mg AM and 1 mg PM  Smoking cessation - denied  CRS options discussed - defer for now  MAMMO, DEXA and CT LUNG Screen ordered  Immunizations discussed   RTO in 3 months            Current Outpatient Medications   Medication Sig Dispense Refill    sertraline (ZOLOFT) 50 MG tablet TAKE ONE TABLET BY MOUTH EVERY DAY 30 tablet 0    pramipexole (MIRAPEX) 0.5 MG tablet TAKE ONE TABLET BY MOUTH in AM and 2 TABLETS HS 90 tablet 0    fluticasone (FLONASE) 50 MCG/ACT nasal spray 2 sprays by Nasal route 2 times daily 3 each 3    clopidogrel (PLAVIX) 75 MG tablet TAKE 1 TABLET BY MOUTH EVERY DAY 90 tablet 3    apixaban (ELIQUIS) 5 MG TABS tablet TAKE 1 TABLET BY MOUTH TWICE A  tablet 3    amiodarone (CORDARONE) 200 MG tablet Take 1 tablet by mouth daily      isosorbide mononitrate (IMDUR) 30 MG extended release tablet Take 1 tablet by mouth daily      melatonin 3 MG TABS tablet Take 1 tablet by mouth nightly as needed      nitroGLYCERIN (NITROSTAT) 0.4 MG SL tablet Place 1 tablet under the tongue as needed      ranolazine (RANEXA) 500 MG extended release tablet Take 1 tablet by mouth 2 times daily      ticagrelor (BRILINTA) 90 MG TABS tablet Take 1 tablet by mouth 2 times daily      atorvastatin (LIPITOR) 80 MG tablet Take 1 tablet by mouth daily      metoprolol tartrate (LOPRESSOR) 25 MG tablet TAKE 1 TABLET BY MOUTH TWICE A  tablet 3    rOPINIRole (REQUIP) 3 MG tablet TAKE ONE TABLET BY MOUTH EVERY DAY AT NIGHT 90 tablet 3    Multiple Vitamins-Minerals (MULTI-VITAMIN GUMMIES PO) Take by mouth      aspirin 81 MG tablet Take 1 tablet by mouth daily 90 tablet 3     No current facility-administered medications for this visit.

## 2022-05-03 NOTE — PROGRESS NOTES
Medicare Annual Wellness Visit    Angelita Martinez is here for Medicare AWV, Medication Refill, and Ear Fullness (on going right side )    Assessment & Plan   Post-menopausal  Recurrent major depressive disorder, in full remission ([facility]  The following orders have not been finalized:  -     sertraline (ZOLOFT) 50 MG tablet  Initial Medicare annual wellness visit      Recommendations for Preventive Services Due: see orders and patient instructions/AVS.  Recommended screening schedule for the next 5-10 years is provided to the patient in written form: see Patient Instructions/AVS.     Return for Medicare Annual Wellness Visit in 1 year. Subjective     Patient's complete Health Risk Assessment and screening values have been reviewed and are found in Flowsheets. The following problems were reviewed today and where indicated follow up appointments were made and/or referrals ordered.     Positive Risk Factor Screenings with Interventions:         Tobacco Use:     Tobacco Use: High Risk    Smoking Tobacco Use: Current Every Day Smoker    Smokeless Tobacco Use: Never Used     E-Cigarettes/Vaping Use     Questions Responses    E-Cigarette/Vaping Use Current Some Day User    Start Date     Passive Exposure     Quit Date     Counseling Given     Comments Mid-Size or Vape Pen        Substance Use - Tobacco Interventions:  tobacco cessation tips and resources provided           General Health and ACP:  General  In general, how would you say your health is?: Good  In the past 7 days, have you experienced any of the following: New or Increased Pain, New or Increased Fatigue, Loneliness, Social Isolation, Stress or Anger?: No  Do you get the social and emotional support that you need?: Yes  Do you have a Living Will?: (!) No    Advance Directives     Power of  Living Will ACP-Advance Directive ACP-Power of     Not on File Not on File Not on File Not on File      General Health Risk Interventions:  · No Living Will: Advance Care Planning addressed with patient today              Objective   Vitals:    05/03/22 1247   BP: 118/78   Pulse: 61   Resp: 16   SpO2: 97%   Weight: 158 lb 1.6 oz (71.7 kg)   Height: 5' 3\" (1.6 m)      Body mass index is 28.01 kg/m². No Known Allergies  Prior to Visit Medications    Medication Sig Taking?  Authorizing Provider   fluticasone (FLONASE) 50 MCG/ACT nasal spray 2 sprays by Nasal route 2 times daily  Tamara Ganser, APRN - CNP   sertraline (ZOLOFT) 50 MG tablet TAKE ONE TABLET BY MOUTH EVERY DAY  Tamara Ganser, APRN - CNP   pramipexole (MIRAPEX) 0.5 MG tablet TAKE ONE TABLET BY MOUTH TWICE A DAY  Tamara Ganser, APRN - CNP   clopidogrel (PLAVIX) 75 MG tablet TAKE 1 TABLET BY MOUTH EVERY DAY  Tamara Ganser, APRN - CNP   apixaban (ELIQUIS) 5 MG TABS tablet TAKE 1 TABLET BY MOUTH TWICE A DAY  Tamara Ganser, APRN - CNP   amiodarone (CORDARONE) 200 MG tablet Take 1 tablet by mouth daily  Historical Provider, MD   isosorbide mononitrate (IMDUR) 30 MG extended release tablet Take 1 tablet by mouth daily  Historical Provider, MD   melatonin 3 MG TABS tablet Take 1 tablet by mouth nightly as needed  Historical Provider, MD   nitroGLYCERIN (NITROSTAT) 0.4 MG SL tablet Place 1 tablet under the tongue as needed  Historical Provider, MD   ranolazine (RANEXA) 500 MG extended release tablet Take 1 tablet by mouth 2 times daily  Historical Provider, MD   ticagrelor (BRILINTA) 90 MG TABS tablet Take 1 tablet by mouth 2 times daily  Historical Provider, MD   atorvastatin (LIPITOR) 80 MG tablet Take 1 tablet by mouth daily  Tamara Ganser, APRN - CNP   metoprolol tartrate (LOPRESSOR) 25 MG tablet TAKE 1 TABLET BY MOUTH TWICE A DAY  Tamara Ganser, APRN - CNP   rOPINIRole (REQUIP) 3 MG tablet TAKE ONE TABLET BY MOUTH EVERY DAY AT NIGHT  Tamara Ganser, APRN - CNP   Multiple Vitamins-Minerals (MULTI-VITAMIN GUMMIES PO) Take by mouth  Historical Provider, MD   aspirin 81 MG tablet Take 1 tablet by mouth daily  JANIA Dahl CNP       CareTeam (Including outside providers/suppliers regularly involved in providing care):   Patient Care Team:  JANIA Dahl CNP as PCP - General (Certified Nurse Practitioner)  JANIA Dahl CNP as PCP - [facility] Empaneled Provider  Radha Elizabeth MD as Consulting Physician (Cardiology)    Reviewed and updated this visit:  Tobacco  Allergies  Meds  Med Hx  Surg Hx  Soc Hx  Fam Hx

## 2022-05-03 NOTE — PROGRESS NOTES
Patient prefers testing to be done at [facility]- orders faxed to [facility] at [phone]. Patient aware that [facility] will call her to schedule.

## 2022-05-03 NOTE — PATIENT INSTRUCTIONS
Personalized Preventive Plan for Tonia Kwon - 5/3/2022  Medicare offers a range of preventive health benefits. Some of the tests and screenings are paid in full while other may be subject to a deductible, co-insurance, and/or copay. Some of these benefits include a comprehensive review of your medical history including lifestyle, illnesses that may run in your family, and various assessments and screenings as appropriate. After reviewing your medical record and screening and assessments performed today your provider may have ordered immunizations, labs, imaging, and/or referrals for you. A list of these orders (if applicable) as well as your Preventive Care list are included within your After Visit Summary for your review. Other Preventive Recommendations:    · A preventive eye exam performed by an eye specialist is recommended every 1-2 years to screen for glaucoma; cataracts, macular degeneration, and other eye disorders. · A preventive dental visit is recommended every 6 months. · Try to get at least 150 minutes of exercise per week or 10,000 steps per day on a pedometer . · Order or download the FREE \"Exercise & Physical Activity: Your Everyday Guide\" from [employer]. Call 6-922.203.5794 or search [employer] online. · You need 0168-1434 mg of calcium and 6732-4353 IU of vitamin D per day. It is possible to meet your calcium requirement with diet alone, but a vitamin D supplement is usually necessary to meet this goal.  · When exposed to the sun, use a sunscreen that protects against both UVA and UVB radiation with an SPF of 30 or greater. Reapply every 2 to 3 hours or after sweating, drying off with a towel, or swimming. · Always wear a seat belt when traveling in a car. Always wear a helmet when riding a bicycle or motorcycle.

## 2022-05-09 ENCOUNTER — TELEPHONE (OUTPATIENT)
Dept: FAMILY MEDICINE CLINIC | Age: 67
End: 2022-05-09

## 2022-05-09 DIAGNOSIS — Z87.891 PERSONAL HISTORY OF TOBACCO USE, PRESENTING HAZARDS TO HEALTH: Primary | ICD-10-CM

## 2022-05-09 NOTE — TELEPHONE ENCOUNTER
Spoke with patient she would like to go to [facility] for CT lung screen. [facility] order received and faxed to [facility] at [phone].

## 2022-05-09 NOTE — TELEPHONE ENCOUNTER
[facility] CS called asking if you will change CT lung screen dx to a passing code of Z87.891 and fax them a new order at [phone].

## 2022-05-17 ENCOUNTER — HOSPITAL ENCOUNTER (OUTPATIENT)
Dept: AUDIOLOGY | Age: 67
Discharge: HOME | End: 2022-05-17
Payer: MEDICARE

## 2022-05-17 PROCEDURE — 92567 TYMPANOMETRY: CPT | Performed by: AUDIOLOGIST

## 2022-05-17 PROCEDURE — 92557 COMPREHENSIVE HEARING TEST: CPT | Performed by: AUDIOLOGIST

## 2022-05-17 NOTE — PROGRESS NOTES
AUDIOLOGICAL EVALUATION      REASON FOR TESTING:  Audiometric re-evaluation per the request of MEGAN Patel, due to the diagnosis of hearing loss, worse in the right ear. A previous audiogram on 10/26/2021 indicated a mild sloping to moderately-severe sensorineural hearing loss in the left ear and a mild to profound mixed hearing loss in the right ear. The patient continues to report hearing loss and aural fullness in the right ear as well as tinnitus. She denies any current otalgia or otorrhea. Her history is significant for a stroke in August 2021 which she reports affected her right side. OTOSCOPY: Clear external ear canals and visible tympanic membranes, bilaterally. AUDIOGRAM            Reliability: Good  Audiometer Used:  C[employer]fo Audiostar Pro        COMMENTS: Mild to moderate sensorineural hearing loss in the left ear and a moderate to severe mixed hearing loss in the right ear. Air-bone gap in the right ear at 4000Hz only. Significant asymmetry noted in the right ear. Speech reception thresholds in agreement with pure tone averages, bilaterally. Word recognition scores are excellent at 96% in the right ear and 100% in the left ear, with speech presented at elevated but comfortable listening levels in quiet. Tympanometry on the left indicated a normal ear canal volume (1.6ml), peak pressure (-27daPa) and compliance (0.90ml). The right tympanogram showed a normal canal volume (1.5ml) and peak pressure (-87daPa) with slightly reduced compliance (0.26ml). RECOMMENDATION(S):   1. Follow up with referring provider as planned. Decreased hearing sensitivity noted in both ears since previous audiogram on 10/26/21.  2. Consider further testing/imaging studies, if not previously performed, to rule out possible retrocochlear pathology, per discretion of physician. 3. Repeat testing as medically indicated or in 6 months to rule out progression.   4. Consider binaural amplification pending medical clearance and patient motivation.

## 2022-05-19 ENCOUNTER — HOSPITAL ENCOUNTER (OUTPATIENT)
Dept: WOMENS IMAGING | Age: 67
Discharge: HOME | End: 2022-05-19
Payer: MEDICARE

## 2022-05-19 ENCOUNTER — APPOINTMENT (OUTPATIENT)
Dept: CT IMAGING | Age: 67
End: 2022-05-19
Payer: MEDICARE

## 2022-05-19 DIAGNOSIS — Z78.0 POST-MENOPAUSAL: ICD-10-CM

## 2022-05-19 DIAGNOSIS — Z12.31 BREAST CANCER SCREENING BY MAMMOGRAM: ICD-10-CM

## 2022-05-19 PROCEDURE — 77080 DXA BONE DENSITY AXIAL: CPT

## 2022-05-19 PROCEDURE — 77063 BREAST TOMOSYNTHESIS BI: CPT

## 2022-05-20 DIAGNOSIS — G47.9 SLEEP DISORDER: ICD-10-CM

## 2022-05-20 NOTE — TELEPHONE ENCOUNTER
Patient requesting refill of Requip to [facility] [place].   Please refill if appropriate

## 2022-05-23 RX ORDER — ROPINIROLE 3 MG/1
TABLET, FILM COATED ORAL
Qty: 90 TABLET | Refills: 3 | Status: SHIPPED | OUTPATIENT
Start: 2022-05-23

## 2022-05-24 ENCOUNTER — TELEPHONE (OUTPATIENT)
Dept: FAMILY MEDICINE CLINIC | Age: 67
End: 2022-05-24

## 2022-05-24 NOTE — TELEPHONE ENCOUNTER
Pt called office requesting test results of her hearing test. [facility] did not go over the results with her. Please advise.

## 2022-05-26 DIAGNOSIS — G25.81 RLS (RESTLESS LEGS SYNDROME): ICD-10-CM

## 2022-05-26 DIAGNOSIS — F33.42 RECURRENT MAJOR DEPRESSIVE DISORDER, IN FULL REMISSION (HCC): ICD-10-CM

## 2022-05-26 RX ORDER — PRAMIPEXOLE DIHYDROCHLORIDE 0.5 MG/1
TABLET ORAL
Qty: 90 TABLET | Refills: 2 | Status: SHIPPED | OUTPATIENT
Start: 2022-05-26 | End: 2022-08-31

## 2022-06-06 ENCOUNTER — TELEPHONE (OUTPATIENT)
Dept: FAMILY MEDICINE CLINIC | Age: 67
End: 2022-06-06

## 2022-06-06 RX ORDER — LOPERAMIDE HYDROCHLORIDE 2 MG/1
2 CAPSULE ORAL 4 TIMES DAILY PRN
Qty: 60 CAPSULE | Refills: 1 | Status: SHIPPED | OUTPATIENT
Start: 2022-06-06 | End: 2022-06-16

## 2022-06-06 NOTE — TELEPHONE ENCOUNTER
----- Message from Eve Freedman sent at 6/6/2022 10:02 AM EDT -----  Subject: Message to Provider    QUESTIONS  Information for Provider? Pt requesting a prescription to help with their   diarrhea. Please advise.   ---------------------------------------------------------------------------  --------------  CALL BACK INFO  What is the best way for the office to contact you? OK to leave message on   voicemail  Preferred Call Back Phone Number? [phone]  ---------------------------------------------------------------------------  --------------  SCRIPT ANSWERS  Relationship to Patient?  Self

## 2022-07-27 NOTE — TELEPHONE ENCOUNTER
The patient called in requesting a refill on her metoprolol 25mg to be sent to [facility]. Order pended for your signature. If no call back the patient will check with her pharmacy.

## 2022-08-16 PROBLEM — Z95.0 HISTORY OF CARDIAC PACEMAKER IN SITU: Status: ACTIVE | Noted: 2022-08-16

## 2022-08-16 PROBLEM — S80.00XA CONTUSION OF KNEE: Status: ACTIVE | Noted: 2022-08-16

## 2022-08-16 PROBLEM — I49.5 SICK SINUS SYNDROME (HCC): Status: ACTIVE | Noted: 2022-08-16

## 2022-08-16 PROBLEM — Z95.5 STENTED CORONARY ARTERY: Status: ACTIVE | Noted: 2022-08-16

## 2022-08-16 PROBLEM — G25.81 RESTLESS LEGS SYNDROME: Status: ACTIVE | Noted: 2022-08-16

## 2022-08-16 PROBLEM — R07.9 CHEST PAIN: Status: ACTIVE | Noted: 2022-08-16

## 2022-08-16 PROBLEM — U07.1 COVID-19: Status: ACTIVE | Noted: 2022-08-16

## 2022-08-16 PROBLEM — R42 LIGHTHEADEDNESS: Status: ACTIVE | Noted: 2022-08-16

## 2022-08-16 PROBLEM — E78.5 HYPERLIPIDEMIA: Status: ACTIVE | Noted: 2022-08-16

## 2022-08-16 PROBLEM — R94.39 ABNORMAL CARDIOVASCULAR STRESS TEST: Status: ACTIVE | Noted: 2022-08-16

## 2022-08-16 PROBLEM — I10 HYPERTENSION: Status: ACTIVE | Noted: 2022-08-16

## 2022-08-16 PROBLEM — Z95.0 PRESENCE OF CARDIAC PACEMAKER: Status: ACTIVE | Noted: 2022-08-16

## 2022-08-16 PROBLEM — I20.0 UNSTABLE ANGINA (HCC): Status: ACTIVE | Noted: 2022-08-16

## 2022-08-31 DIAGNOSIS — F33.42 RECURRENT MAJOR DEPRESSIVE DISORDER, IN FULL REMISSION (HCC): ICD-10-CM

## 2022-08-31 DIAGNOSIS — G25.81 RLS (RESTLESS LEGS SYNDROME): ICD-10-CM

## 2022-08-31 RX ORDER — PRAMIPEXOLE DIHYDROCHLORIDE 0.5 MG/1
TABLET ORAL
Qty: 270 TABLET | Refills: 3 | Status: SHIPPED | OUTPATIENT
Start: 2022-08-31

## 2022-11-16 ENCOUNTER — OFFICE VISIT (OUTPATIENT)
Dept: FAMILY MEDICINE CLINIC | Age: 67
End: 2022-11-16
Payer: MEDICARE

## 2022-11-16 VITALS
RESPIRATION RATE: 16 BRPM | HEART RATE: 76 BPM | SYSTOLIC BLOOD PRESSURE: 102 MMHG | BODY MASS INDEX: 27.76 KG/M2 | DIASTOLIC BLOOD PRESSURE: 64 MMHG | WEIGHT: 156.7 LBS

## 2022-11-16 DIAGNOSIS — E78.00 PURE HYPERCHOLESTEROLEMIA: ICD-10-CM

## 2022-11-16 DIAGNOSIS — F33.42 RECURRENT MAJOR DEPRESSIVE DISORDER, IN FULL REMISSION (HCC): ICD-10-CM

## 2022-11-16 DIAGNOSIS — Z23 NEED FOR PNEUMOCOCCAL VACCINATION: ICD-10-CM

## 2022-11-16 DIAGNOSIS — I69.30 HISTORY OF CEREBROVASCULAR ACCIDENT (CVA) WITH RESIDUAL DEFICIT: ICD-10-CM

## 2022-11-16 DIAGNOSIS — I42.0 DILATED CARDIOMYOPATHY (HCC): ICD-10-CM

## 2022-11-16 DIAGNOSIS — I49.5 SICK SINUS SYNDROME (HCC): ICD-10-CM

## 2022-11-16 DIAGNOSIS — I25.10 CORONARY ARTERY DISEASE INVOLVING NATIVE CORONARY ARTERY OF NATIVE HEART WITHOUT ANGINA PECTORIS: ICD-10-CM

## 2022-11-16 DIAGNOSIS — I50.22 CHRONIC SYSTOLIC (CONGESTIVE) HEART FAILURE (HCC): ICD-10-CM

## 2022-11-16 DIAGNOSIS — Z98.61 S/P CORONARY ANGIOPLASTY: ICD-10-CM

## 2022-11-16 DIAGNOSIS — Z87.891 PERSONAL HISTORY OF TOBACCO USE, PRESENTING HAZARDS TO HEALTH: ICD-10-CM

## 2022-11-16 DIAGNOSIS — I69.319 RESIDUAL COGNITIVE DEFICIT AS LATE EFFECT OF CEREBROVASCULAR ACCIDENT: ICD-10-CM

## 2022-11-16 DIAGNOSIS — I48.0 PAROXYSMAL ATRIAL FIBRILLATION (HCC): Primary | ICD-10-CM

## 2022-11-16 DIAGNOSIS — Z23 NEED FOR INFLUENZA VACCINATION: ICD-10-CM

## 2022-11-16 PROCEDURE — 3017F COLORECTAL CA SCREEN DOC REV: CPT | Performed by: NURSE PRACTITIONER

## 2022-11-16 PROCEDURE — G8484 FLU IMMUNIZE NO ADMIN: HCPCS | Performed by: NURSE PRACTITIONER

## 2022-11-16 PROCEDURE — 90677 PCV20 VACCINE IM: CPT | Performed by: NURSE PRACTITIONER

## 2022-11-16 PROCEDURE — G8427 DOCREV CUR MEDS BY ELIG CLIN: HCPCS | Performed by: NURSE PRACTITIONER

## 2022-11-16 PROCEDURE — 4004F PT TOBACCO SCREEN RCVD TLK: CPT | Performed by: NURSE PRACTITIONER

## 2022-11-16 PROCEDURE — 1123F ACP DISCUSS/DSCN MKR DOCD: CPT | Performed by: NURSE PRACTITIONER

## 2022-11-16 PROCEDURE — G0009 ADMIN PNEUMOCOCCAL VACCINE: HCPCS | Performed by: NURSE PRACTITIONER

## 2022-11-16 PROCEDURE — G8417 CALC BMI ABV UP PARAM F/U: HCPCS | Performed by: NURSE PRACTITIONER

## 2022-11-16 PROCEDURE — 1090F PRES/ABSN URINE INCON ASSESS: CPT | Performed by: NURSE PRACTITIONER

## 2022-11-16 PROCEDURE — 3074F SYST BP LT 130 MM HG: CPT | Performed by: NURSE PRACTITIONER

## 2022-11-16 PROCEDURE — G8399 PT W/DXA RESULTS DOCUMENT: HCPCS | Performed by: NURSE PRACTITIONER

## 2022-11-16 PROCEDURE — 3078F DIAST BP <80 MM HG: CPT | Performed by: NURSE PRACTITIONER

## 2022-11-16 PROCEDURE — 90694 VACC AIIV4 NO PRSRV 0.5ML IM: CPT | Performed by: NURSE PRACTITIONER

## 2022-11-16 PROCEDURE — G0008 ADMIN INFLUENZA VIRUS VAC: HCPCS | Performed by: NURSE PRACTITIONER

## 2022-11-16 PROCEDURE — 99214 OFFICE O/P EST MOD 30 MIN: CPT | Performed by: NURSE PRACTITIONER

## 2022-11-16 RX ORDER — RANOLAZINE 500 MG/1
500 TABLET, EXTENDED RELEASE ORAL 2 TIMES DAILY
Qty: 180 TABLET | Refills: 3 | Status: SHIPPED | OUTPATIENT
Start: 2022-11-16

## 2022-11-16 RX ORDER — ISOSORBIDE MONONITRATE 30 MG/1
30 TABLET, EXTENDED RELEASE ORAL DAILY
Qty: 90 TABLET | Refills: 3 | Status: SHIPPED | OUTPATIENT
Start: 2022-11-16

## 2022-11-16 RX ORDER — CLOPIDOGREL BISULFATE 75 MG/1
TABLET ORAL
Qty: 90 TABLET | Refills: 3 | Status: SHIPPED | OUTPATIENT
Start: 2022-11-16

## 2022-11-16 RX ORDER — ATORVASTATIN CALCIUM 80 MG/1
80 TABLET, FILM COATED ORAL DAILY
Qty: 90 TABLET | Refills: 3 | Status: SHIPPED | OUTPATIENT
Start: 2022-11-16

## 2022-11-16 ASSESSMENT — ENCOUNTER SYMPTOMS
ABDOMINAL PAIN: 0
NAUSEA: 0
COUGH: 0
SHORTNESS OF BREATH: 0

## 2022-11-16 NOTE — PATIENT INSTRUCTIONS
You may receive a survey regarding the care you received during your visit. Your input is valuable to us. We encourage you to complete and return your survey. We hope you will choose us in the future for your healthcare needs. Tobacco Cessation Programs     Telephonic behavior modification  1-800-QUIT-NOW (851-0129)  Counseling service for those who are ready to quit using tobacco.    Available for uninsured [place] residents, Medicaid recipients, pregnant women, or patients whose health plans or employers are members of the [address] [place] [address] behavior modification  http://[place]. [employer]uit[employer]. org  Online support program to help patients through each step of the quitting process. Available 24 hours a day 7 days a week. Provides up to date researched based tool, step-by-step guides, and motivational messages. Online behavior modification  www.lungusa.org/stop-smoking/how-to-quit  HelpLine: 1-800-LUNG-[place] (484-1573)  Email questions to:  Mago@[employer]. org   Website offers resources to help tobacco users figure out their reasons for quitting and then take the big step of quitting for good. Hypnosis  Location: [facility] [address]  Contact: Felecia Nunes, PhD at [phone]  Hypnosis for tobacco cessation  Cost $225 for the initial session and $175 for each session afterwards. Most patients require 6-8 sessions. There is the option to submit through the patients insurance. Hypnosis and behavior modification  Location: [place] [address],  [facility] [address] [place] [address]  Contact: Angel Escalona, PhD at [phone]  Counseling and hypnosis for nicotine addition  Cost: For uninsured patients:  Please call above phone number  Cost for insured patients depends on patients insurance plan.     Behavior modification  Location: [address] [facility], [address] [place] [address] 50: [phone]  Services include four one-on-one appointments between the patient and a respiratory therapist.  The four appointments span over three weeks. The respiratory therapist schedules one of the appointments to occur 48 hours after the patients quit date. Cost $100 total for the four sessions.   Tobacco cessation products are not included in the cost and are not provided by [facility] [place].

## 2022-11-16 NOTE — PROGRESS NOTES
Subjective:      Patient ID: George Hassan [id number] is a 75 y.o. female here for evaluation. Chief Complaint   Patient presents with    6 Month Follow-Up    Medication Refill       Patient Active Problem List   Diagnosis    Coronary artery disease involving native coronary artery of native heart without angina pectoris    S/P angioplasty with stent-1995 and 1996- in [facility]    Pure hypercholesterolemia    Tobacco abuse    Cardiomyopathy ([facility] EF 40%    Acute CVA (cerebrovascular accident) ([facility]    Mild intermittent asthma without complication    Recurrent major depressive disorder, in full remission ([facility]    Paroxysmal atrial fibrillation (HCC)    Abnormal cardiovascular stress test    Chest pain    Contusion of knee    COVID-19    History of cardiac pacemaker in situ    Hyperlipidemia    Hypertension    Lightheadedness    Presence of cardiac pacemaker    Restless legs syndrome    Sick sinus syndrome (HCC)    Stented coronary artery    Unstable angina (HCC)    Chronic systolic (congestive) heart failure         COLONOSCOPY - denied  MAMMO - 2022  DEXA - 2022  CT LUNG -denied    CARDIO - Dr. Kip Esteban   ENT - Dr. Kathleen Patino    Denies CP, SOB or chest tightness. AFIB. On Eliquis and Amiodorone. On STATIN. Was admitted to [facility] on 8/10/21 for acute CVA. Slurred speech and right side weakness. Improvement in weakness and speech. Minimal residual effect. Still smoking 1/2 ppd.   Denied quitting    BP Readings from Last 3 Encounters:   11/16/22 102/64   05/03/22 118/78   11/23/21 114/70       Labs reviewed    Lab Results   Component Value Date    LABA1C 5.7 07/08/2020    LABA1C 5.3 09/30/2016     Lab Results   Component Value Date     07/08/2020       No components found for: CHLPL  Lab Results   Component Value Date    TRIG 222 (H) 10/21/2022    TRIG 150 02/05/2021    TRIG 102 01/15/2020     Lab Results   Component Value Date    HDL 46 ([id number]) 10/05/2017, 02/18/2020    Td, (Adult) Not Adsorbed 11/11/2017    Tdap (Boostrix, Adacel) 10/05/2017    Zoster Live (Zostavax) 10/05/2017    Zoster Recombinant (Shingrix) 10/08/2018, 02/04/2019       Review of Systems   Constitutional:  Negative for chills and fever. HENT:  Positive for hearing loss. Respiratory:  Negative for cough and shortness of breath. Cardiovascular:  Negative for chest pain. Gastrointestinal:  Negative for abdominal pain and nausea. Skin:  Negative for rash. Neurological:  Negative for dizziness, speech difficulty, weakness, light-headedness and headaches. Psychiatric/Behavioral:  Positive for decreased concentration. Negative for confusion. Objective:   Physical Exam  Constitutional:       General: She is not in acute distress. HENT:      Right Ear: There is no impacted cerumen. Eyes:      Pupils: Pupils are equal, round, and reactive to light. Cardiovascular:      Rate and Rhythm: Normal rate and regular rhythm. Heart sounds: No murmur heard. Pulmonary:      Effort: Pulmonary effort is normal.      Breath sounds: Normal breath sounds. No wheezing. Abdominal:      General: Bowel sounds are normal. There is no distension. Palpations: Abdomen is soft. Tenderness: There is no abdominal tenderness. Musculoskeletal:         General: No tenderness. Normal range of motion. Cervical back: Normal range of motion and neck supple. Skin:     General: Skin is warm and dry. Findings: No rash. Psychiatric:         Attention and Perception: Attention normal.         Mood and Affect: Mood normal.         Speech: Speech is delayed. Behavior: Behavior normal. Behavior is cooperative. Thought Content: Thought content normal.         Cognition and Memory: Cognition normal.         Judgment: Judgment normal.        Assessment:       Diagnosis Orders   1.  Paroxysmal atrial fibrillation (HCC)  apixaban (ELIQUIS) 5 MG TABS tablet      2. Sick sinus syndrome ([facility]        3. Coronary artery disease involving native coronary artery of native heart without angina pectoris  clopidogrel (PLAVIX) 75 MG tablet    ranolazine (RANEXA) 500 MG extended release tablet      4. S/P coronary angioplasty  clopidogrel (PLAVIX) 75 MG tablet      5. Chronic systolic (congestive) heart failure  isosorbide mononitrate (IMDUR) 30 MG extended release tablet      6. Dilated cardiomyopathy ([facility]        7. History of cerebrovascular accident (CVA) with residual deficit        8. 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 Pure hypercholesterolemia  atorvastatin (LIPITOR) 80 MG tablet    Lipid Panel    Comprehensive Metabolic Panel, Fasting    CBC      10. Recurrent major depressive disorder, in full remission ([facility]        11. Need for pneumococcal vaccination  Pneumococcal, PCV20, PREVNAR 21, (age 25 yrs+), IM, PF      12.  Need for influenza vaccination  Influenza, FLUAD, (age 72 y+), IM, Preservative Free, 0.5 mL      13. Personal history of tobacco use, presenting hazards to health                Plan:      Chronic conditions stable  Labs reviewed    - CARDIO managing subclinical hypothyroidism  Follow up with Specialists  Refills as above  Smoking cessation - denied  CRS options discussed - defer for now  CT LUNG Screen - denied  Immunizations discussed - FLU and [id number] in office  RTO in 6 months            Current Outpatient Medications   Medication Sig Dispense Refill    apixaban (ELIQUIS) 5 MG TABS tablet TAKE 1 TABLET BY MOUTH TWICE A  tablet 3    atorvastatin (LIPITOR) 80 MG tablet Take 1 tablet by mouth daily 90 tablet 3    clopidogrel (PLAVIX) 75 MG tablet TAKE 1 TABLET BY MOUTH EVERY DAY 90 tablet 3    isosorbide mononitrate (IMDUR) 30 MG extended release tablet Take 1 tablet by mouth daily 90 tablet 3    ranolazine (RANEXA) 500 MG extended release tablet Take 1 tablet by mouth 2 times daily 180 tablet 3 pramipexole (MIRAPEX) 0.5 MG tablet TAKE ONE TABLET BY MOUTH IN THE MORNING AND 2 IN THE EVENING 270 tablet 3    sertraline (ZOLOFT) 50 MG tablet TAKE 1 TABLET BY MOUTH EVERY DAY 90 tablet 3    metoprolol tartrate (LOPRESSOR) 25 MG tablet TAKE 1 TABLET BY MOUTH TWICE A  tablet 3    rOPINIRole (REQUIP) 3 MG tablet TAKE ONE TABLET BY MOUTH EVERY DAY AT NIGHT 90 tablet 3    amiodarone (CORDARONE) 200 MG tablet Take 1 tablet by mouth daily      nitroGLYCERIN (NITROSTAT) 0.4 MG SL tablet Place 1 tablet under the tongue as needed      Multiple Vitamins-Minerals (MULTI-VITAMIN GUMMIES PO) Take by mouth       No current facility-administered medications for this visit.

## 2022-11-16 NOTE — PROGRESS NOTES
Immunizations Administered       Name Date Dose Route    Influenza, FLUAD, (age 72 y+), Adjuvanted, 0.5mL 11/16/2022 0.5 mL Intramuscular    Site: Deltoid- Left    Lot: [id number]    NDC: 15760-176-66    Pneumococcal conjugate PCV20, PF (Prevnar 20) 11/16/2022 0.5 mL Intramuscular    Site: Deltoid- Right    Lot: [id number]    NDC: 8658-2470-71               After obtaining consent, and per orders of Areli Mahan CNP, injection of FLUAD 0.5ml given IM in Left deltoid by Skyler Glynn LPN. Patient instructed to report any adverse reaction to me immediately. After obtaining consent, and per orders of Areli Mahan CNP, injection of [id number] 0.5ml given IM in Right deltoid by Skyler Glynn LPN. Patient instructed to  report any adverse reaction to me immediately. Patient tolerated well and without incident. VIS's given to the patient.

## 2023-02-21 ENCOUNTER — TELEPHONE (OUTPATIENT)
Dept: FAMILY MEDICINE CLINIC | Age: 68
End: 2023-02-21

## 2023-02-21 NOTE — TELEPHONE ENCOUNTER
Pt will be having teeth extraction on 3/9/23. Pt has to DC her plavix and eliquis prior. Do you want cardiology to advise ? Please advise for a call back to pt.

## 2023-03-09 ENCOUNTER — HOSPITAL ENCOUNTER (OUTPATIENT)
Dept: NURSING | Age: 68
Discharge: HOME | End: 2023-03-09
Payer: MEDICARE

## 2023-03-09 VITALS
SYSTOLIC BLOOD PRESSURE: 122 MMHG | RESPIRATION RATE: 20 BRPM | TEMPERATURE: 97.5 F | DIASTOLIC BLOOD PRESSURE: 56 MMHG | OXYGEN SATURATION: 97 % | HEART RATE: 60 BPM

## 2023-03-09 PROCEDURE — 36000 PLACE NEEDLE IN VEIN: CPT

## 2023-03-09 PROCEDURE — 2580000003 HC RX 258: Performed by: DENTIST

## 2023-03-09 RX ORDER — SODIUM CHLORIDE 0.9 % (FLUSH) 0.9 %
10 SYRINGE (ML) INJECTION PRN
Status: DISCONTINUED | OUTPATIENT
Start: 2023-03-09 | End: 2023-03-10 | Stop reason: HOSPADM

## 2023-03-09 RX ADMIN — SODIUM CHLORIDE, PRESERVATIVE FREE 10 ML: 5 INJECTION INTRAVENOUS at 08:09

## 2023-03-09 NOTE — PROGRESS NOTES
0800:  ARRIVES AMBULATORY FOR INT START. PROCESS REVIEWED.    0815:  #24 GAUGE INT STARTED INTO RFA X ONE ATTEMPT WITH GOOD BLOOD RETURN. INT SECURED IN PLACE.    PT DISCHARGED AMBULATORY WITH INSTRUCTIONS.      _M___ Safety:       (Environmental)  [place] to environment  Ensure ID band is correct and in place/ allergy band as needed  Assess for fall risk  Initiate fall precautions as applicable (fall band, side rails, etc.)  Call light within reach  Bed in low position/ wheels locked    __M__ Pain:       Assess pain level and characteristics  Administer analgesics as ordered  Assess effectiveness of pain management and report to MD as needed    _M___ Knowledge Deficit:  Assess baseline knowledge  Provide teaching at level of understanding  Provide teaching via preferred learning method  Evaluate teaching effectiveness    _M___ Hemodynamic/Respiratory Status:       (Pre and Post Procedure Monitoring)  Assess/Monitor vital signs and LOC  Assess Baseline SpO2 prior to any sedation  Obtain weight/height  Assess vital signs/ LOC until patient meets discharge criteria  Monitor procedure site and notify MD of any issues    _

## 2023-03-09 NOTE — DISCHARGE INSTRUCTIONS
ACTIVITY:  Continue usual care with your doctor. Call your doctor immediately if any severe problems or go to the nearest emergency room. Keep iv site clean and dry  Report to dental office for procedure. I have been treated and hereby acknowledge receiving this instruction sheet.

## 2023-04-03 DIAGNOSIS — G25.81 RLS (RESTLESS LEGS SYNDROME): ICD-10-CM

## 2023-04-03 RX ORDER — PRAMIPEXOLE DIHYDROCHLORIDE 0.5 MG/1
TABLET ORAL
Qty: 270 TABLET | Refills: 3 | Status: SHIPPED | OUTPATIENT
Start: 2023-04-03

## 2023-04-03 NOTE — TELEPHONE ENCOUNTER
Pt called office requesting a refill of her Mirapex 0.5mg 1po am and 2po pm to [facility]. If no call back pt is aware the rx will be sent in today. Refill if appropriate.

## 2023-05-09 DIAGNOSIS — F33.42 RECURRENT MAJOR DEPRESSIVE DISORDER, IN FULL REMISSION (HCC): ICD-10-CM

## 2023-05-09 DIAGNOSIS — G47.9 SLEEP DISORDER: ICD-10-CM

## 2023-05-09 RX ORDER — ROPINIROLE 3 MG/1
TABLET, FILM COATED ORAL
Qty: 90 TABLET | Refills: 3 | Status: SHIPPED | OUTPATIENT
Start: 2023-05-09

## 2023-05-09 NOTE — TELEPHONE ENCOUNTER
Patient called and needs a refill on her ropinirole and sertraline sent to [facility].

## 2023-08-08 ENCOUNTER — TELEPHONE (OUTPATIENT)
Dept: FAMILY MEDICINE CLINIC | Age: 68
End: 2023-08-08

## 2023-08-08 NOTE — TELEPHONE ENCOUNTER
Patient calling with c/o chest pain and tightness x 2 days. \"Very uncomfortable and getting worse. \"  Denies nausea/vomiting and tingling/numbness in arms. Advised ED for further evaluation. She will go to [facility].

## 2023-08-08 NOTE — TELEPHONE ENCOUNTER
Agree Rahul Field  General Surgery  [address] [place] [address], [address]  [place], [place] [id number]  Phone: [phone]  Fax: [phone]  Follow Up Time:

## 2023-08-24 NOTE — TELEPHONE ENCOUNTER
Pt was notified and she already has an appt scheduled for 10/8/18. Trial of flonase - each nostril + zyrtec 10mg / or allegra - take for 7-10 days to see if there is any improvement    If not - will refer to audiology / ENT     Start Sertraline 25mg each day for 1 week, then increase to 50mg / day

## 2023-09-25 ENCOUNTER — COMMUNITY OUTREACH (OUTPATIENT)
Dept: FAMILY MEDICINE CLINIC | Age: 68
End: 2023-09-25

## 2023-11-28 ENCOUNTER — OFFICE VISIT (OUTPATIENT)
Dept: FAMILY MEDICINE CLINIC | Age: 68
End: 2023-11-28
Payer: MEDICARE

## 2023-11-28 VITALS
HEIGHT: 63 IN | HEART RATE: 68 BPM | DIASTOLIC BLOOD PRESSURE: 60 MMHG | SYSTOLIC BLOOD PRESSURE: 118 MMHG | BODY MASS INDEX: 25.98 KG/M2 | RESPIRATION RATE: 16 BRPM | WEIGHT: 146.6 LBS

## 2023-11-28 DIAGNOSIS — G25.81 RLS (RESTLESS LEGS SYNDROME): ICD-10-CM

## 2023-11-28 DIAGNOSIS — E78.00 PURE HYPERCHOLESTEROLEMIA: ICD-10-CM

## 2023-11-28 DIAGNOSIS — Z98.61 S/P CORONARY ANGIOPLASTY: ICD-10-CM

## 2023-11-28 DIAGNOSIS — I25.10 CORONARY ARTERY DISEASE INVOLVING NATIVE CORONARY ARTERY OF NATIVE HEART WITHOUT ANGINA PECTORIS: ICD-10-CM

## 2023-11-28 DIAGNOSIS — J42 CHRONIC BRONCHITIS, UNSPECIFIED CHRONIC BRONCHITIS TYPE (HCC): ICD-10-CM

## 2023-11-28 DIAGNOSIS — I48.0 PAROXYSMAL ATRIAL FIBRILLATION (HCC): Primary | ICD-10-CM

## 2023-11-28 DIAGNOSIS — F33.42 RECURRENT MAJOR DEPRESSIVE DISORDER, IN FULL REMISSION (HCC): ICD-10-CM

## 2023-11-28 DIAGNOSIS — Z23 NEED FOR INFLUENZA VACCINATION: ICD-10-CM

## 2023-11-28 DIAGNOSIS — G47.9 SLEEP DISORDER: ICD-10-CM

## 2023-11-28 DIAGNOSIS — I50.22 CHRONIC SYSTOLIC (CONGESTIVE) HEART FAILURE (HCC): ICD-10-CM

## 2023-11-28 PROCEDURE — 1090F PRES/ABSN URINE INCON ASSESS: CPT | Performed by: NURSE PRACTITIONER

## 2023-11-28 PROCEDURE — 3023F SPIROM DOC REV: CPT | Performed by: NURSE PRACTITIONER

## 2023-11-28 PROCEDURE — G8399 PT W/DXA RESULTS DOCUMENT: HCPCS | Performed by: NURSE PRACTITIONER

## 2023-11-28 PROCEDURE — G8427 DOCREV CUR MEDS BY ELIG CLIN: HCPCS | Performed by: NURSE PRACTITIONER

## 2023-11-28 PROCEDURE — 4004F PT TOBACCO SCREEN RCVD TLK: CPT | Performed by: NURSE PRACTITIONER

## 2023-11-28 PROCEDURE — 99214 OFFICE O/P EST MOD 30 MIN: CPT | Performed by: NURSE PRACTITIONER

## 2023-11-28 PROCEDURE — G0008 ADMIN INFLUENZA VIRUS VAC: HCPCS | Performed by: NURSE PRACTITIONER

## 2023-11-28 PROCEDURE — 3017F COLORECTAL CA SCREEN DOC REV: CPT | Performed by: NURSE PRACTITIONER

## 2023-11-28 PROCEDURE — 3074F SYST BP LT 130 MM HG: CPT | Performed by: NURSE PRACTITIONER

## 2023-11-28 PROCEDURE — 3078F DIAST BP <80 MM HG: CPT | Performed by: NURSE PRACTITIONER

## 2023-11-28 PROCEDURE — G8419 CALC BMI OUT NRM PARAM NOF/U: HCPCS | Performed by: NURSE PRACTITIONER

## 2023-11-28 PROCEDURE — 90694 VACC AIIV4 NO PRSRV 0.5ML IM: CPT | Performed by: NURSE PRACTITIONER

## 2023-11-28 PROCEDURE — G8484 FLU IMMUNIZE NO ADMIN: HCPCS | Performed by: NURSE PRACTITIONER

## 2023-11-28 PROCEDURE — 1123F ACP DISCUSS/DSCN MKR DOCD: CPT | Performed by: NURSE PRACTITIONER

## 2023-11-28 RX ORDER — ATORVASTATIN CALCIUM 80 MG/1
80 TABLET, FILM COATED ORAL DAILY
Qty: 90 TABLET | Refills: 3 | Status: SHIPPED | OUTPATIENT
Start: 2023-11-28

## 2023-11-28 RX ORDER — PRAMIPEXOLE DIHYDROCHLORIDE 0.5 MG/1
TABLET ORAL
Qty: 270 TABLET | Refills: 3 | Status: SHIPPED | OUTPATIENT
Start: 2023-11-28

## 2023-11-28 RX ORDER — RANOLAZINE 500 MG/1
500 TABLET, EXTENDED RELEASE ORAL 2 TIMES DAILY
Qty: 180 TABLET | Refills: 3 | Status: SHIPPED | OUTPATIENT
Start: 2023-11-28

## 2023-11-28 RX ORDER — ISOSORBIDE MONONITRATE 30 MG/1
30 TABLET, EXTENDED RELEASE ORAL DAILY
Qty: 90 TABLET | Refills: 3 | Status: SHIPPED | OUTPATIENT
Start: 2023-11-28

## 2023-11-28 RX ORDER — ROPINIROLE 3 MG/1
TABLET, FILM COATED ORAL
Qty: 90 TABLET | Refills: 3 | Status: SHIPPED | OUTPATIENT
Start: 2023-11-28

## 2023-11-28 RX ORDER — CLOPIDOGREL BISULFATE 75 MG/1
TABLET ORAL
Qty: 90 TABLET | Refills: 3 | Status: SHIPPED | OUTPATIENT
Start: 2023-11-28

## 2023-11-28 RX ORDER — FLUTICASONE FUROATE, UMECLIDINIUM BROMIDE AND VILANTEROL TRIFENATATE 100; 62.5; 25 UG/1; UG/1; UG/1
1 POWDER RESPIRATORY (INHALATION) DAILY
Qty: 3 EACH | Refills: 3 | Status: SHIPPED | OUTPATIENT
Start: 2023-11-28

## 2023-11-28 SDOH — ECONOMIC STABILITY: FOOD INSECURITY: WITHIN THE PAST 12 MONTHS, YOU WORRIED THAT YOUR FOOD WOULD RUN OUT BEFORE YOU GOT MONEY TO BUY MORE.: NEVER TRUE

## 2023-11-28 SDOH — ECONOMIC STABILITY: HOUSING INSECURITY
IN THE LAST 12 MONTHS, WAS THERE A TIME WHEN YOU DID NOT HAVE A STEADY PLACE TO SLEEP OR SLEPT IN A SHELTER (INCLUDING NOW)?: NO

## 2023-11-28 SDOH — ECONOMIC STABILITY: INCOME INSECURITY: HOW HARD IS IT FOR YOU TO PAY FOR THE VERY BASICS LIKE FOOD, HOUSING, MEDICAL CARE, AND HEATING?: NOT HARD AT ALL

## 2023-11-28 SDOH — ECONOMIC STABILITY: FOOD INSECURITY: WITHIN THE PAST 12 MONTHS, THE FOOD YOU BOUGHT JUST DIDN'T LAST AND YOU DIDN'T HAVE MONEY TO GET MORE.: NEVER TRUE

## 2023-11-28 ASSESSMENT — ENCOUNTER SYMPTOMS
SHORTNESS OF BREATH: 0
COUGH: 0
NAUSEA: 0
ABDOMINAL PAIN: 0

## 2023-11-28 ASSESSMENT — PATIENT HEALTH QUESTIONNAIRE - PHQ9
6. FEELING BAD ABOUT YOURSELF - OR THAT YOU ARE A FAILURE OR HAVE LET YOURSELF OR YOUR FAMILY DOWN: 0
3. TROUBLE FALLING OR STAYING ASLEEP: 0
SUM OF ALL RESPONSES TO PHQ9 QUESTIONS 1 & 2: 0
9. THOUGHTS THAT YOU WOULD BE BETTER OFF DEAD, OR OF HURTING YOURSELF: 0
1. LITTLE INTEREST OR PLEASURE IN DOING THINGS: 0
5. POOR APPETITE OR OVEREATING: 0
7. TROUBLE CONCENTRATING ON THINGS, SUCH AS READING THE NEWSPAPER OR WATCHING TELEVISION: 0
SUM OF ALL RESPONSES TO PHQ QUESTIONS 1-9: 0
4. FEELING TIRED OR HAVING LITTLE ENERGY: 0
10. IF YOU CHECKED OFF ANY PROBLEMS, HOW DIFFICULT HAVE THESE PROBLEMS MADE IT FOR YOU TO DO YOUR WORK, TAKE CARE OF THINGS AT HOME, OR GET ALONG WITH OTHER PEOPLE: 0
SUM OF ALL RESPONSES TO PHQ QUESTIONS 1-9: 0
SUM OF ALL RESPONSES TO PHQ QUESTIONS 1-9: 0
2. FEELING DOWN, DEPRESSED OR HOPELESS: 0
8. MOVING OR SPEAKING SO SLOWLY THAT OTHER PEOPLE COULD HAVE NOTICED. OR THE OPPOSITE, BEING SO FIGETY OR RESTLESS THAT YOU HAVE BEEN MOVING AROUND A LOT MORE THAN USUAL: 0
SUM OF ALL RESPONSES TO PHQ QUESTIONS 1-9: 0

## 2023-11-28 ASSESSMENT — COLUMBIA-SUICIDE SEVERITY RATING SCALE - C-SSRS
3. HAVE YOU BEEN THINKING ABOUT HOW YOU MIGHT KILL YOURSELF?: NO
5. HAVE YOU STARTED TO WORK OUT OR WORKED OUT THE DETAILS OF HOW TO KILL YOURSELF? DO YOU INTEND TO CARRY OUT THIS PLAN?: NO
4. HAVE YOU HAD THESE THOUGHTS AND HAD SOME INTENTION OF ACTING ON THEM?: NO
7. DID THIS OCCUR IN THE LAST THREE MONTHS: NO

## 2023-11-28 NOTE — PROGRESS NOTES
Patient was given FLUAD high dose quadrivalent flu vaccine 0.5 ml IM in right deltoid per trevor Don CNP. Patient tolerated well and without incident. VIS given and reviewed.   NDC# 39366-014-50 LOT# [id number] Exp: 5/30/2024    Immunizations Administered       Name Date Dose Route    Influenza, FLUAD, (age 72 y+), Adjuvanted, 0.5mL 11/28/2023 0.5 mL Intramuscular    Site: Deltoid- Right    Lot: [id number]    [address]: 53458-777-68

## 2023-11-28 NOTE — PROGRESS NOTES
Subjective:      Patient ID: Tere Gay [id number] is a 70 y.o. female here for evaluation. Chief Complaint   Patient presents with    Follow-up    Medication Refill       Patient Active Problem List   Diagnosis    Coronary artery disease involving native coronary artery of native heart without angina pectoris    S/P angioplasty with stent-1995 and 1996- in [facility]    Pure hypercholesterolemia    Tobacco abuse    Cardiomyopathy ([address]) EF 40%    Acute CVA (cerebrovascular accident) ([address])    Mild intermittent asthma without complication    Recurrent major depressive disorder, in full remission ([address])    Paroxysmal atrial fibrillation (HCC)    Abnormal cardiovascular stress test    Chest pain    Contusion of knee    COVID-19    History of cardiac pacemaker in situ    Hyperlipidemia    Hypertension    Lightheadedness    Presence of cardiac pacemaker    Restless legs syndrome    Sick sinus syndrome (HCC)    Stented coronary artery    Unstable angina (HCC)    Chronic systolic (congestive) heart failure         COLONOSCOPY - denied  MAMMO - 2022  DEXA - 2022 Osteopenia  CT LUNG - denied    CARDIO - Dr. Mary Peterson Inch   ENT - Dr. Kiki Mcgraw    Denies CP, SOB or chest tightness. AFIB. On Eliquis and Amiodorone. On STATIN. Was admitted to [facility] on 8/10/21 for acute CVA. Slurred speech and right side weakness. Improvement in weakness and speech. Minimal residual effect. Following with [facility]. Still smoking 1/2 ppd.   Denied quitting    BP Readings from Last 3 Encounters:   11/28/23 118/60   03/09/23 (!) 122/56   11/16/22 102/64       Labs reviewed from [address] [facility] in November 2023    Lab Results   Component Value Date    LABA1C 5.7 07/08/2020    LABA1C 5.3 09/30/2016     Lab Results   Component Value Date     07/08/2020       No components found for: \"CHLPL\"  Lab Results   Component Value Date    TRIG 222 (H) 10/21/2022    TRIG 150 02/05/2021    TRIG

## 2024-03-15 DIAGNOSIS — J42 CHRONIC BRONCHITIS, UNSPECIFIED CHRONIC BRONCHITIS TYPE (HCC): ICD-10-CM

## 2024-03-15 DIAGNOSIS — G25.81 RLS (RESTLESS LEGS SYNDROME): ICD-10-CM

## 2024-03-15 DIAGNOSIS — Z98.61 S/P CORONARY ANGIOPLASTY: ICD-10-CM

## 2024-03-15 DIAGNOSIS — G47.9 SLEEP DISORDER: ICD-10-CM

## 2024-03-15 DIAGNOSIS — I48.0 PAROXYSMAL ATRIAL FIBRILLATION (HCC): ICD-10-CM

## 2024-03-15 DIAGNOSIS — E78.00 PURE HYPERCHOLESTEROLEMIA: ICD-10-CM

## 2024-03-15 DIAGNOSIS — F33.42 RECURRENT MAJOR DEPRESSIVE DISORDER, IN FULL REMISSION (HCC): ICD-10-CM

## 2024-03-15 DIAGNOSIS — I50.22 CHRONIC SYSTOLIC (CONGESTIVE) HEART FAILURE (HCC): ICD-10-CM

## 2024-03-15 DIAGNOSIS — I25.10 CORONARY ARTERY DISEASE INVOLVING NATIVE CORONARY ARTERY OF NATIVE HEART WITHOUT ANGINA PECTORIS: ICD-10-CM

## 2024-03-15 RX ORDER — AMIODARONE HYDROCHLORIDE 200 MG/1
200 TABLET ORAL DAILY
Qty: 30 TABLET | Refills: 0 | Status: SHIPPED | OUTPATIENT
Start: 2024-03-15

## 2024-03-15 RX ORDER — RANOLAZINE 500 MG/1
500 TABLET, EXTENDED RELEASE ORAL 2 TIMES DAILY
Qty: 60 TABLET | Refills: 0 | Status: SHIPPED | OUTPATIENT
Start: 2024-03-15

## 2024-03-15 RX ORDER — ATORVASTATIN CALCIUM 80 MG/1
80 TABLET, FILM COATED ORAL DAILY
Qty: 30 TABLET | Refills: 0 | Status: SHIPPED | OUTPATIENT
Start: 2024-03-15

## 2024-03-15 RX ORDER — ISOSORBIDE MONONITRATE 30 MG/1
30 TABLET, EXTENDED RELEASE ORAL DAILY
Qty: 30 TABLET | Refills: 0 | Status: SHIPPED | OUTPATIENT
Start: 2024-03-15

## 2024-03-15 RX ORDER — ROPINIROLE 3 MG/1
TABLET, FILM COATED ORAL
Qty: 30 TABLET | Refills: 0 | Status: SHIPPED | OUTPATIENT
Start: 2024-03-15

## 2024-03-15 RX ORDER — PRAMIPEXOLE DIHYDROCHLORIDE 0.5 MG/1
TABLET ORAL
Qty: 90 TABLET | Refills: 0 | Status: SHIPPED | OUTPATIENT
Start: 2024-03-15

## 2024-03-15 RX ORDER — FLUTICASONE FUROATE, UMECLIDINIUM BROMIDE AND VILANTEROL TRIFENATATE 100; 62.5; 25 UG/1; UG/1; UG/1
1 POWDER RESPIRATORY (INHALATION) DAILY
Qty: 1 EACH | Refills: 0 | Status: SHIPPED | OUTPATIENT
Start: 2024-03-15

## 2024-03-15 RX ORDER — CLOPIDOGREL BISULFATE 75 MG/1
TABLET ORAL
Qty: 30 TABLET | Refills: 0 | Status: SHIPPED | OUTPATIENT
Start: 2024-03-15

## 2024-03-15 RX ORDER — NITROGLYCERIN 0.4 MG/1
0.4 TABLET SUBLINGUAL PRN
Qty: 25 TABLET | Refills: 0 | Status: SHIPPED | OUTPATIENT
Start: 2024-03-15
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
## 2024-04-10 ENCOUNTER — OFFICE VISIT (OUTPATIENT)
Dept: FAMILY MEDICINE CLINIC | Age: 69
End: 2024-04-10

## 2024-04-10 VITALS
RESPIRATION RATE: 16 BRPM | WEIGHT: 136.6 LBS | BODY MASS INDEX: 24.2 KG/M2 | DIASTOLIC BLOOD PRESSURE: 50 MMHG | SYSTOLIC BLOOD PRESSURE: 104 MMHG | HEART RATE: 60 BPM

## 2024-04-10 DIAGNOSIS — E78.00 PURE HYPERCHOLESTEROLEMIA: ICD-10-CM

## 2024-04-10 DIAGNOSIS — Z95.0 HISTORY OF CARDIAC PACEMAKER IN SITU: ICD-10-CM

## 2024-04-10 DIAGNOSIS — J42 CHRONIC BRONCHITIS, UNSPECIFIED CHRONIC BRONCHITIS TYPE (HCC): ICD-10-CM

## 2024-04-10 DIAGNOSIS — G25.81 RLS (RESTLESS LEGS SYNDROME): ICD-10-CM

## 2024-04-10 DIAGNOSIS — I48.0 PAROXYSMAL ATRIAL FIBRILLATION (HCC): ICD-10-CM

## 2024-04-10 DIAGNOSIS — I25.10 CORONARY ARTERY DISEASE INVOLVING NATIVE CORONARY ARTERY OF NATIVE HEART WITHOUT ANGINA PECTORIS: Primary | ICD-10-CM

## 2024-04-10 DIAGNOSIS — G47.9 SLEEP DISORDER: ICD-10-CM

## 2024-04-10 DIAGNOSIS — Z98.61 S/P CORONARY ANGIOPLASTY: ICD-10-CM

## 2024-04-10 DIAGNOSIS — I50.22 CHRONIC SYSTOLIC (CONGESTIVE) HEART FAILURE (HCC): ICD-10-CM

## 2024-04-10 DIAGNOSIS — F33.42 RECURRENT MAJOR DEPRESSIVE DISORDER, IN FULL REMISSION (HCC): ICD-10-CM

## 2024-04-10 RX ORDER — METHYLPREDNISOLONE 4 MG/1
2 TABLET ORAL DAILY
COMMUNITY

## 2024-04-10 RX ORDER — DEXTROMETHORPHAN HBR. AND GUAIFENESIN 10; 100 MG/5ML; MG/5ML
SOLUTION ORAL
COMMUNITY
Start: 2024-03-28

## 2024-04-10 RX ORDER — NICOTINE 21 MG/24HR
PATCH, TRANSDERMAL 24 HOURS TRANSDERMAL
COMMUNITY
Start: 2024-03-28

## 2024-04-10 RX ORDER — ASPIRIN 81 MG/1
81 TABLET ORAL DAILY
COMMUNITY

## 2024-04-10 RX ORDER — LOSARTAN POTASSIUM 25 MG/1
TABLET ORAL
COMMUNITY
Start: 2024-03-28

## 2024-04-10 RX ORDER — PRAMIPEXOLE DIHYDROCHLORIDE 0.5 MG/1
TABLET ORAL
Qty: 90 TABLET | Refills: 0 | COMMUNITY
Start: 2024-04-10

## 2024-04-10 RX ORDER — ROPINIROLE 3 MG/1
TABLET, FILM COATED ORAL
Qty: 30 TABLET | Refills: 0 | COMMUNITY
Start: 2024-04-10

## 2024-04-10 RX ORDER — RANOLAZINE 500 MG/1
500 TABLET, EXTENDED RELEASE ORAL 2 TIMES DAILY
Qty: 60 TABLET | Refills: 0 | COMMUNITY
Start: 2024-04-10

## 2024-04-10 RX ORDER — SPIRONOLACTONE 25 MG/1
TABLET ORAL
COMMUNITY
Start: 2024-03-28

## 2024-04-10 RX ORDER — ALBUTEROL SULFATE 90 UG/1
2 AEROSOL, METERED RESPIRATORY (INHALATION) EVERY 6 HOURS PRN
COMMUNITY

## 2024-04-10 ASSESSMENT — ENCOUNTER SYMPTOMS
CHEST TIGHTNESS: 0
SHORTNESS OF BREATH: 0
COUGH: 0
NAUSEA: 0
ABDOMINAL PAIN: 0

## 2024-04-10 ASSESSMENT — PATIENT HEALTH QUESTIONNAIRE - PHQ9
SUM OF ALL RESPONSES TO PHQ QUESTIONS 1-9: 3
9. THOUGHTS THAT YOU WOULD BE BETTER OFF DEAD, OR OF HURTING YOURSELF: NOT AT ALL
SUM OF ALL RESPONSES TO PHQ QUESTIONS 1-9: 3
3. TROUBLE FALLING OR STAYING ASLEEP: NOT AT ALL
5. POOR APPETITE OR OVEREATING: NOT AT ALL
SUM OF ALL RESPONSES TO PHQ QUESTIONS 1-9: 3
1. LITTLE INTEREST OR PLEASURE IN DOING THINGS: NOT AT ALL
SUM OF ALL RESPONSES TO PHQ9 QUESTIONS 1 & 2: 0
2. FEELING DOWN, DEPRESSED OR HOPELESS: NOT AT ALL
4. FEELING TIRED OR HAVING LITTLE ENERGY: NEARLY EVERY DAY
SUM OF ALL RESPONSES TO PHQ QUESTIONS 1-9: 3
6. FEELING BAD ABOUT YOURSELF - OR THAT YOU ARE A FAILURE OR HAVE LET YOURSELF OR YOUR FAMILY DOWN: NOT AT ALL
8. MOVING OR SPEAKING SO SLOWLY THAT OTHER PEOPLE COULD HAVE NOTICED. OR THE OPPOSITE, BEING SO FIGETY OR RESTLESS THAT YOU HAVE BEEN MOVING AROUND A LOT MORE THAN USUAL: NOT AT ALL
7. TROUBLE CONCENTRATING ON THINGS, SUCH AS READING THE NEWSPAPER OR WATCHING TELEVISION: NOT AT ALL
10. IF YOU CHECKED OFF ANY PROBLEMS, HOW DIFFICULT HAVE THESE PROBLEMS MADE IT FOR YOU TO DO YOUR WORK, TAKE CARE OF THINGS AT HOME, OR GET ALONG WITH OTHER PEOPLE: NOT DIFFICULT AT ALL

## 2024-04-10 NOTE — PROGRESS NOTES
Subjective:      Patient ID: Sarah Oro 1955 is a 68 y.o. female here for evaluation.     Chief Complaint   Patient presents with    Follow-Up from [facility] 3/25/24       3/25/24 through 4/1/24.  s/p [facility] 03/27 resulting in PCI of the LAD. Developed SOB following Brilinta administration. Change to DAPT with aspirin/plavix this am.     Seen Dr. Peralta OP CARDIO Follow up on 4/5/24.  She will be going through [facility].  No HH.     Assessment   (1) Elevated troponin:   Status: Acute   (2) Acute respiratory failure with hypoxia and hypercapnia:   Status: Acute   (3) Acute exacerbation of chronic obstructive pulmonary disease:   Status: Acute   (4) Coronary artery disease:   Status: Chronic   Qualifiers:   Coronary Disease-Associated Artery/Lesion type: native artery Native vs.   transplanted heart: native heart Associated angina: without angina Qualified Code(s):   I25.10 - Atherosclerotic heart disease of native coronary artery without angina pectoris   (5) Atrial fibrillation:   Status: Chronic   Qualifiers:   Atrial fibrillation type: paroxysmal Qualified Code(s): I48.0 - Paroxysmal atrial   fibrillation   (6) Hypertension:   Status: Chronic   Qualifiers:   Hypertension type: primary hypertension Qualified Code(s): I10 - Essential   (primary) hypertension   (7) History of CVA (cerebrovascular accident) without residual deficits:   Status: Chronic   (8) Tobacco use:   Status: Chronic   (9) Cardiomyopathy:   Status: Chronic   Qualifiers:   Cardiomyopathy type: unspecified Qualified Code(s): I42.9 - Cardiomyopathy,   unspecified   (10) History of coronary artery stent placement:   Status: Chronic   (11) Pacemaker:   Status: Chronic     Plan   Plan per Dr. Palmer,     s/p [facility] 03/27 resulting in PCI of the LAD. Developed SOB following Brilinta   administration. Change to DAPT with aspirin/plavix this am.   Post-PCI EKG 03/27 1557pm reviewed and stable from previous.     Had episode of AFIB

## 2024-05-01 ENCOUNTER — OFFICE VISIT (OUTPATIENT)
Dept: FAMILY MEDICINE CLINIC | Age: 69
End: 2024-05-01

## 2024-05-01 VITALS
HEIGHT: 63 IN | DIASTOLIC BLOOD PRESSURE: 70 MMHG | SYSTOLIC BLOOD PRESSURE: 116 MMHG | WEIGHT: 138.9 LBS | RESPIRATION RATE: 16 BRPM | HEART RATE: 60 BPM | BODY MASS INDEX: 24.61 KG/M2

## 2024-05-01 DIAGNOSIS — A46 ERYSIPELAS: Primary | ICD-10-CM
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
## 2024-05-01 PROBLEM — S63.501A SPRAIN OF RIGHT WRIST: Status: ACTIVE | Noted: 2024-05-01

## 2024-05-01 PROBLEM — Z86.73 HISTORY OF STROKE WITHOUT RESIDUAL DEFICITS: Status: ACTIVE | Noted: 2024-05-01

## 2024-05-01 PROBLEM — S09.90XA INJURY OF HEAD: Status: ACTIVE | Noted: 2024-05-01

## 2024-05-01 RX ORDER — CEFADROXIL 500 MG/1
500 CAPSULE ORAL 2 TIMES DAILY
Qty: 14 CAPSULE | Refills: 0 | Status: SHIPPED | OUTPATIENT
Start: 2024-05-01 | End: 2024-05-08

## 2024-05-01 ASSESSMENT — ENCOUNTER SYMPTOMS
COUGH: 0
COLOR CHANGE: 1
SHORTNESS OF BREATH: 0
NAUSEA: 0
ABDOMINAL PAIN: 0
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
## 2024-06-03 ENCOUNTER — TELEPHONE (OUTPATIENT)
Dept: FAMILY MEDICINE CLINIC | Age: 69
End: 2024-06-03

## 2024-06-03 DIAGNOSIS — G25.81 RLS (RESTLESS LEGS SYNDROME): Primary | ICD-10-CM

## 2024-06-03 RX ORDER — GABAPENTIN 100 MG/1
100 CAPSULE ORAL 3 TIMES DAILY
Qty: 90 CAPSULE | Refills: 0 | Status: SHIPPED | OUTPATIENT
Start: 2024-06-03 | End: 2024-11-30

## 2024-06-03 NOTE — TELEPHONE ENCOUNTER
Patient called and stated that she is having trouble with restless leg. She stated that she is on Pramipexole however she is still having trouble with the restless leg at night. She stated that it drives her crazy.     She stated that she is taking the pramipexole in the morning and then 2 at night.   She didn't know if there was something else.     She will check with her pharmacy around 4:00 pm. She is using [facility]

## 2024-10-01 ENCOUNTER — COMMUNITY OUTREACH (OUTPATIENT)
Dept: FAMILY MEDICINE CLINIC | Age: 69
End: 2024-10-01

## 2024-12-05 DIAGNOSIS — F33.42 RECURRENT MAJOR DEPRESSIVE DISORDER, IN FULL REMISSION (HCC): ICD-10-CM

## 2024-12-05 DIAGNOSIS — G25.81 RLS (RESTLESS LEGS SYNDROME): ICD-10-CM

## 2024-12-05 RX ORDER — PRAMIPEXOLE DIHYDROCHLORIDE 0.5 MG/1
TABLET ORAL
Qty: 270 TABLET | Refills: 3 | Status: SHIPPED | OUTPATIENT
Start: 2024-12-05

## 2025-01-28 DIAGNOSIS — G47.9 SLEEP DISORDER: ICD-10-CM

## 2025-01-28 RX ORDER — ROPINIROLE 3 MG/1
TABLET, FILM COATED ORAL
Qty: 90 TABLET | Refills: 3 | Status: SHIPPED | OUTPATIENT
Start: 2025-01-28

## 2025-01-28 NOTE — TELEPHONE ENCOUNTER
Patient called and stated that she needs a refill on ropinirole 3 mg.   She would like a refill sent to [facility].     She will check with pharmacy after 4:00 today

## 2025-03-06 ENCOUNTER — OFFICE VISIT (OUTPATIENT)
Dept: FAMILY MEDICINE CLINIC | Age: 70
End: 2025-03-06
Payer: MEDICARE

## 2025-03-06 VITALS
RESPIRATION RATE: 16 BRPM | SYSTOLIC BLOOD PRESSURE: 98 MMHG | HEART RATE: 72 BPM | WEIGHT: 133.1 LBS | HEIGHT: 63 IN | DIASTOLIC BLOOD PRESSURE: 56 MMHG | BODY MASS INDEX: 23.58 KG/M2

## 2025-03-06 DIAGNOSIS — I50.22 CHRONIC SYSTOLIC (CONGESTIVE) HEART FAILURE (HCC): ICD-10-CM

## 2025-03-06 DIAGNOSIS — N12 PYELONEPHRITIS: Primary | ICD-10-CM

## 2025-03-06 DIAGNOSIS — R30.0 DYSURIA: ICD-10-CM

## 2025-03-06 DIAGNOSIS — I48.0 PAROXYSMAL ATRIAL FIBRILLATION (HCC): ICD-10-CM

## 2025-03-06 DIAGNOSIS — J42 CHRONIC BRONCHITIS, UNSPECIFIED CHRONIC BRONCHITIS TYPE (HCC): ICD-10-CM

## 2025-03-06 LAB
BILIRUBIN, POC: NORMAL
BLOOD URINE, POC: NORMAL
CLARITY, POC: NORMAL
COLOR, POC: NORMAL
GLUCOSE URINE, POC: NORMAL MG/DL
KETONES, POC: NORMAL MG/DL
LEUKOCYTE EST, POC: NORMAL
NITRITE, POC: POSITIVE
PH, POC: 5.5
PROTEIN, POC: 30 MG/DL
SPECIFIC GRAVITY, POC: 1
UROBILINOGEN, POC: 1 MG/DL

## 2025-03-06 PROCEDURE — 1123F ACP DISCUSS/DSCN MKR DOCD: CPT | Performed by: NURSE PRACTITIONER

## 2025-03-06 PROCEDURE — 3078F DIAST BP <80 MM HG: CPT | Performed by: NURSE PRACTITIONER

## 2025-03-06 PROCEDURE — 81003 URINALYSIS AUTO W/O SCOPE: CPT | Performed by: NURSE PRACTITIONER

## 2025-03-06 PROCEDURE — 3023F SPIROM DOC REV: CPT | Performed by: NURSE PRACTITIONER

## 2025-03-06 PROCEDURE — G8427 DOCREV CUR MEDS BY ELIG CLIN: HCPCS | Performed by: NURSE PRACTITIONER

## 2025-03-06 PROCEDURE — G2211 COMPLEX E/M VISIT ADD ON: HCPCS | Performed by: NURSE PRACTITIONER

## 2025-03-06 PROCEDURE — 1036F TOBACCO NON-USER: CPT | Performed by: NURSE PRACTITIONER

## 2025-03-06 PROCEDURE — 1159F MED LIST DOCD IN RCRD: CPT | Performed by: NURSE PRACTITIONER

## 2025-03-06 PROCEDURE — 99214 OFFICE O/P EST MOD 30 MIN: CPT | Performed by: NURSE PRACTITIONER

## 2025-03-06 PROCEDURE — G8399 PT W/DXA RESULTS DOCUMENT: HCPCS | Performed by: NURSE PRACTITIONER

## 2025-03-06 PROCEDURE — 3017F COLORECTAL CA SCREEN DOC REV: CPT | Performed by: NURSE PRACTITIONER

## 2025-03-06 PROCEDURE — G8420 CALC BMI NORM PARAMETERS: HCPCS | Performed by: NURSE PRACTITIONER

## 2025-03-06 PROCEDURE — 1090F PRES/ABSN URINE INCON ASSESS: CPT | Performed by: NURSE PRACTITIONER

## 2025-03-06 PROCEDURE — 3074F SYST BP LT 130 MM HG: CPT | Performed by: NURSE PRACTITIONER

## 2025-03-06 RX ORDER — ONDANSETRON 4 MG/1
4 TABLET, ORALLY DISINTEGRATING ORAL 3 TIMES DAILY PRN
Qty: 21 TABLET | Refills: 0 | Status: SHIPPED | OUTPATIENT
Start: 2025-03-06

## 2025-03-06 RX ORDER — CIPROFLOXACIN 500 MG/1
500 TABLET, FILM COATED ORAL 2 TIMES DAILY
Qty: 14 TABLET | Refills: 0 | Status: SHIPPED | OUTPATIENT
Start: 2025-03-06 | End: 2025-03-13

## 2025-03-06 SDOH — ECONOMIC STABILITY: FOOD INSECURITY: WITHIN THE PAST 12 MONTHS, YOU WORRIED THAT YOUR FOOD WOULD RUN OUT BEFORE YOU GOT MONEY TO BUY MORE.: NEVER TRUE

## 2025-03-06 SDOH — ECONOMIC STABILITY: FOOD INSECURITY: WITHIN THE PAST 12 MONTHS, THE FOOD YOU BOUGHT JUST DIDN'T LAST AND YOU DIDN'T HAVE MONEY TO GET MORE.: NEVER TRUE

## 2025-03-06 ASSESSMENT — PATIENT HEALTH QUESTIONNAIRE - PHQ9
9. THOUGHTS THAT YOU WOULD BE BETTER OFF DEAD, OR OF HURTING YOURSELF: NOT AT ALL
5. POOR APPETITE OR OVEREATING: NEARLY EVERY DAY
SUM OF ALL RESPONSES TO PHQ QUESTIONS 1-9: 9
7. TROUBLE CONCENTRATING ON THINGS, SUCH AS READING THE NEWSPAPER OR WATCHING TELEVISION: NOT AT ALL
2. FEELING DOWN, DEPRESSED OR HOPELESS: NOT AT ALL
1. LITTLE INTEREST OR PLEASURE IN DOING THINGS: NOT AT ALL
6. FEELING BAD ABOUT YOURSELF - OR THAT YOU ARE A FAILURE OR HAVE LET YOURSELF OR YOUR FAMILY DOWN: NOT AT ALL
SUM OF ALL RESPONSES TO PHQ QUESTIONS 1-9: 9
3. TROUBLE FALLING OR STAYING ASLEEP: NEARLY EVERY DAY
SUM OF ALL RESPONSES TO PHQ QUESTIONS 1-9: 9
10. IF YOU CHECKED OFF ANY PROBLEMS, HOW DIFFICULT HAVE THESE PROBLEMS MADE IT FOR YOU TO DO YOUR WORK, TAKE CARE OF THINGS AT HOME, OR GET ALONG WITH OTHER PEOPLE: VERY DIFFICULT
4. FEELING TIRED OR HAVING LITTLE ENERGY: NEARLY EVERY DAY
8. MOVING OR SPEAKING SO SLOWLY THAT OTHER PEOPLE COULD HAVE NOTICED. OR THE OPPOSITE, BEING SO FIGETY OR RESTLESS THAT YOU HAVE BEEN MOVING AROUND A LOT MORE THAN USUAL: NOT AT ALL
SUM OF ALL RESPONSES TO PHQ QUESTIONS 1-9: 9

## 2025-03-06 ASSESSMENT — ENCOUNTER SYMPTOMS
SHORTNESS OF BREATH: 0
ABDOMINAL PAIN: 0
CHEST TIGHTNESS: 0
NAUSEA: 1
COUGH: 0
BACK PAIN: 1

## 2025-03-06 NOTE — PROGRESS NOTES
MORNING AND TAKE TWO TABLETS IN THE  EVENING 270 tablet 3    sertraline (ZOLOFT) 50 MG tablet TAKE ONE TABLET BY MOUTH EVERY DAY 90 tablet 3    albuterol sulfate HFA (VENTOLIN HFA) 108 (90 Base) MCG/ACT inhaler Inhale 2 puffs into the lungs every 6 hours as needed for Wheezing      aspirin 81 MG EC tablet Take 1 tablet by mouth daily      losartan (COZAAR) 25 MG tablet 12.5 MG (1/2 X 25 MG) ORALLY DAILY      spironolactone (ALDACTONE) 25 MG tablet Take by mouth      umeclidinium-vilanterol (ANORO ELLIPTA) 62.5-25 MCG/ACT inhaler Inhale into the lungs      ranolazine (RANEXA) 500 MG extended release tablet Take 1 tablet by mouth 2 times daily 60 tablet 0    apixaban (ELIQUIS) 5 MG TABS tablet TAKE 1 TABLET BY MOUTH TWICE A DAY 60 tablet 0    atorvastatin (LIPITOR) 80 MG tablet Take 1 tablet by mouth daily 30 tablet 0    clopidogrel (PLAVIX) 75 MG tablet TAKE 1 TABLET BY MOUTH EVERY DAY 30 tablet 0    metoprolol tartrate (LOPRESSOR) 25 MG tablet TAKE 1 TABLET BY MOUTH TWICE A DAY 60 tablet 0    gabapentin (NEURONTIN) 100 MG capsule Take 1 capsule by mouth 3 times daily for 180 days. Intended supply: 30 days 90 capsule 0     No current facility-administered medications for this visit.

## 2025-03-08 LAB
BACTERIA UR CULT: ABNORMAL
ORGANISM: ABNORMAL

## 2025-03-09 ENCOUNTER — RESULTS FOLLOW-UP (OUTPATIENT)
Dept: FAMILY MEDICINE CLINIC | Age: 70
End: 2025-03-09

## 2025-04-08 ENCOUNTER — TELEPHONE (OUTPATIENT)
Dept: FAMILY MEDICINE CLINIC | Age: 70
End: 2025-04-08

## 2025-04-08 NOTE — TELEPHONE ENCOUNTER
Pt called office back stating when she spoke with the pharmacy she was told they will not fill Pramipexole and Ropinirole at the same time. Pt says she does not know why because she has taken both of these for years.

## 2025-04-08 NOTE — TELEPHONE ENCOUNTER
Check with Pharmacy regarding - has been on long term with no issues.  They are in same class so we can also increase one of the medications.

## 2025-04-08 NOTE — TELEPHONE ENCOUNTER
Pt called office requesting a refill of the Pramipexole, says she has been out for a couple weeks now. Advised this was sent to [facility]  on 12/5/2024 #270 Ref x3. Pt will contact the pharmacy regarding.

## 2025-04-08 NOTE — TELEPHONE ENCOUNTER
I phoned [facility], spoke with Atif who says there is no issues filling both of these medications. Pt has to call every 3months to get her meds filled. She just called today about the Pramipexole so that should reach pt home in 7-10d. The Ropinirole was last filled 2/12 and pt would be due for another refill the 3rd week of April.     Per Atif, no issues filling these and nothing further needed from us.    Pt was notified and voiced understanding.

## 2025-04-09 DIAGNOSIS — G25.81 RLS (RESTLESS LEGS SYNDROME): ICD-10-CM

## 2025-04-09 RX ORDER — PRAMIPEXOLE DIHYDROCHLORIDE 0.5 MG/1
TABLET ORAL
Qty: 30 TABLET | Refills: 0 | Status: SHIPPED | OUTPATIENT
Start: 2025-04-09

## 2025-04-09 NOTE — TELEPHONE ENCOUNTER
Pt called office stating she is waiting to receive her Pramipexole in the mail. Pt would like 30 tablets to be sent to [facility] to get her through. Pt takes it TID so this would be a 10d supply. If no call back she will check with the pharmacy after noon.

## 2025-04-23 ENCOUNTER — COMMUNITY OUTREACH (OUTPATIENT)
Dept: FAMILY MEDICINE CLINIC | Age: 70
End: 2025-04-23

## 2025-06-30 ENCOUNTER — TELEPHONE (OUTPATIENT)
Dept: FAMILY MEDICINE CLINIC | Age: 70
End: 2025-06-30

## 2025-06-30 DIAGNOSIS — G25.81 RLS (RESTLESS LEGS SYNDROME): ICD-10-CM

## 2025-06-30 RX ORDER — PRAMIPEXOLE DIHYDROCHLORIDE 0.5 MG/1
TABLET ORAL
Qty: 270 TABLET | Refills: 3 | Status: SHIPPED | OUTPATIENT
Start: 2025-06-30

## 2025-06-30 NOTE — TELEPHONE ENCOUNTER
Pt called office requesting a refill of the Pramipexole to . If no call back pt aware this will be sent in today.